# Patient Record
Sex: MALE | Race: BLACK OR AFRICAN AMERICAN | NOT HISPANIC OR LATINO | Employment: OTHER | URBAN - METROPOLITAN AREA
[De-identification: names, ages, dates, MRNs, and addresses within clinical notes are randomized per-mention and may not be internally consistent; named-entity substitution may affect disease eponyms.]

---

## 2019-04-22 ENCOUNTER — OFFICE VISIT (OUTPATIENT)
Dept: INTERNAL MEDICINE | Facility: CLINIC | Age: 44
End: 2019-04-22
Payer: OTHER GOVERNMENT

## 2019-04-22 VITALS
OXYGEN SATURATION: 98 % | BODY MASS INDEX: 34.64 KG/M2 | TEMPERATURE: 97 F | DIASTOLIC BLOOD PRESSURE: 94 MMHG | SYSTOLIC BLOOD PRESSURE: 138 MMHG | HEIGHT: 67 IN | WEIGHT: 220.69 LBS | HEART RATE: 73 BPM

## 2019-04-22 DIAGNOSIS — I10 ESSENTIAL HYPERTENSION: ICD-10-CM

## 2019-04-22 DIAGNOSIS — F43.10 PTSD (POST-TRAUMATIC STRESS DISORDER): ICD-10-CM

## 2019-04-22 DIAGNOSIS — N52.9 ERECTILE DYSFUNCTION, UNSPECIFIED ERECTILE DYSFUNCTION TYPE: ICD-10-CM

## 2019-04-22 DIAGNOSIS — B00.9 HSV-2 INFECTION: ICD-10-CM

## 2019-04-22 DIAGNOSIS — N40.0 BENIGN PROSTATIC HYPERPLASIA WITHOUT LOWER URINARY TRACT SYMPTOMS: ICD-10-CM

## 2019-04-22 DIAGNOSIS — G43.109 MIGRAINE WITH AURA AND WITHOUT STATUS MIGRAINOSUS, NOT INTRACTABLE: ICD-10-CM

## 2019-04-22 DIAGNOSIS — M10.9 GOUT, UNSPECIFIED CAUSE, UNSPECIFIED CHRONICITY, UNSPECIFIED SITE: ICD-10-CM

## 2019-04-22 DIAGNOSIS — Z00.00 ROUTINE GENERAL MEDICAL EXAMINATION AT A HEALTH CARE FACILITY: Primary | ICD-10-CM

## 2019-04-22 DIAGNOSIS — N18.9 CHRONIC KIDNEY DISEASE, UNSPECIFIED CKD STAGE: ICD-10-CM

## 2019-04-22 DIAGNOSIS — M17.0 ARTHRITIS OF BOTH KNEES: ICD-10-CM

## 2019-04-22 DIAGNOSIS — K58.0 IRRITABLE BOWEL SYNDROME WITH DIARRHEA: ICD-10-CM

## 2019-04-22 PROCEDURE — 99204 OFFICE O/P NEW MOD 45 MIN: CPT | Mod: PBBFAC,PN | Performed by: INTERNAL MEDICINE

## 2019-04-22 PROCEDURE — 99999 PR PBB SHADOW E&M-NEW PATIENT-LVL IV: ICD-10-PCS | Mod: PBBFAC,,, | Performed by: INTERNAL MEDICINE

## 2019-04-22 PROCEDURE — 99386 PR PREVENTIVE VISIT,NEW,40-64: ICD-10-PCS | Mod: S$PBB,,, | Performed by: INTERNAL MEDICINE

## 2019-04-22 PROCEDURE — 99999 PR PBB SHADOW E&M-NEW PATIENT-LVL IV: CPT | Mod: PBBFAC,,, | Performed by: INTERNAL MEDICINE

## 2019-04-22 PROCEDURE — 99386 PREV VISIT NEW AGE 40-64: CPT | Mod: S$PBB,,, | Performed by: INTERNAL MEDICINE

## 2019-04-22 RX ORDER — PRAZOSIN HYDROCHLORIDE 2 MG/1
1 CAPSULE ORAL NIGHTLY
COMMUNITY
End: 2019-04-22 | Stop reason: SDUPTHER

## 2019-04-22 RX ORDER — SILDENAFIL 100 MG/1
100 TABLET, FILM COATED ORAL DAILY PRN
COMMUNITY
End: 2019-04-22 | Stop reason: SDUPTHER

## 2019-04-22 RX ORDER — SILDENAFIL 100 MG/1
100 TABLET, FILM COATED ORAL DAILY PRN
Qty: 12 TABLET | Refills: 0 | Status: SHIPPED | OUTPATIENT
Start: 2019-04-22 | End: 2020-02-27 | Stop reason: SDUPTHER

## 2019-04-22 RX ORDER — PRAZOSIN HYDROCHLORIDE 2 MG/1
2 CAPSULE ORAL NIGHTLY
Qty: 90 CAPSULE | Refills: 0 | Status: SHIPPED | OUTPATIENT
Start: 2019-04-22 | End: 2019-05-28 | Stop reason: SDUPTHER

## 2019-04-22 RX ORDER — DICYCLOMINE HYDROCHLORIDE 10 MG/1
10 CAPSULE ORAL EVERY 6 HOURS PRN
Qty: 90 CAPSULE | Refills: 0 | Status: SHIPPED | OUTPATIENT
Start: 2019-04-22 | End: 2019-05-22

## 2019-04-22 RX ORDER — AMLODIPINE BESYLATE 5 MG/1
5 TABLET ORAL DAILY
COMMUNITY
End: 2019-04-22 | Stop reason: SDUPTHER

## 2019-04-22 RX ORDER — LOSARTAN POTASSIUM 25 MG/1
25 TABLET ORAL DAILY
Qty: 90 TABLET | Refills: 0 | Status: SHIPPED | OUTPATIENT
Start: 2019-04-22 | End: 2019-05-28 | Stop reason: SDUPTHER

## 2019-04-22 RX ORDER — VALACYCLOVIR HYDROCHLORIDE 500 MG/1
500 TABLET, FILM COATED ORAL 2 TIMES DAILY PRN
COMMUNITY
End: 2019-04-22 | Stop reason: SDUPTHER

## 2019-04-22 RX ORDER — VALACYCLOVIR HYDROCHLORIDE 500 MG/1
500 TABLET, FILM COATED ORAL 2 TIMES DAILY PRN
Qty: 30 TABLET | Refills: 0 | Status: SHIPPED | OUTPATIENT
Start: 2019-04-22 | End: 2019-05-28 | Stop reason: SDUPTHER

## 2019-04-22 RX ORDER — TRAZODONE HYDROCHLORIDE 50 MG/1
100 TABLET ORAL NIGHTLY PRN
COMMUNITY
End: 2019-04-22

## 2019-04-22 RX ORDER — LOSARTAN POTASSIUM 25 MG/1
25 TABLET ORAL DAILY
COMMUNITY
End: 2019-04-22 | Stop reason: SDUPTHER

## 2019-04-22 RX ORDER — RIZATRIPTAN BENZOATE 10 MG/1
10 TABLET ORAL
COMMUNITY
End: 2019-07-30

## 2019-04-22 RX ORDER — AMLODIPINE BESYLATE 5 MG/1
5 TABLET ORAL DAILY
Qty: 90 TABLET | Refills: 0 | Status: SHIPPED | OUTPATIENT
Start: 2019-04-22 | End: 2019-05-28 | Stop reason: SDUPTHER

## 2019-04-22 RX ORDER — PREDNISONE 10 MG/1
20 TABLET ORAL DAILY
COMMUNITY
End: 2019-11-18

## 2019-04-22 RX ORDER — SERTRALINE HYDROCHLORIDE 100 MG/1
100 TABLET, FILM COATED ORAL DAILY
COMMUNITY
End: 2019-05-28 | Stop reason: SDUPTHER

## 2019-04-22 RX ORDER — TRAZODONE HYDROCHLORIDE 150 MG/1
150 TABLET ORAL NIGHTLY
Qty: 90 TABLET | Refills: 0 | Status: SHIPPED | OUTPATIENT
Start: 2019-04-22 | End: 2019-05-28 | Stop reason: SDUPTHER

## 2019-04-22 RX ORDER — ALLOPURINOL 300 MG/1
300 TABLET ORAL DAILY
COMMUNITY
End: 2019-05-28 | Stop reason: SDUPTHER

## 2019-04-22 NOTE — PROGRESS NOTES
Subjective:      Patient ID: Elder Hernandez is a 44 y.o. male.    Chief Complaint: Establish Care    HPI   45 yo with   Patient Active Problem List   Diagnosis    PTSD (post-traumatic stress disorder)    Irritable bowel syndrome with diarrhea    Chronic kidney disease    Migraine with aura and without status migrainosus, not intractable    Erectile dysfunction    HSV-2 infection    HTN (hypertension)    Benign prostatic hyperplasia without lower urinary tract symptoms    Arthritis of both knees    Gout     Past Medical History:   Diagnosis Date    Acid reflux     Gout     Hypertension     Kidney problem     PTSD (post-traumatic stress disorder)     Sleep apnea      Here today for annual prev exam.  Compliant with meds without significant side effects. Energy and appetite are good.   Also here for management of mult med problems as outlined below. Dx with kidney disease in 2015.   No f/h of colon or prostate cancer.      Social History     Socioeconomic History    Marital status:      Spouse name: Not on file    Number of children: 4    Years of education: Not on file    Highest education level: Not on file   Occupational History    Occupation: [employer]   Social Needs    Financial resource strain: Not on file    Food insecurity:     Worry: Not on file     Inability: Not on file    Transportation needs:     Medical: Not on file     Non-medical: Not on file   Tobacco Use    Smoking status: Never Smoker   Substance and Sexual Activity    Alcohol use: Yes     Comment: socially    Drug use: Never    Sexual activity: Yes     Partners: Female     Birth control/protection: None   Lifestyle    Physical activity:     Days per week: Not on file     Minutes per session: Not on file    Stress: Rather much   Relationships    Social connections:     Talks on phone: Not on file     Gets together: Not on file     Attends Mu-ism service: Not on file     Active member of club or organization: Not  "on file     Attends meetings of clubs or organizations: Not on file     Relationship status: Not on file   Other Topics Concern    Not on file   Social History Narrative    Not on file          Review of Systems   Constitutional: Negative for chills and fever.   HENT: Negative for ear pain and sore throat.    Respiratory: Negative for cough, shortness of breath and wheezing.    Cardiovascular: Negative for chest pain, palpitations and leg swelling.   Gastrointestinal: Negative for abdominal pain and blood in stool.   Genitourinary: Negative for dysuria and hematuria.   Skin: Negative for rash and wound.   Neurological: Negative for seizures and syncope.   Psychiatric/Behavioral: Positive for sleep disturbance. Negative for dysphoric mood, hallucinations and suicidal ideas. The patient is not nervous/anxious.      Objective:   BP (!) 138/94   Pulse 73   Temp 96.8 °F (36 °C) (Tympanic)   Ht 5' 7" (1.702 m)   Wt 100.1 kg (220 lb 10.9 oz)   SpO2 98%   BMI 34.56 kg/m²     Physical Exam    Assessment:     1. Routine general medical examination at a health care facility    2. Chronic kidney disease, unspecified CKD stage    3. PTSD (post-traumatic stress disorder)    4. Irritable bowel syndrome with diarrhea    5. Migraine with aura and without status migrainosus, not intractable    6. Erectile dysfunction, unspecified erectile dysfunction type    7. HSV-2 infection    8. Essential hypertension    9. Gout, unspecified cause, unspecified chronicity, unspecified site    10. Benign prostatic hyperplasia without lower urinary tract symptoms    11. Arthritis of both knees      Plan:   Routine general medical examination at a health care facility  Heart healthy diet and reg exercise   reviewed  Reports up to date with vaccines via   -     Comprehensive metabolic panel; Future; Expected date: 04/22/2019  -     CBC auto differential; Future; Expected date: 04/22/2019  -     TSH; Future; Expected date: " 04/22/2019  -     Lipid panel; Future; Expected date: 04/22/2019  -     Hemoglobin A1c; Future; Expected date: 04/22/2019  -     PSA, Screening; Future; Expected date: 04/22/2019    Chronic kidney disease, unspecified CKD stage  -     Ambulatory Referral to Nephrology    PTSD (post-traumatic stress disorder)  -     traZODone (DESYREL) 150 MG tablet; Take 1 tablet (150 mg total) by mouth every evening.  Dispense: 90 tablet; Refill: 0    Irritable bowel syndrome with diarrhea  -     dicyclomine (BENTYL) 10 MG capsule; Take 1 capsule (10 mg total) by mouth every 6 (six) hours as needed.  Dispense: 90 capsule; Refill: 0    Migraine with aura and without status migrainosus, not intractable    Erectile dysfunction, unspecified erectile dysfunction type  -     sildenafil (VIAGRA) 100 MG tablet; Take 1 tablet (100 mg total) by mouth daily as needed for Erectile Dysfunction.  Dispense: 12 tablet; Refill: 0    HSV-2 infection  -     valACYclovir (VALTREX) 500 MG tablet; Take 1 tablet (500 mg total) by mouth 2 (two) times daily as needed.  Dispense: 30 tablet; Refill: 0    Essential hypertension  -     losartan (COZAAR) 25 MG tablet; Take 1 tablet (25 mg total) by mouth once daily.  Dispense: 90 tablet; Refill: 0  -     amLODIPine (NORVASC) 5 MG tablet; Take 1 tablet (5 mg total) by mouth once daily.  Dispense: 90 tablet; Refill: 0    Gout, unspecified cause, unspecified chronicity, unspecified site  -     Uric acid; Future; Expected date: 04/22/2019    Benign prostatic hyperplasia without lower urinary tract symptoms  -     prazosin (MINIPRESS) 2 MG Cap; Take 1 capsule (2 mg total) by mouth every evening.  Dispense: 90 capsule; Refill: 0    Arthritis of both knees  -     Ambulatory Referral to Orthopedics            Problem List Items Addressed This Visit        Neuro    Migraine with aura and without status migrainosus, not intractable    Current Assessment & Plan     Averages one ha per week              Psychiatric    PTSD  (post-traumatic stress disorder)    Overview     History of ambien 2 to 3 times per week, changed to trazodone 50 in march.          Current Assessment & Plan     zoloft controlling daytime symptoms/anxiety. Trazodone 100mg 3 to 4 nights. Felt fell asleep faster with ambien. 10 to15 min vs 30 to 60min with trazodone.          Relevant Medications    traZODone (DESYREL) 150 MG tablet       Cardiac/Vascular    HTN (hypertension)    Relevant Medications    losartan (COZAAR) 25 MG tablet    amLODIPine (NORVASC) 5 MG tablet       Renal/    Chronic kidney disease    Relevant Orders    Ambulatory Referral to Nephrology    Erectile dysfunction    Relevant Medications    sildenafil (VIAGRA) 100 MG tablet    Benign prostatic hyperplasia without lower urinary tract symptoms    Relevant Medications    prazosin (MINIPRESS) 2 MG Cap       ID    HSV-2 infection    Relevant Medications    valACYclovir (VALTREX) 500 MG tablet       GI    Irritable bowel syndrome with diarrhea    Relevant Medications    dicyclomine (BENTYL) 10 MG capsule       Orthopedic    Arthritis of both knees    Overview     Reports has failed mult treatments, received steroid injections q 6mo in [place].         Relevant Orders    Ambulatory Referral to Orthopedics    Gout    Relevant Orders    Uric acid      Other Visit Diagnoses     Routine general medical examination at a health care facility    -  Primary    Relevant Orders    Comprehensive metabolic panel    CBC auto differential    TSH    Lipid panel    Hemoglobin A1c    PSA, Screening          Follow up in about 1 month (around 5/20/2019), or if symptoms worsen or fail to improve.

## 2019-04-22 NOTE — ASSESSMENT & PLAN NOTE
zoloft controlling daytime symptoms/anxiety. Trazodone 100mg 3 to 4 nights. Felt fell asleep faster with ambien. 10 to15 min vs 30 to 60min with trazodone.

## 2019-04-24 ENCOUNTER — LAB VISIT (OUTPATIENT)
Dept: LAB | Facility: HOSPITAL | Age: 44
End: 2019-04-24
Attending: INTERNAL MEDICINE
Payer: OTHER GOVERNMENT

## 2019-04-24 DIAGNOSIS — M25.561 PAIN IN BOTH KNEES, UNSPECIFIED CHRONICITY: Primary | ICD-10-CM

## 2019-04-24 DIAGNOSIS — Z00.00 ROUTINE GENERAL MEDICAL EXAMINATION AT A HEALTH CARE FACILITY: ICD-10-CM

## 2019-04-24 DIAGNOSIS — M10.9 GOUT, UNSPECIFIED CAUSE, UNSPECIFIED CHRONICITY, UNSPECIFIED SITE: ICD-10-CM

## 2019-04-24 DIAGNOSIS — M25.562 PAIN IN BOTH KNEES, UNSPECIFIED CHRONICITY: Primary | ICD-10-CM

## 2019-04-24 LAB
ALBUMIN SERPL BCP-MCNC: 3.7 G/DL (ref 3.5–5.2)
ALP SERPL-CCNC: 80 U/L (ref 55–135)
ALT SERPL W/O P-5'-P-CCNC: 32 U/L (ref 10–44)
ANION GAP SERPL CALC-SCNC: 9 MMOL/L (ref 8–16)
AST SERPL-CCNC: 29 U/L (ref 10–40)
BASOPHILS # BLD AUTO: 0.04 K/UL (ref 0–0.2)
BASOPHILS NFR BLD: 0.8 % (ref 0–1.9)
BILIRUB SERPL-MCNC: 0.7 MG/DL (ref 0.1–1)
BUN SERPL-MCNC: 14 MG/DL (ref 6–20)
CALCIUM SERPL-MCNC: 9.6 MG/DL (ref 8.7–10.5)
CHLORIDE SERPL-SCNC: 105 MMOL/L (ref 95–110)
CHOLEST SERPL-MCNC: 181 MG/DL (ref 120–199)
CHOLEST/HDLC SERPL: 3 {RATIO} (ref 2–5)
CO2 SERPL-SCNC: 27 MMOL/L (ref 23–29)
COMPLEXED PSA SERPL-MCNC: 0.47 NG/ML (ref 0–4)
CREAT SERPL-MCNC: 1.6 MG/DL (ref 0.5–1.4)
DIFFERENTIAL METHOD: ABNORMAL
EOSINOPHIL # BLD AUTO: 0.1 K/UL (ref 0–0.5)
EOSINOPHIL NFR BLD: 1.4 % (ref 0–8)
ERYTHROCYTE [DISTWIDTH] IN BLOOD BY AUTOMATED COUNT: 13.2 % (ref 11.5–14.5)
EST. GFR  (AFRICAN AMERICAN): 58.9 ML/MIN/1.73 M^2
EST. GFR  (NON AFRICAN AMERICAN): 50.9 ML/MIN/1.73 M^2
ESTIMATED AVG GLUCOSE: 111 MG/DL (ref 68–131)
GLUCOSE SERPL-MCNC: 85 MG/DL (ref 70–110)
HBA1C MFR BLD HPLC: 5.5 % (ref 4–5.6)
HCT VFR BLD AUTO: 39.2 % (ref 40–54)
HDLC SERPL-MCNC: 60 MG/DL (ref 40–75)
HDLC SERPL: 33.1 % (ref 20–50)
HGB BLD-MCNC: 14.1 G/DL (ref 14–18)
IMM GRANULOCYTES # BLD AUTO: 0.01 K/UL (ref 0–0.04)
IMM GRANULOCYTES NFR BLD AUTO: 0.2 % (ref 0–0.5)
LDLC SERPL CALC-MCNC: 102.8 MG/DL (ref 63–159)
LYMPHOCYTES # BLD AUTO: 2.2 K/UL (ref 1–4.8)
LYMPHOCYTES NFR BLD: 43.7 % (ref 18–48)
MCH RBC QN AUTO: 31.3 PG (ref 27–31)
MCHC RBC AUTO-ENTMCNC: 36 G/DL (ref 32–36)
MCV RBC AUTO: 87 FL (ref 82–98)
MONOCYTES # BLD AUTO: 0.7 K/UL (ref 0.3–1)
MONOCYTES NFR BLD: 13.6 % (ref 4–15)
NEUTROPHILS # BLD AUTO: 2.1 K/UL (ref 1.8–7.7)
NEUTROPHILS NFR BLD: 40.3 % (ref 38–73)
NONHDLC SERPL-MCNC: 121 MG/DL
NRBC BLD-RTO: 0 /100 WBC
PLATELET # BLD AUTO: 254 K/UL (ref 150–350)
PMV BLD AUTO: 10.5 FL (ref 9.2–12.9)
POTASSIUM SERPL-SCNC: 4.7 MMOL/L (ref 3.5–5.1)
PROT SERPL-MCNC: 7.8 G/DL (ref 6–8.4)
RBC # BLD AUTO: 4.5 M/UL (ref 4.6–6.2)
SODIUM SERPL-SCNC: 141 MMOL/L (ref 136–145)
TRIGL SERPL-MCNC: 91 MG/DL (ref 30–150)
TSH SERPL DL<=0.005 MIU/L-ACNC: 2.24 UIU/ML (ref 0.4–4)
URATE SERPL-MCNC: 5.6 MG/DL (ref 3.4–7)
WBC # BLD AUTO: 5.13 K/UL (ref 3.9–12.7)

## 2019-04-24 PROCEDURE — 84550 ASSAY OF BLOOD/URIC ACID: CPT

## 2019-04-24 PROCEDURE — 83036 HEMOGLOBIN GLYCOSYLATED A1C: CPT

## 2019-04-24 PROCEDURE — 84153 ASSAY OF PSA TOTAL: CPT

## 2019-04-24 PROCEDURE — 80061 LIPID PANEL: CPT

## 2019-04-24 PROCEDURE — 36415 COLL VENOUS BLD VENIPUNCTURE: CPT

## 2019-04-24 PROCEDURE — 85025 COMPLETE CBC W/AUTO DIFF WBC: CPT

## 2019-04-24 PROCEDURE — 84443 ASSAY THYROID STIM HORMONE: CPT

## 2019-04-24 PROCEDURE — 80053 COMPREHEN METABOLIC PANEL: CPT

## 2019-05-03 ENCOUNTER — OFFICE VISIT (OUTPATIENT)
Dept: ORTHOPEDICS | Facility: CLINIC | Age: 44
End: 2019-05-03
Payer: OTHER GOVERNMENT

## 2019-05-03 ENCOUNTER — HOSPITAL ENCOUNTER (OUTPATIENT)
Dept: RADIOLOGY | Facility: HOSPITAL | Age: 44
Discharge: HOME | End: 2019-05-03
Attending: FAMILY MEDICINE
Payer: OTHER GOVERNMENT

## 2019-05-03 VITALS
HEIGHT: 67 IN | SYSTOLIC BLOOD PRESSURE: 126 MMHG | DIASTOLIC BLOOD PRESSURE: 85 MMHG | HEART RATE: 79 BPM | WEIGHT: 211 LBS | BODY MASS INDEX: 33.12 KG/M2

## 2019-05-03 DIAGNOSIS — M25.561 RIGHT KNEE PAIN, UNSPECIFIED CHRONICITY: Primary | ICD-10-CM

## 2019-05-03 DIAGNOSIS — M17.0 ARTHRITIS OF BOTH KNEES: Primary | ICD-10-CM

## 2019-05-03 DIAGNOSIS — M25.562 PAIN IN BOTH KNEES, UNSPECIFIED CHRONICITY: ICD-10-CM

## 2019-05-03 DIAGNOSIS — M79.671 BILATERAL FOOT PAIN: ICD-10-CM

## 2019-05-03 DIAGNOSIS — M79.672 BILATERAL FOOT PAIN: ICD-10-CM

## 2019-05-03 DIAGNOSIS — M25.561 PAIN IN BOTH KNEES, UNSPECIFIED CHRONICITY: ICD-10-CM

## 2019-05-03 PROCEDURE — 20610 LARGE JOINT ASPIRATION/INJECTION: R KNEE, L KNEE: ICD-10-PCS | Mod: 50,S$PBB,, | Performed by: FAMILY MEDICINE

## 2019-05-03 PROCEDURE — 99999 PR PBB SHADOW E&M-EST. PATIENT-LVL III: ICD-10-PCS | Mod: PBBFAC,,, | Performed by: FAMILY MEDICINE

## 2019-05-03 PROCEDURE — 20610 DRAIN/INJ JOINT/BURSA W/O US: CPT | Mod: 50,PBBFAC | Performed by: FAMILY MEDICINE

## 2019-05-03 PROCEDURE — 99213 PR OFFICE/OUTPT VISIT, EST, LEVL III, 20-29 MIN: ICD-10-PCS | Mod: S$PBB,25,, | Performed by: FAMILY MEDICINE

## 2019-05-03 PROCEDURE — 73564 XR KNEE ORTHO BILAT WITH FLEXION: ICD-10-PCS | Mod: 26,50,, | Performed by: RADIOLOGY

## 2019-05-03 PROCEDURE — 99213 OFFICE O/P EST LOW 20 MIN: CPT | Mod: PBBFAC,25 | Performed by: FAMILY MEDICINE

## 2019-05-03 PROCEDURE — 99213 OFFICE O/P EST LOW 20 MIN: CPT | Mod: S$PBB,25,, | Performed by: FAMILY MEDICINE

## 2019-05-03 PROCEDURE — 73564 X-RAY EXAM KNEE 4 OR MORE: CPT | Mod: 26,50,, | Performed by: RADIOLOGY

## 2019-05-03 PROCEDURE — 73564 X-RAY EXAM KNEE 4 OR MORE: CPT | Mod: TC,50

## 2019-05-03 PROCEDURE — 99999 PR PBB SHADOW E&M-EST. PATIENT-LVL III: CPT | Mod: PBBFAC,,, | Performed by: FAMILY MEDICINE

## 2019-05-03 RX ORDER — BETAMETHASONE SODIUM PHOSPHATE AND BETAMETHASONE ACETATE 3; 3 MG/ML; MG/ML
6 INJECTION, SUSPENSION INTRA-ARTICULAR; INTRALESIONAL; INTRAMUSCULAR; SOFT TISSUE
Status: DISCONTINUED | OUTPATIENT
Start: 2019-05-03 | End: 2019-05-03 | Stop reason: HOSPADM

## 2019-05-03 RX ADMIN — BETAMETHASONE ACETATE AND BETAMETHASONE SODIUM PHOSPHATE 6 MG: 3; 3 INJECTION, SUSPENSION INTRA-ARTICULAR; INTRALESIONAL; INTRAMUSCULAR; SOFT TISSUE at 05:05

## 2019-05-03 NOTE — PROGRESS NOTES
Subjective:     Patient ID: Elder Hernandez is a 44 y.o. male.    Chief Complaint: Pain and Swelling of the Left Knee and Pain and Swelling of the Right Knee      HPI:  The patient is in the  and states that he has been in the habit of getting a cortisone injection to each knee approximately every 6 months over the past few years because of chronic degenerative and arthritic changes to his knees.  He does not know of any recent acute change regarding his knees or more recent injury which has occurred since his last injection.  He will have his records sent to us so that we can compare his x-rays for continuity.  Patient states he was able to get through a physical training exercises morning requiring some running but that he had some knee pain doing so but no swelling that he can tell.    He states that he also sees someone to evaluate his foot problems once or twice a year and has to have custom orthotics done so we will make a referral to Podiatry for him.    Past Medical History:   Diagnosis Date    Acid reflux     Gout     Hypertension     Kidney problem     PTSD (post-traumatic stress disorder)     Sleep apnea      Past Surgical History:   Procedure Laterality Date    RENAL BIOPSY Right 2015    SHOULDER SURGERY Left 2015     No family history on file.  Social History     Socioeconomic History    Marital status:      Spouse name: Not on file    Number of children: 4    Years of education: Not on file    Highest education level: Not on file   Occupational History    Occupation: [employer]   Social Needs    Financial resource strain: Not on file    Food insecurity:     Worry: Not on file     Inability: Not on file    Transportation needs:     Medical: Not on file     Non-medical: Not on file   Tobacco Use    Smoking status: Never Smoker   Substance and Sexual Activity    Alcohol use: Yes     Comment: socially    Drug use: Never    Sexual activity: Yes     Partners: Female     Birth  control/protection: None   Lifestyle    Physical activity:     Days per week: Not on file     Minutes per session: Not on file    Stress: Rather much   Relationships    Social connections:     Talks on phone: Not on file     Gets together: Not on file     Attends Sikhism service: Not on file     Active member of club or organization: Not on file     Attends meetings of clubs or organizations: Not on file     Relationship status: Not on file   Other Topics Concern    Not on file   Social History Narrative    Not on file       Current Outpatient Medications:     allopurinol (ZYLOPRIM) 300 MG tablet, Take 300 mg by mouth once daily., Disp: , Rfl:     amLODIPine (NORVASC) 5 MG tablet, Take 1 tablet (5 mg total) by mouth once daily., Disp: 90 tablet, Rfl: 0    dicyclomine (BENTYL) 10 MG capsule, Take 1 capsule (10 mg total) by mouth every 6 (six) hours as needed., Disp: 90 capsule, Rfl: 0    losartan (COZAAR) 25 MG tablet, Take 1 tablet (25 mg total) by mouth once daily., Disp: 90 tablet, Rfl: 0    MULTIVITAMIN ORAL, Take 1 tablet by mouth once daily., Disp: , Rfl:     prazosin (MINIPRESS) 2 MG Cap, Take 1 capsule (2 mg total) by mouth every evening., Disp: 90 capsule, Rfl: 0    predniSONE (DELTASONE) 10 MG tablet, Take 20 mg by mouth once daily., Disp: , Rfl:     rizatriptan (MAXALT) 10 MG tablet, Take 10 mg by mouth as needed for Migraine., Disp: , Rfl:     sertraline (ZOLOFT) 100 MG tablet, Take 100 mg by mouth once daily., Disp: , Rfl:     sildenafil (VIAGRA) 100 MG tablet, Take 1 tablet (100 mg total) by mouth daily as needed for Erectile Dysfunction., Disp: 12 tablet, Rfl: 0    traZODone (DESYREL) 150 MG tablet, Take 1 tablet (150 mg total) by mouth every evening., Disp: 90 tablet, Rfl: 0    valACYclovir (VALTREX) 500 MG tablet, Take 1 tablet (500 mg total) by mouth 2 (two) times daily as needed., Disp: 30 tablet, Rfl: 0  Review of patient's allergies indicates:  No Known Allergies  Review of  Systems   Constitutional: Negative for chills, fever and weight loss.   Respiratory: Negative for shortness of breath.    Cardiovascular: Negative for chest pain and palpitations.       Objective:   Body mass index is 33.05 kg/m².  Vitals:    05/03/19 1318   BP: 126/85   Pulse: 79           Ortho/SPM Exam alert and oriented, well-nourished well-developed adult male in  uniform appears to be physically fit gentleman.  Ambulatory with normal gait.    Bilateral knee exam-knees are nontender to palpation and without effusion.  No deformity or discoloration  Neurovascular intact  Full range of motion 0-120 degrees bilaterally.  No atrophy and strength is good 5/5 bilaterally.  Negative Lachman's joints are stable no excessive lateral laxity.    IMAGING     Radiographs / Imaging : Relevant imaging results reviewed by me and interpreted by me, discussed with the patient and / or family       Assessment:     Encounter Diagnoses   Name Primary?    Arthritis of both knees     Bilateral foot pain Yes        Plan:   Bilateral foot pain  -     Ambulatory referral to Podiatry    Arthritis of both knees          Torito Damon M.D.  [facility]

## 2019-05-03 NOTE — PROCEDURES
Large Joint Aspiration/Injection: R knee, L knee  Date/Time: 5/3/2019 5:55 PM  Performed by: Torito Damon MD  Authorized by: Torito Damon MD     Consent Done?:  Yes (Verbal)  Indications:  Pain  Procedure site marked: Yes    Timeout: Prior to procedure the correct patient, procedure, and site was verified      Location:  Knee  Site:  R knee and L knee  Prep: Patient was prepped and draped in usual sterile fashion    Medications:  6 mg betamethasone acetate-betamethasone sodium phosphate 6 mg/mL  Patient tolerance:  Patient tolerated the procedure well with no immediate complications    Additional Comments: Both knees were injected from the superior lateral aspect without difficulty and with no complications.  The patient knows to apply ice periodically.  X-ray results were reviewed which revealed some bone spurring and degenerative changes and there was an abnormality noted the inferior aspect of the left patella but this did not correspond clinically to any point tenderness and patient denies any recent injury.

## 2019-05-03 NOTE — LETTER
May 3, 2019      Tommie Baltazar MD  [address] [place] [address]           [facility]  [address] [facility] [address]  Phone: [phone]  Fax: [phone]          Patient: Elder Hernandez   MR Number: [id number]   YOB: 1975   Date of Visit: 5/3/2019       Dear Dr. Tommie Baltazar:    Thank you for referring Elder Hernandez to me for evaluation. Attached you will find relevant portions of my assessment and plan of care.    If you have questions, please do not hesitate to call me. I look forward to following Elder Hernandez along with you.    Sincerely,    Torito Damon MD    Enclosure  CC:  No Recipients    If you would like to receive this communication electronically, please contact [email or web address] or [phone] to request more information on [employer] Link access.    For providers and/or their staff who would like to refer a patient to [facility], please contact us through our one-stop-shop provider referral line, [facility], at [phone].    If you feel you have received this communication in error or would no longer like to receive these types of communications, please e-mail [email or web address]

## 2019-05-06 ENCOUNTER — LAB VISIT (OUTPATIENT)
Dept: LAB | Facility: HOSPITAL | Age: 44
End: 2019-05-06
Attending: INTERNAL MEDICINE
Payer: OTHER GOVERNMENT

## 2019-05-06 ENCOUNTER — OFFICE VISIT (OUTPATIENT)
Dept: NEPHROLOGY | Facility: CLINIC | Age: 44
End: 2019-05-06
Payer: OTHER GOVERNMENT

## 2019-05-06 VITALS
HEIGHT: 67 IN | WEIGHT: 219.13 LBS | BODY MASS INDEX: 34.39 KG/M2 | HEART RATE: 66 BPM | SYSTOLIC BLOOD PRESSURE: 110 MMHG | DIASTOLIC BLOOD PRESSURE: 78 MMHG

## 2019-05-06 DIAGNOSIS — T79.6XXS TRAUMATIC RHABDOMYOLYSIS, SEQUELA: ICD-10-CM

## 2019-05-06 DIAGNOSIS — R80.9 PROTEINURIA, UNSPECIFIED TYPE: Primary | ICD-10-CM

## 2019-05-06 DIAGNOSIS — Z71.89 ENCOUNTER FOR MEDICATION REVIEW AND COUNSELING: ICD-10-CM

## 2019-05-06 DIAGNOSIS — R80.9 PROTEINURIA, UNSPECIFIED TYPE: ICD-10-CM

## 2019-05-06 DIAGNOSIS — N18.30 CHRONIC KIDNEY DISEASE, STAGE III (MODERATE): ICD-10-CM

## 2019-05-06 DIAGNOSIS — I10 ESSENTIAL HYPERTENSION: ICD-10-CM

## 2019-05-06 LAB
BACTERIA #/AREA URNS HPF: NORMAL /HPF
BILIRUB UR QL STRIP: NEGATIVE
CLARITY UR: CLEAR
COLOR UR: YELLOW
CREAT UR-MCNC: 176 MG/DL (ref 23–375)
GLUCOSE UR QL STRIP: NEGATIVE
HGB UR QL STRIP: ABNORMAL
HYALINE CASTS #/AREA URNS LPF: 0 /LPF
KETONES UR QL STRIP: NEGATIVE
LEUKOCYTE ESTERASE UR QL STRIP: NEGATIVE
MICROSCOPIC COMMENT: NORMAL
NITRITE UR QL STRIP: NEGATIVE
PH UR STRIP: 6 [PH] (ref 5–8)
PROT UR QL STRIP: ABNORMAL
PROT UR-MCNC: 160 MG/DL (ref 0–15)
PROT/CREAT UR: 0.91 MG/G{CREAT} (ref 0–0.2)
RBC #/AREA URNS HPF: 1 /HPF (ref 0–4)
SP GR UR STRIP: >=1.03 (ref 1–1.03)
URN SPEC COLLECT METH UR: ABNORMAL
WBC #/AREA URNS HPF: 1 /HPF (ref 0–5)

## 2019-05-06 PROCEDURE — 99205 PR OFFICE/OUTPT VISIT, NEW, LEVL V, 60-74 MIN: ICD-10-PCS | Mod: S$PBB,,, | Performed by: INTERNAL MEDICINE

## 2019-05-06 PROCEDURE — 99205 OFFICE O/P NEW HI 60 MIN: CPT | Mod: S$PBB,,, | Performed by: INTERNAL MEDICINE

## 2019-05-06 PROCEDURE — 99999 PR PBB SHADOW E&M-EST. PATIENT-LVL III: CPT | Mod: PBBFAC,,, | Performed by: INTERNAL MEDICINE

## 2019-05-06 PROCEDURE — 81000 URINALYSIS NONAUTO W/SCOPE: CPT

## 2019-05-06 PROCEDURE — 99999 PR PBB SHADOW E&M-EST. PATIENT-LVL III: ICD-10-PCS | Mod: PBBFAC,,, | Performed by: INTERNAL MEDICINE

## 2019-05-06 PROCEDURE — 99213 OFFICE O/P EST LOW 20 MIN: CPT | Mod: PBBFAC | Performed by: INTERNAL MEDICINE

## 2019-05-06 PROCEDURE — 84156 ASSAY OF PROTEIN URINE: CPT

## 2019-05-06 NOTE — PROGRESS NOTES
"Elder Hernandez is a 44 y.o. male for whom nephrology consult has been requested to evaluate and give opinion.   Referring physician: Dr. Baltazar  Reason for consult: CKD and proteinuria    HPI: pt was seen and examined. Chart and available records reviewed. Pt is a 43 y/o male who was referred to us for above reasons. Pt previously lived in [place] and saw a nephrology group there. He informed me that he was found to have proteinuria, can not quantify how much, and had a kidney biopsy done in the past (pt does not know the diagnosis). Pt unable to remember what his s Cr was in the past. He also informed me that he was found to have "rhabdomyolysis" in 2014 after heavy exercise in the [employer]. Currently pt feels comfortable, no acute or new issues, no CP, no SOB, no blood in urine, urine is not dark. Denies using NSAIds, reports that he has nearly daily diarrhea from irritable bowel syndrome, no recent infection, no ab, no sx's of dysuria.    PAST MEDICAL HISTORY:  CKD stage 3, rhabdomyolysis, Acid reflux, irritable bowel syndrome, Gout, Hypertension, PTSD (post-traumatic stress disorder), and Sleep apnea.    PAST SURGICAL HISTORY:  He  has a past surgical history that includes Shoulder surgery (Left, 2015) and Renal biopsy (Right, 2015).    SOCIAL HISTORY:  He  reports that he has never smoked. He does not have any smokeless tobacco history on file. He reports that he drinks alcohol. He reports that he does not use drugs.    FAMILY MEDICAL HISTORY:  His family history is not on file.    Review of patient's allergies indicates:  No Known Allergies        Prior to Admission medications    Medication Sig Start Date End Date Taking? Authorizing Provider   allopurinol (ZYLOPRIM) 300 MG tablet Take 300 mg by mouth once daily.   Yes Historical Provider, MD   amLODIPine (NORVASC) 5 MG tablet Take 1 tablet (5 mg total) by mouth once daily. 4/22/19  Yes Tommie Baltazar MD   dicyclomine (BENTYL) 10 MG capsule Take 1 " "capsule (10 mg total) by mouth every 6 (six) hours as needed. 4/22/19 5/22/19 Yes Tommie Baltazar MD   losartan (COZAAR) 25 MG tablet Take 1 tablet (25 mg total) by mouth once daily. 4/22/19  Yes Tommie Baltazar MD   MULTIVITAMIN ORAL Take 1 tablet by mouth once daily.   Yes Historical Provider, MD   prazosin (MINIPRESS) 2 MG Cap Take 1 capsule (2 mg total) by mouth every evening. 4/22/19  Yes Tmomie Baltazar MD   predniSONE (DELTASONE) 10 MG tablet Take 20 mg by mouth once daily.   Yes Historical Provider, MD   rizatriptan (MAXALT) 10 MG tablet Take 10 mg by mouth as needed for Migraine.   Yes Historical Provider, MD   sertraline (ZOLOFT) 100 MG tablet Take 100 mg by mouth once daily.   Yes Historical Provider, MD   sildenafil (VIAGRA) 100 MG tablet Take 1 tablet (100 mg total) by mouth daily as needed for Erectile Dysfunction. 4/22/19  Yes Tommie Baltazar MD   traZODone (DESYREL) 150 MG tablet Take 1 tablet (150 mg total) by mouth every evening. 4/22/19 4/21/20 Yes Tommie Baltazar MD   valACYclovir (VALTREX) 500 MG tablet Take 1 tablet (500 mg total) by mouth 2 (two) times daily as needed. 4/22/19  Yes Tommie Baltazar MD        REVIEW OF SYSTEMS:  Patient has no fever, fatigue, visual changes, chest pain, edema, cough, dyspnea, nausea, vomiting, constipation, diarrhea, arthralgias, pruritis, dizziness, weakness, depression, confusion.    [unfilled]    PHYSICAL EXAM:   height is 5' 7" (1.702 m) and weight is 99.4 kg (219 lb 2.2 oz). His blood pressure is 110/78 and his pulse is 66.   Gen: WDWN male in no apparent distress  Psych: Normal mood and affect  Skin: No rashes or ulcers  Eyes: Normal conjunctiva and lids, PERRLA  ENT: Normal hearing with no oropharyngeal lesions  Neck: No JVD  Chest: Clear with no rales, rhonchi, wheezing with normal effort  CV: Regular with no murmurs, gallops or rubs  Abd: Soft, nontender, no distension, positive bowel sounds  Ext: No cyanosis, clubbing or edema    Labs: " reviewed  BMP  Lab Results   Component Value Date     04/24/2019    K 4.7 04/24/2019     04/24/2019    CO2 27 04/24/2019    BUN 14 04/24/2019    CREATININE 1.6 (H) 04/24/2019    CALCIUM 9.6 04/24/2019    ANIONGAP 9 04/24/2019    ESTGFRAFRICA 58.9 (A) 04/24/2019    EGFRNONAA 50.9 (A) 04/24/2019     Lab Results   Component Value Date    WBC 5.13 04/24/2019    HGB 14.1 04/24/2019    HCT 39.2 (L) 04/24/2019    MCV 87 04/24/2019     04/24/2019     Uric acid 5.6  PSA 0.4  U/a: 2+ protein, trace blood, no RBC's, no casts      IMPRESSION AND RECOMMENDATIONS:  45 y/o male with abnormal s Cr and proteinuria, and past h/o of rhabdomyolysis  The impression is:    1. Renal: pt has s Cr of 1.6, c/w mild renal insufficiency.  No prior labs available to compare. Labs being repeated today  On the other hand, pt is quite muscular, therefore, s Cr of 1.6 is not very high for him    Proteinuria is concerning  Needs to be quantified  Noted h/o of Herpes  Denies social risk factors for hep C, hep B, HIV  Likely will benefit from a kidney biopsy, if the result of the kidney biopsy he did in [place] can not be found.  Will request the records    HTN: BP controlled  And u/a is o/w quite bland    2. Past h/o of rhabdomyolysis: likely unrelated  Due to strenuous physical activity    Plans and recommendations:  As discussed above  Opportunity for questions provided.  Total time spent 60 minutes including time needed to review the records, the   patient evaluation, documentation, face-to-face discussion with the patient,   more than 50% of the time was spent on coordination of care and counseling.    Level V visit.    Eliel Bello

## 2019-05-06 NOTE — LETTER
May 6, 2019      Tommie Baltazar MD  [address] [place] [address]           [facility]  [address] [facility] [address]  Phone: [phone]  Fax: [phone]          Patient: Elder Hernandez   MR Number: [id number]   YOB: 1975   Date of Visit: 5/6/2019       Dear Dr. Tommie Baltazar:    Thank you for referring Elder Hernandez to me for evaluation. Attached you will find relevant portions of my assessment and plan of care.    If you have questions, please do not hesitate to call me. I look forward to following Elder Hernandez along with you.    Sincerely,    Eliel Bello MD    Enclosure  CC:  No Recipients    If you would like to receive this communication electronically, please contact [email or web address] or [phone] to request more information on [employer] Link access.    For providers and/or their staff who would like to refer a patient to [facility], please contact us through our one-stop-shop provider referral line, [facility], at [phone].    If you feel you have received this communication in error or would no longer like to receive these types of communications, please e-mail [email or web address]

## 2019-05-27 ENCOUNTER — OFFICE VISIT (OUTPATIENT)
Dept: PODIATRY | Facility: CLINIC | Age: 44
End: 2019-05-27
Payer: OTHER GOVERNMENT

## 2019-05-27 VITALS
BODY MASS INDEX: 33.35 KG/M2 | HEIGHT: 67 IN | WEIGHT: 212.5 LBS | SYSTOLIC BLOOD PRESSURE: 124 MMHG | HEART RATE: 69 BPM | DIASTOLIC BLOOD PRESSURE: 80 MMHG

## 2019-05-27 DIAGNOSIS — M76.821 POSTERIOR TIBIAL TENDON DYSFUNCTION (PTTD) OF BOTH LOWER EXTREMITIES: Primary | ICD-10-CM

## 2019-05-27 DIAGNOSIS — M21.42 PES PLANUS OF BOTH FEET: ICD-10-CM

## 2019-05-27 DIAGNOSIS — M76.822 POSTERIOR TIBIAL TENDON DYSFUNCTION (PTTD) OF BOTH LOWER EXTREMITIES: Primary | ICD-10-CM

## 2019-05-27 DIAGNOSIS — M24.573 EQUINUS CONTRACTURE OF ANKLE: ICD-10-CM

## 2019-05-27 DIAGNOSIS — M21.41 PES PLANUS OF BOTH FEET: ICD-10-CM

## 2019-05-27 PROCEDURE — 99204 OFFICE O/P NEW MOD 45 MIN: CPT | Mod: S$PBB,,, | Performed by: PODIATRIST

## 2019-05-27 PROCEDURE — 99999 PR PBB SHADOW E&M-EST. PATIENT-LVL III: ICD-10-PCS | Mod: PBBFAC,,, | Performed by: PODIATRIST

## 2019-05-27 PROCEDURE — 99999 PR PBB SHADOW E&M-EST. PATIENT-LVL III: CPT | Mod: PBBFAC,,, | Performed by: PODIATRIST

## 2019-05-27 PROCEDURE — 99213 OFFICE O/P EST LOW 20 MIN: CPT | Mod: PBBFAC | Performed by: PODIATRIST

## 2019-05-27 PROCEDURE — 99204 PR OFFICE/OUTPT VISIT, NEW, LEVL IV, 45-59 MIN: ICD-10-PCS | Mod: S$PBB,,, | Performed by: PODIATRIST

## 2019-05-27 NOTE — LETTER
May 28, 2019      Torito Damon MD  [address]  [place] [address]           [facility]  [address] [place] [address]  Phone: [phone]  Fax: [phone]          Patient: Elder Hernandez   MR Number: [id number]   YOB: 1975   Date of Visit: 5/27/2019       Dear Dr. Torito Damon:    Thank you for referring Elder Hernandez to me for evaluation. Attached you will find relevant portions of my assessment and plan of care.    If you have questions, please do not hesitate to call me. I look forward to following Elder Hernandez along with you.    Sincerely,    Pradeep Da Silva DPM    Enclosure  CC:  No Recipients    If you would like to receive this communication electronically, please contact [email or web address] or [phone] to request more information on [employer] Link access.    For providers and/or their staff who would like to refer a patient to [facility], please contact us through our one-stop-shop provider referral line, [facility], at [phone].    If you feel you have received this communication in error or would no longer like to receive these types of communications, please e-mail [email or web address]

## 2019-05-27 NOTE — PATIENT INSTRUCTIONS
[facility]  [address]  [place] [address]   Phone: [phone]  Fax: [phone] [address] [place] [address] [phone]     [facility]  [address]  [place] [address]  Phone: [phone]

## 2019-05-28 ENCOUNTER — CLINICAL SUPPORT (OUTPATIENT)
Dept: CARDIOLOGY | Facility: CLINIC | Age: 44
End: 2019-05-28
Payer: OTHER GOVERNMENT

## 2019-05-28 ENCOUNTER — LAB VISIT (OUTPATIENT)
Dept: LAB | Facility: HOSPITAL | Age: 44
End: 2019-05-28
Attending: INTERNAL MEDICINE
Payer: OTHER GOVERNMENT

## 2019-05-28 ENCOUNTER — OFFICE VISIT (OUTPATIENT)
Dept: INTERNAL MEDICINE | Facility: CLINIC | Age: 44
End: 2019-05-28
Payer: OTHER GOVERNMENT

## 2019-05-28 VITALS
BODY MASS INDEX: 34.22 KG/M2 | DIASTOLIC BLOOD PRESSURE: 86 MMHG | WEIGHT: 218.5 LBS | SYSTOLIC BLOOD PRESSURE: 110 MMHG | TEMPERATURE: 96 F

## 2019-05-28 DIAGNOSIS — Z00.00 PREVENTATIVE HEALTH CARE: ICD-10-CM

## 2019-05-28 DIAGNOSIS — B00.9 HSV-2 INFECTION: ICD-10-CM

## 2019-05-28 DIAGNOSIS — N40.0 BENIGN PROSTATIC HYPERPLASIA WITHOUT LOWER URINARY TRACT SYMPTOMS: ICD-10-CM

## 2019-05-28 DIAGNOSIS — I10 ESSENTIAL HYPERTENSION: Primary | ICD-10-CM

## 2019-05-28 DIAGNOSIS — F43.10 PTSD (POST-TRAUMATIC STRESS DISORDER): ICD-10-CM

## 2019-05-28 DIAGNOSIS — M10.9 GOUT, UNSPECIFIED CAUSE, UNSPECIFIED CHRONICITY, UNSPECIFIED SITE: ICD-10-CM

## 2019-05-28 DIAGNOSIS — R74.8 ELEVATED CK: ICD-10-CM

## 2019-05-28 DIAGNOSIS — Z91.89 AT RISK FOR CORONARY ARTERY DISEASE: ICD-10-CM

## 2019-05-28 LAB
CRP SERPL-MCNC: 3 MG/L (ref 0–8.2)
ERYTHROCYTE [SEDIMENTATION RATE] IN BLOOD BY WESTERGREN METHOD: <2 MM/HR (ref 0–23)

## 2019-05-28 PROCEDURE — 93010 ELECTROCARDIOGRAM REPORT: CPT | Mod: S$PBB,,, | Performed by: INTERNAL MEDICINE

## 2019-05-28 PROCEDURE — 99213 OFFICE O/P EST LOW 20 MIN: CPT | Mod: PBBFAC,25,PN | Performed by: INTERNAL MEDICINE

## 2019-05-28 PROCEDURE — 82085 ASSAY OF ALDOLASE: CPT

## 2019-05-28 PROCEDURE — 86140 C-REACTIVE PROTEIN: CPT

## 2019-05-28 PROCEDURE — 99999 PR PBB SHADOW E&M-EST. PATIENT-LVL III: ICD-10-PCS | Mod: PBBFAC,,, | Performed by: INTERNAL MEDICINE

## 2019-05-28 PROCEDURE — 99999 PR PBB SHADOW E&M-EST. PATIENT-LVL III: CPT | Mod: PBBFAC,,, | Performed by: INTERNAL MEDICINE

## 2019-05-28 PROCEDURE — 99214 PR OFFICE/OUTPT VISIT, EST, LEVL IV, 30-39 MIN: ICD-10-PCS | Mod: S$PBB,,, | Performed by: INTERNAL MEDICINE

## 2019-05-28 PROCEDURE — 85652 RBC SED RATE AUTOMATED: CPT

## 2019-05-28 PROCEDURE — 93005 ELECTROCARDIOGRAM TRACING: CPT | Mod: PBBFAC | Performed by: INTERNAL MEDICINE

## 2019-05-28 PROCEDURE — 36415 COLL VENOUS BLD VENIPUNCTURE: CPT

## 2019-05-28 PROCEDURE — 93010 EKG 12-LEAD: ICD-10-PCS | Mod: S$PBB,,, | Performed by: INTERNAL MEDICINE

## 2019-05-28 PROCEDURE — 86703 HIV-1/HIV-2 1 RESULT ANTBDY: CPT

## 2019-05-28 PROCEDURE — 99214 OFFICE O/P EST MOD 30 MIN: CPT | Mod: S$PBB,,, | Performed by: INTERNAL MEDICINE

## 2019-05-28 RX ORDER — AMLODIPINE BESYLATE 5 MG/1
5 TABLET ORAL DAILY
Qty: 90 TABLET | Refills: 3 | Status: SHIPPED | OUTPATIENT
Start: 2019-05-28 | End: 2019-05-29

## 2019-05-28 RX ORDER — SERTRALINE HYDROCHLORIDE 100 MG/1
100 TABLET, FILM COATED ORAL DAILY
Qty: 90 TABLET | Refills: 3 | Status: SHIPPED | OUTPATIENT
Start: 2019-05-28 | End: 2020-03-06 | Stop reason: SDUPTHER

## 2019-05-28 RX ORDER — VALACYCLOVIR HYDROCHLORIDE 500 MG/1
500 TABLET, FILM COATED ORAL 2 TIMES DAILY PRN
Qty: 30 TABLET | Refills: 0 | Status: SHIPPED | OUTPATIENT
Start: 2019-05-28 | End: 2019-10-18 | Stop reason: SDUPTHER

## 2019-05-28 RX ORDER — PRAZOSIN HYDROCHLORIDE 2 MG/1
2 CAPSULE ORAL NIGHTLY
Qty: 90 CAPSULE | Refills: 3 | Status: SHIPPED | OUTPATIENT
Start: 2019-05-28 | End: 2020-03-06 | Stop reason: SDUPTHER

## 2019-05-28 RX ORDER — ALLOPURINOL 300 MG/1
TABLET ORAL
Qty: 45 TABLET | Refills: 1 | Status: SHIPPED | OUTPATIENT
Start: 2019-05-28 | End: 2020-02-05

## 2019-05-28 RX ORDER — LOSARTAN POTASSIUM 25 MG/1
25 TABLET ORAL DAILY
Qty: 90 TABLET | Refills: 3 | Status: SHIPPED | OUTPATIENT
Start: 2019-05-28 | End: 2020-03-06

## 2019-05-28 RX ORDER — TRAZODONE HYDROCHLORIDE 150 MG/1
150 TABLET ORAL NIGHTLY
Qty: 90 TABLET | Refills: 2 | Status: SHIPPED | OUTPATIENT
Start: 2019-05-28 | End: 2020-03-06 | Stop reason: SDUPTHER

## 2019-05-28 NOTE — PROGRESS NOTES
Subjective:     Patient ID: Elder Hernandez is a 44 y.o. male.    Chief Complaint: Arthritis (Pt states that he would like to see about custom orthotics for arch support and arthritis. Pt states no current pain )    HPI: This 44 year old male presents today complaining of painful bilateral foot. The patient states he has had pain in bilateral foot for several years. Patient does not relate a history of trauma. When asked to indicate where the pain is located, patient points to medial foot. Patient states his custom inserts have helped, and he is in need of a new prescription for the inserts. Patient denies other complaints at this time.       Patient Active Problem List   Diagnosis    PTSD (post-traumatic stress disorder)    Irritable bowel syndrome with diarrhea    Chronic kidney disease    Migraine with aura and without status migrainosus, not intractable    Erectile dysfunction    HSV-2 infection    HTN (hypertension)    Benign prostatic hyperplasia without lower urinary tract symptoms    Arthritis of both knees    Gout    Bilateral foot pain       Medication List with Changes/Refills   Current Medications    LINACLOTIDE (LINZESS ORAL)    Take 1 tablet by mouth once daily.    MULTIVITAMIN ORAL    Take 1 tablet by mouth once daily.    PREDNISONE (DELTASONE) 10 MG TABLET    Take 20 mg by mouth once daily.    RIZATRIPTAN (MAXALT) 10 MG TABLET    Take 10 mg by mouth as needed for Migraine.    SILDENAFIL (VIAGRA) 100 MG TABLET    Take 1 tablet (100 mg total) by mouth daily as needed for Erectile Dysfunction.   Changed and/or Refilled Medications    Modified Medication Previous Medication    ALLOPURINOL (ZYLOPRIM) 300 MG TABLET allopurinol (ZYLOPRIM) 300 MG tablet       take 1/2 tablet by mouth daily.    Take 300 mg by mouth once daily.    AMLODIPINE (NORVASC) 5 MG TABLET amLODIPine (NORVASC) 5 MG tablet       Take 1 tablet (5 mg total) by mouth once daily.    Take 1 tablet (5 mg total) by mouth once daily.     LOSARTAN (COZAAR) 25 MG TABLET losartan (COZAAR) 25 MG tablet       Take 1 tablet (25 mg total) by mouth once daily.    Take 1 tablet (25 mg total) by mouth once daily.    PRAZOSIN (MINIPRESS) 2 MG CAP prazosin (MINIPRESS) 2 MG Cap       Take 1 capsule (2 mg total) by mouth every evening.    Take 1 capsule (2 mg total) by mouth every evening.    SERTRALINE (ZOLOFT) 100 MG TABLET sertraline (ZOLOFT) 100 MG tablet       Take 1 tablet (100 mg total) by mouth once daily.    Take 100 mg by mouth once daily.    TRAZODONE (DESYREL) 150 MG TABLET traZODone (DESYREL) 150 MG tablet       Take 1 tablet (150 mg total) by mouth every evening.    Take 1 tablet (150 mg total) by mouth every evening.    VALACYCLOVIR (VALTREX) 500 MG TABLET valACYclovir (VALTREX) 500 MG tablet       Take 1 tablet (500 mg total) by mouth 2 (two) times daily as needed.    Take 1 tablet (500 mg total) by mouth 2 (two) times daily as needed.       Review of patient's allergies indicates:  No Known Allergies    Past Surgical History:   Procedure Laterality Date    RENAL BIOPSY Right 2015    SHOULDER SURGERY Left 2015       History reviewed. No pertinent family history.    Social History     Socioeconomic History    Marital status:      Spouse name: Not on file    Number of children: 4    Years of education: Not on file    Highest education level: Not on file   Occupational History    Occupation: [employer]   Social Needs    Financial resource strain: Not on file    Food insecurity:     Worry: Not on file     Inability: Not on file    Transportation needs:     Medical: Not on file     Non-medical: Not on file   Tobacco Use    Smoking status: Never Smoker   Substance and Sexual Activity    Alcohol use: Yes     Comment: socially    Drug use: Never    Sexual activity: Yes     Partners: Female     Birth control/protection: None   Lifestyle    Physical activity:     Days per week: Not on file     Minutes per session: Not on file    Stress:  "Rather much   Relationships    Social connections:     Talks on phone: Not on file     Gets together: Not on file     Attends Jewish service: Not on file     Active member of club or organization: Not on file     Attends meetings of clubs or organizations: Not on file     Relationship status: Not on file   Other Topics Concern    Not on file   Social History Narrative    Not on file       Vitals:    05/27/19 1544   BP: 124/80   Pulse: 69   Weight: 96.4 kg (212 lb 8.4 oz)   Height: 5' 7" (1.702 m)       Hemoglobin A1C   Date Value Ref Range Status   04/24/2019 5.5 4.0 - 5.6 % Final     Comment:     ADA Screening Guidelines:  5.7-6.4%  Consistent with prediabetes  >or=6.5%  Consistent with diabetes  High levels of fetal hemoglobin interfere with the HbA1C  assay. Heterozygous hemoglobin variants (HbS, HgC, etc)do  not significantly interfere with this assay.   However, presence of multiple variants may affect accuracy.         Review of Systems   Constitutional: Negative for chills and fever.   Respiratory: Negative for shortness of breath.    Cardiovascular: Negative for chest pain, palpitations, orthopnea, claudication and leg swelling.   Gastrointestinal: Negative for diarrhea, nausea and vomiting.   Musculoskeletal: Negative for joint pain.   Skin: Negative for rash.   Neurological: Negative for dizziness, tingling, sensory change, focal weakness and weakness.   Psychiatric/Behavioral: Negative.              Objective:      PHYSICAL EXAM: Apperance: Alert and orient in no distress,well developed, and with good attention to grooming and body habits  Patient presents ambulating in flip flops  Lower Extremity Physical Exam:  VASCULAR: Dorsalis pedis pulses 2/4 bilateral and Posterior Tibial pulses 2/4 bilateral. Capillary fill time <4 seconds bilateral. No edema observed bilateral . Varicosities absent bilateral. Skin temperature of the lower extremities is warm to warm, proximal to distal. Hair growth " WNLbilateral.  DERMATOLOGICAL: No skin rashes, subcutaneous nodules, lesions, or ulcers observed bilateral.   NEUROLOGICAL: Light touch, sharp-dull, proprioception all present and equal bilaterally.   MUSCULOSKELETAL:Muscle strength is 5/5 for foot inverters, everters, plantarflexors, and dorsiflexors. Muscle tone is normal. Ankle joint ROM diminished  with no pain or crepitus bilateral ankle joints. Ankle joints bilateral demonstrate no evidence of subluxation, dislocation or laxity.  No pain to palpation bilateral medial foot at arch. Medial aspect of bilateral foot shows tenderness to palpation. No pain on along posterior tibial tendon. The general morphology of the foot is decreased arch height off weight bearing bilateral. + Hubscher maneuver bilateral.            Assessment:       Encounter Diagnoses   Name Primary?    Posterior tibial tendon dysfunction (PTTD) of both lower extremities Yes    Pes planus of both feet     Equinus contracture of ankle          Plan:   Posterior tibial tendon dysfunction (PTTD) of both lower extremities  -     ORTHOTIC DEVICE (DME)    Pes planus of both feet  -     ORTHOTIC DEVICE (DME)    Equinus contracture of ankle  -     ORTHOTIC DEVICE (DME)      I counseled the patient on his conditions, regarding findings of my examination, my impressions, and usual treatment plan.   I explained to the patient that etiology and treatment options for arch pain including ice message, new shoegear, orthotic inserts, NSAIDs, rest, strappings and tapings, stretching/strengthening including number and amounts of time each application.    Patient shown proper execution of stretching /strengthening exercise and instructed on correct number and amounts of time each stretch.    The patient and I reviewed the types of shoes he should be wearing, my recommendation includes generally the best time of the day for a shoe fitting is the afternoon, shoes with a wide toe box, very good cushion, and  tennis shoes with removable inner soles.  Prescription written for custom inserts.   Patient to return as needed.           Pradeep Da Silva DPM  [facility]

## 2019-05-28 NOTE — PROGRESS NOTES
Subjective:      Patient ID: Elder Hernandez is a 44 y.o. male.    Chief Complaint: Follow-up (1 month)    HPI     45 yo with   Patient Active Problem List   Diagnosis    PTSD (post-traumatic stress disorder)    Irritable bowel syndrome with diarrhea    Chronic kidney disease    Migraine with aura and without status migrainosus, not intractable    Erectile dysfunction    HSV-2 infection    HTN (hypertension)    Benign prostatic hyperplasia without lower urinary tract symptoms    Arthritis of both knees    Gout    Bilateral foot pain     Past Medical History:   Diagnosis Date    Acid reflux     Gout     Hypertension     Kidney problem     PTSD (post-traumatic stress disorder)     Sleep apnea      Here today for management of mult med problems as outlined below.  Compliant with meds without significant side effects. pt advised by renal to decrease allopurinol to 150mg.  No exercise since January.     Review of Systems   Constitutional: Negative for chills and fever.   HENT: Negative for ear pain and sore throat.    Respiratory: Negative for cough.    Cardiovascular: Negative for chest pain.   Gastrointestinal: Negative for abdominal pain and blood in stool.   Genitourinary: Negative for dysuria and hematuria.   Neurological: Negative for seizures and syncope.     Objective:   /86 (BP Location: Left arm, Patient Position: Sitting, BP Method: Large (Manual))   Temp 96.1 °F (35.6 °C) (Tympanic)   Wt 99.1 kg (218 lb 7.6 oz)   BMI 34.22 kg/m²     Physical Exam   Constitutional: He is oriented to person, place, and time. He appears well-developed and well-nourished. No distress.   HENT:   Head: Normocephalic and atraumatic.   Mouth/Throat: Oropharynx is clear and moist.   Eyes: Pupils are equal, round, and reactive to light. EOM are normal.   Neck: Neck supple. No thyromegaly present.   Cardiovascular: Normal rate and regular rhythm.   Pulmonary/Chest: Breath sounds normal. He has no wheezes. He  has no rales.   Abdominal: Soft. Bowel sounds are normal. There is no tenderness.   Lymphadenopathy:     He has no cervical adenopathy.   Neurological: He is alert and oriented to person, place, and time.   Skin: Skin is warm and dry.   Psychiatric: He has a normal mood and affect. His behavior is normal.       family history is not on file.    Assessment:     1. Essential hypertension    2. Elevated CK    3. At risk for coronary artery disease    4. Benign prostatic hyperplasia without lower urinary tract symptoms    5. PTSD (post-traumatic stress disorder)    6. HSV-2 infection    7. Preventative health care    8. Gout, unspecified cause, unspecified chronicity, unspecified site      Plan:   Essential hypertension  controlled  -     Discontinue: amLODIPine (NORVASC) 5 MG tablet; Take 1 tablet (5 mg total) by mouth once daily.  Dispense: 90 tablet; Refill: 3  -     losartan (COZAAR) 25 MG tablet; Take 1 tablet (25 mg total) by mouth once daily.  Dispense: 90 tablet; Refill: 3    Elevated CK  -     Aldolase; Future; Expected date: 05/28/2019  -     C-reactive protein; Future; Expected date: 05/28/2019  -     Sedimentation rate; Future; Expected date: 05/28/2019    Benign prostatic hyperplasia without lower urinary tract symptoms  controlled  -     prazosin (MINIPRESS) 2 MG Cap; Take 1 capsule (2 mg total) by mouth every evening.  Dispense: 90 capsule; Refill: 3    PTSD (post-traumatic stress disorder)  No current symptoms per pt.  Trazodone helps with sleep.    -     traZODone (DESYREL) 150 MG tablet; Take 1 tablet (150 mg total) by mouth every evening.  Dispense: 90 tablet; Refill: 2    HSV-2 infection  S/p recent flare  -     valACYclovir (VALTREX) 500 MG tablet; Take 1 tablet (500 mg total) by mouth 2 (two) times daily as needed.  Dispense: 30 tablet; Refill: 0    Preventative health care  -     HIV 1/2 Ag/Ab (4th Gen); Future; Expected date: 05/28/2019  -     EKG 12-lead; Future    Gout, unspecified cause,  unspecified chronicity, unspecified site  No recent flares  Allopurinol decreased by renal.   -     allopurinol (ZYLOPRIM) 300 MG tablet; Take 1/2 tablet by mouth once daily.  Dispense: 45 tablet; Refill: 1    Other orders  -     sertraline (ZOLOFT) 100 MG tablet; Take 1 tablet (100 mg total) by mouth once daily.  Dispense: 90 tablet; Refill: 3            Problem List Items Addressed This Visit        Psychiatric    PTSD (post-traumatic stress disorder)    Overview     History of ambien 2 to 3 times per week, changed to trazodone 50 in march.          Relevant Medications    traZODone (DESYREL) 150 MG tablet       Cardiac/Vascular    HTN (hypertension) - Primary    Relevant Medications    losartan (COZAAR) 25 MG tablet       Renal/    Benign prostatic hyperplasia without lower urinary tract symptoms    Relevant Medications    prazosin (MINIPRESS) 2 MG Cap       ID    HSV-2 infection    Relevant Medications    valACYclovir (VALTREX) 500 MG tablet       Orthopedic    Gout    Relevant Medications    allopurinol (ZYLOPRIM) 300 MG tablet      Other Visit Diagnoses     Elevated CK        Relevant Orders    Aldolase (Completed)    C-reactive protein (Completed)    Sedimentation rate (Completed)    At risk for coronary artery disease        Preventative health care        Relevant Orders    HIV 1/2 Ag/Ab (4th Gen) (Completed)    EKG 12-lead (Completed)          Follow up in about 6 months (around 11/28/2019), or if symptoms worsen or fail to improve.

## 2019-05-29 ENCOUNTER — LAB VISIT (OUTPATIENT)
Dept: LAB | Facility: HOSPITAL | Age: 44
End: 2019-05-29
Attending: INTERNAL MEDICINE
Payer: OTHER GOVERNMENT

## 2019-05-29 ENCOUNTER — OFFICE VISIT (OUTPATIENT)
Dept: NEPHROLOGY | Facility: CLINIC | Age: 44
End: 2019-05-29
Payer: OTHER GOVERNMENT

## 2019-05-29 VITALS
SYSTOLIC BLOOD PRESSURE: 102 MMHG | HEART RATE: 102 BPM | BODY MASS INDEX: 34.22 KG/M2 | DIASTOLIC BLOOD PRESSURE: 60 MMHG | WEIGHT: 218.06 LBS | HEIGHT: 67 IN

## 2019-05-29 DIAGNOSIS — N18.30 CHRONIC KIDNEY DISEASE, STAGE III (MODERATE): ICD-10-CM

## 2019-05-29 DIAGNOSIS — R80.9 PROTEINURIA, UNSPECIFIED TYPE: Primary | ICD-10-CM

## 2019-05-29 DIAGNOSIS — Z71.89 ENCOUNTER FOR MEDICATION REVIEW AND COUNSELING: ICD-10-CM

## 2019-05-29 DIAGNOSIS — I10 ESSENTIAL HYPERTENSION: ICD-10-CM

## 2019-05-29 LAB
ALDOLASE SERPL-CCNC: 3.6 U/L (ref 1.2–7.6)
HIV 1+2 AB+HIV1 P24 AG SERPL QL IA: NEGATIVE

## 2019-05-29 PROCEDURE — 99215 PR OFFICE/OUTPT VISIT, EST, LEVL V, 40-54 MIN: ICD-10-PCS | Mod: S$PBB,,, | Performed by: INTERNAL MEDICINE

## 2019-05-29 PROCEDURE — 99999 PR PBB SHADOW E&M-EST. PATIENT-LVL III: CPT | Mod: PBBFAC,,, | Performed by: INTERNAL MEDICINE

## 2019-05-29 PROCEDURE — 99215 OFFICE O/P EST HI 40 MIN: CPT | Mod: S$PBB,,, | Performed by: INTERNAL MEDICINE

## 2019-05-29 PROCEDURE — 80069 RENAL FUNCTION PANEL: CPT

## 2019-05-29 PROCEDURE — 99999 PR PBB SHADOW E&M-EST. PATIENT-LVL III: ICD-10-PCS | Mod: PBBFAC,,, | Performed by: INTERNAL MEDICINE

## 2019-05-29 PROCEDURE — 99213 OFFICE O/P EST LOW 20 MIN: CPT | Mod: PBBFAC,PN | Performed by: INTERNAL MEDICINE

## 2019-05-29 PROCEDURE — 36415 COLL VENOUS BLD VENIPUNCTURE: CPT

## 2019-05-29 RX ORDER — AMLODIPINE BESYLATE 2.5 MG/1
2.5 TABLET ORAL DAILY
Qty: 30 TABLET | Refills: 11 | Status: SHIPPED | OUTPATIENT
Start: 2019-05-29 | End: 2019-07-30 | Stop reason: SDUPTHER

## 2019-05-29 NOTE — PROGRESS NOTES
Nephrology clinic f/u note:  Date of clinic visit: 5/29/19    Referring physician: Dr. Baltazar  Reason for consult: CKD and proteinuria     HPI: Pt is a 43 y/o AA male with CKD stage 3 and proteinuria, who presents for f/u. Pt was initially seen by us about 3 weeks ago. Pt had previously seen a nephrology group in [place] and had a kidney biopsy done in 2014-15 for proteinuria. Attempt was made by us to obtain the result of the kidney biopsy, but we have not received a response yet. Proteinuria was quantified. Pt returns for f/u. He has no acute or new issues, no CP, no SOB, no blood in urine, urine is not dark. Denies using NSAIds. He has also reported nearly daily diarrhea from irritable bowel syndrome, no recent infection, no ab, no sx's of dysuria.     PAST MEDICAL HISTORY:  CKD stage 3, rhabdomyolysis, Acid reflux, irritable bowel syndrome, Gout, Hypertension, PTSD (post-traumatic stress disorder), and Sleep apnea.     PAST SURGICAL HISTORY:  He  has a past surgical history that includes Shoulder surgery (Left, 2015) and Renal biopsy (Right, 2015).     SOCIAL HISTORY:  He  reports that he has never smoked. He does not have any smokeless tobacco history on file. He reports that he drinks alcohol. He reports that he does not use drugs.     FAMILY MEDICAL HISTORY:  His family history is not on file.     Review of patient's allergies indicates:  No Known Allergies     Meds reveiwed  BMP  Lab Results   Component Value Date     05/06/2019    K 4.0 05/06/2019     05/06/2019    CO2 30 (H) 05/06/2019    BUN 15 05/06/2019    CREATININE 1.5 (H) 05/06/2019    CALCIUM 9.8 05/06/2019    ANIONGAP 8 05/06/2019    ESTGFRAFRICA >60.0 05/06/2019    EGFRNONAA 55.0 (A) 05/06/2019     Lab Results   Component Value Date    WBC 10.23 05/06/2019    HGB 14.2 05/06/2019    HCT 39.6 (L) 05/06/2019    MCV 88 05/06/2019     05/06/2019     Urine protein/Cr 910 mg  U/a: 2+ proteinuria, trace hematuria  ESR normal  CRP  "normal    PSA normal    Lab Results   Component Value Date    URICACID 5.6 04/24/2019           REVIEW OF SYSTEMS:  Patient has no fever, fatigue, visual changes, chest pain, edema, cough, dyspnea, nausea, vomiting, constipation, diarrhea, arthralgias, pruritis, dizziness, weakness, depression, confusion.      PHYSICAL EXAM:  Blood pressure 102/60, pulse 102, height 5' 7" (1.702 m), weight 98.9 kg (218 lb 0.6 oz).  Gen:    WDWN male in no apparent distress  Psych: Normal mood and affect  Skin:    No rashes or ulcers  Eyes:   Normal conjunctiva and lids, PERRLA  ENT:    Normal hearing with no oropharyngeal lesions  Neck:   No JVD  Chest:  Clear with no rales, rhonchi, wheezing with normal effort  CV:      Regular with no murmurs, gallops or rubs  Abd:     Soft, nontender, no distension, positive bowel sounds  Ext:      No cyanosis, clubbing or edema     Labs: reviewed  BMP        Lab Results   Component Value Date      04/24/2019     K 4.7 04/24/2019      04/24/2019     CO2 27 04/24/2019     BUN 14 04/24/2019     CREATININE 1.6 (H) 04/24/2019     CALCIUM 9.6 04/24/2019     ANIONGAP 9 04/24/2019     ESTGFRAFRICA 58.9 (A) 04/24/2019     EGFRNONAA 50.9 (A) 04/24/2019            Lab Results   Component Value Date     WBC 5.13 04/24/2019     HGB 14.1 04/24/2019     HCT 39.2 (L) 04/24/2019     MCV 87 04/24/2019      04/24/2019      Lab Results   Component Value Date    URICACID 5.6 04/24/2019       PSA 0.4  U/a: 2+ protein, trace blood, no RBC's, no casts  Urine protein/Cr 910 mg  ESR normal  CRP normal  HIV neg        IMPRESSION AND RECOMMENDATIONS:  45 y/o male with abnormal s Cr and proteinuria, and past h/o of rhabdomyolysis  The impression is:     1. Renal: s C r not worse, stable renal function.  CKD stage 3  DDX for rise in s Cr: has either an underlying intrinsic renal damage (also has proteinuria) or prerenal azotemia due to low BP/hypotension and nearly daily diarrhea from IBS.  Also, pt is " quite muscular, therefore, s Cr of 1.5 is not very high for him     Proteinuria is concerning, quantification shows sub-nephrotic range  HIV noted neg  Denies social risk factors for hep C, hep B  Likely will benefit from a kidney biopsy, pending the result of the kidney biopsy he did in [place] can not be found.  Will attempt to find the biopsy report from [place] before doing a biopsy here  No urgent need for repeat biopsy (stable renal function, not progressive, mild proteinuria)     HTN: BP controlled to low  Meds reviewed  Will adjust meds     2. Past h/o of rhabdomyolysis: likely unrelated  Due to strenuous physical activity     Plans and recommendations:  As discussed above  Opportunity for questions provided.  Will decrease amlodipine from 5 to 2.5 mg po qd  BMP today  RTC 3 months or sooner pending BMP results today or results of biopsy from [place]  Total time spent 40 minutes including time needed to review the records, the   patient evaluation, documentation, face-to-face discussion with the patient,   more than 50% of the time was spent on coordination of care and counseling.    Level V visit.     Eliel Bello

## 2019-05-30 LAB
ALBUMIN SERPL BCP-MCNC: 3.9 G/DL (ref 3.5–5.2)
ANION GAP SERPL CALC-SCNC: 9 MMOL/L (ref 8–16)
BUN SERPL-MCNC: 13 MG/DL (ref 6–20)
CALCIUM SERPL-MCNC: 9.9 MG/DL (ref 8.7–10.5)
CHLORIDE SERPL-SCNC: 104 MMOL/L (ref 95–110)
CO2 SERPL-SCNC: 25 MMOL/L (ref 23–29)
CREAT SERPL-MCNC: 1.6 MG/DL (ref 0.5–1.4)
EST. GFR  (AFRICAN AMERICAN): 58.9 ML/MIN/1.73 M^2
EST. GFR  (NON AFRICAN AMERICAN): 50.9 ML/MIN/1.73 M^2
GLUCOSE SERPL-MCNC: 72 MG/DL (ref 70–110)
PHOSPHATE SERPL-MCNC: 3.2 MG/DL (ref 2.7–4.5)
POTASSIUM SERPL-SCNC: 4 MMOL/L (ref 3.5–5.1)
SODIUM SERPL-SCNC: 138 MMOL/L (ref 136–145)

## 2019-05-31 ENCOUNTER — TELEPHONE (OUTPATIENT)
Dept: NEPHROLOGY | Facility: CLINIC | Age: 44
End: 2019-05-31

## 2019-05-31 NOTE — TELEPHONE ENCOUNTER
----- Message from Eileen Platt sent at 5/31/2019  9:45 AM CDT -----  Contact: pt  Pt request a call back regarding to verify if biopsy report received from [facility] from [place]  ...[phone] (home)

## 2019-05-31 NOTE — PROGRESS NOTES
Renal addendum note:  Received results of the kidney biopsy pt had done in [place] on 5/3/2016.  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  Pt had glomerulomegaly, more c/w with a secondary cause for FSGS    The nephrologist's note from [facility] indicated that pt had had multiple episodes of rhabdomyolysis, not just one as pt had told us.  Presumably pt may have had episodes of ANGELA as a results of multiple times of having rhabdomyolysis, which is the more likely cause of the current CKD, rather than secondary FSGS being the cause of his renal insufficiency.  Secondary FSGS usually does not cause renal failure. It does cause proteinuria, even nephrotic syndrome.    Eliel Bello MD

## 2019-07-30 ENCOUNTER — OFFICE VISIT (OUTPATIENT)
Dept: INTERNAL MEDICINE | Facility: CLINIC | Age: 44
End: 2019-07-30
Payer: OTHER GOVERNMENT

## 2019-07-30 ENCOUNTER — PATIENT MESSAGE (OUTPATIENT)
Dept: ADMINISTRATIVE | Facility: OTHER | Age: 44
End: 2019-07-30

## 2019-07-30 VITALS
OXYGEN SATURATION: 98 % | DIASTOLIC BLOOD PRESSURE: 87 MMHG | HEART RATE: 62 BPM | TEMPERATURE: 96 F | BODY MASS INDEX: 35.05 KG/M2 | WEIGHT: 223.75 LBS | SYSTOLIC BLOOD PRESSURE: 122 MMHG

## 2019-07-30 DIAGNOSIS — I10 ESSENTIAL HYPERTENSION: ICD-10-CM

## 2019-07-30 DIAGNOSIS — G43.109 MIGRAINE WITH AURA AND WITHOUT STATUS MIGRAINOSUS, NOT INTRACTABLE: Primary | ICD-10-CM

## 2019-07-30 PROCEDURE — 99214 OFFICE O/P EST MOD 30 MIN: CPT | Mod: S$PBB,,, | Performed by: INTERNAL MEDICINE

## 2019-07-30 PROCEDURE — 99213 OFFICE O/P EST LOW 20 MIN: CPT | Mod: PBBFAC | Performed by: INTERNAL MEDICINE

## 2019-07-30 PROCEDURE — 99999 PR PBB SHADOW E&M-EST. PATIENT-LVL III: CPT | Mod: PBBFAC,,, | Performed by: INTERNAL MEDICINE

## 2019-07-30 PROCEDURE — 99214 PR OFFICE/OUTPT VISIT, EST, LEVL IV, 30-39 MIN: ICD-10-PCS | Mod: S$PBB,,, | Performed by: INTERNAL MEDICINE

## 2019-07-30 PROCEDURE — 99999 PR PBB SHADOW E&M-EST. PATIENT-LVL III: ICD-10-PCS | Mod: PBBFAC,,, | Performed by: INTERNAL MEDICINE

## 2019-07-30 RX ORDER — AMLODIPINE BESYLATE 2.5 MG/1
2.5 TABLET ORAL DAILY
Qty: 90 TABLET | Refills: 1 | Status: SHIPPED | OUTPATIENT
Start: 2019-07-30 | End: 2020-01-15 | Stop reason: SDUPTHER

## 2019-07-30 RX ORDER — RIZATRIPTAN BENZOATE 10 MG/1
10 TABLET, ORALLY DISINTEGRATING ORAL DAILY PRN
Qty: 30 TABLET | Refills: 0 | Status: SHIPPED | OUTPATIENT
Start: 2019-07-30 | End: 2021-03-15 | Stop reason: SDUPTHER

## 2019-07-30 RX ORDER — TOPIRAMATE 25 MG/1
25 TABLET ORAL 2 TIMES DAILY
Qty: 60 TABLET | Refills: 1 | Status: SHIPPED | OUTPATIENT
Start: 2019-07-30 | End: 2019-08-30 | Stop reason: SDUPTHER

## 2019-07-30 NOTE — PROGRESS NOTES
Subjective:      Patient ID: Elder Hernandez is a 44 y.o. male.    Chief Complaint: Migraine    HPI   45 yo with   Patient Active Problem List   Diagnosis    PTSD (post-traumatic stress disorder)    Irritable bowel syndrome with diarrhea    Chronic kidney disease    Migraine with aura and without status migrainosus, not intractable    Erectile dysfunction    HSV-2 infection    HTN (hypertension)    Benign prostatic hyperplasia without lower urinary tract symptoms    Arthritis of both knees    Gout    Bilateral foot pain     Past Medical History:   Diagnosis Date    Acid reflux     Gout     Hypertension     Kidney problem     PTSD (post-traumatic stress disorder)     Sleep apnea      Here today for management of migraine HAs and bp.   Feels migraine nearly daily x 2 weeks. maxalt not as helpful. Last headache 4 to 5 days.   Not monitoring home bp. Similar to previous migraine HA. Thinks stress may be triggering.   rep  Review of Systems   Constitutional: Negative for chills and fever.   HENT: Negative for ear pain and sore throat.    Respiratory: Negative for cough.    Cardiovascular: Negative for chest pain.   Gastrointestinal: Negative for abdominal pain and blood in stool.   Genitourinary: Negative for dysuria and hematuria.   Neurological: Positive for headaches. Negative for dizziness, tremors, seizures, syncope, facial asymmetry, speech difficulty, weakness, light-headedness and numbness.     Objective:   /87   Pulse 62   Temp (!) 95.6 °F (35.3 °C) (Tympanic)   Wt 101.5 kg (223 lb 12.3 oz)   SpO2 98%   BMI 35.05 kg/m²     Physical Exam   Constitutional: He is oriented to person, place, and time. He appears well-developed and well-nourished. No distress.   HENT:   Head: Normocephalic and atraumatic.   Eyes: Conjunctivae and EOM are normal.   Neck: Normal range of motion.   Cardiovascular: Normal rate and regular rhythm.   Pulmonary/Chest: Effort normal and breath sounds normal.    Musculoskeletal: He exhibits no edema.   Neurological: He is alert and oriented to person, place, and time.   Skin: Skin is warm and dry.   Psychiatric: He has a normal mood and affect. His behavior is normal.       Assessment:     1. Migraine with aura and without status migrainosus, not intractable    2. Essential hypertension      Plan:   Migraine with aura and without status migrainosus, not intractable  Worsening in frequency.  Try Topamax prophylactically.  -     topiramate (TOPAMAX) 25 MG tablet; Take 1 tablet (25 mg total) by mouth 2 (two) times daily. One at night for first week.  Dispense: 60 tablet; Refill: 1  -     rizatriptan (MAXALT-MLT) 10 MG disintegrating tablet; Take 1 tablet (10 mg total) by mouth daily as needed for Migraine. May repeat in 2 hours if needed  Dispense: 30 tablet; Refill: 0    Essential hypertension  Previously better controlled.  Continue current medications try digital Hypertension program  -     NURSING COMMUNICATION: Create [facility]  -     Hypertension Digital Medicine (HDMP) Enrollment Order  -     [facility] Assign Onboarding Questionnaires  -     amLODIPine (NORVASC) 2.5 MG tablet; Take 1 tablet (2.5 mg total) by mouth once daily.  Dispense: 90 tablet; Refill: 1        Lab Frequency Next Occurrence   Renal function panel Once 05/29/2019       Problem List Items Addressed This Visit        Neuro    Migraine with aura and without status migrainosus, not intractable - Primary    Relevant Medications    topiramate (TOPAMAX) 25 MG tablet    rizatriptan (MAXALT-MLT) 10 MG disintegrating tablet       Cardiac/Vascular    HTN (hypertension)    Relevant Medications    amLODIPine (NORVASC) 2.5 MG tablet    Other Relevant Orders    NURSING COMMUNICATION: Create [facility] Account    Hypertension Digital Medicine (HDMP) Enrollment Order (Completed)    Hypertension Digital Medicine (HDMP): Assign Onboarding Questionnaires (Completed)          Follow up  in about 4 weeks (around 8/27/2019), or if symptoms worsen or fail to improve.

## 2019-08-19 ENCOUNTER — LAB VISIT (OUTPATIENT)
Dept: LAB | Facility: HOSPITAL | Age: 44
End: 2019-08-19
Attending: INTERNAL MEDICINE
Payer: OTHER GOVERNMENT

## 2019-08-19 DIAGNOSIS — I10 ESSENTIAL HYPERTENSION: ICD-10-CM

## 2019-08-19 LAB
ALBUMIN SERPL BCP-MCNC: 3.7 G/DL (ref 3.5–5.2)
ANION GAP SERPL CALC-SCNC: 9 MMOL/L (ref 8–16)
BUN SERPL-MCNC: 18 MG/DL (ref 6–20)
CALCIUM SERPL-MCNC: 9.1 MG/DL (ref 8.7–10.5)
CHLORIDE SERPL-SCNC: 110 MMOL/L (ref 95–110)
CO2 SERPL-SCNC: 22 MMOL/L (ref 23–29)
CREAT SERPL-MCNC: 1.7 MG/DL (ref 0.5–1.4)
EST. GFR  (AFRICAN AMERICAN): 54.7 ML/MIN/1.73 M^2
EST. GFR  (NON AFRICAN AMERICAN): 47.3 ML/MIN/1.73 M^2
GLUCOSE SERPL-MCNC: 80 MG/DL (ref 70–110)
PHOSPHATE SERPL-MCNC: 2.7 MG/DL (ref 2.7–4.5)
POTASSIUM SERPL-SCNC: 4.1 MMOL/L (ref 3.5–5.1)
SODIUM SERPL-SCNC: 141 MMOL/L (ref 136–145)

## 2019-08-19 PROCEDURE — 80069 RENAL FUNCTION PANEL: CPT

## 2019-08-19 PROCEDURE — 36415 COLL VENOUS BLD VENIPUNCTURE: CPT

## 2019-08-21 ENCOUNTER — PATIENT OUTREACH (OUTPATIENT)
Dept: OTHER | Facility: OTHER | Age: 44
End: 2019-08-21

## 2019-08-21 NOTE — PROGRESS NOTES
1st attempt to complete enrollment call. No answer, mailbox full.       Last 5 Patient Entered Readings                                      Current 30 Day Average: 139/95     Recent Readings 8/21/2019 8/21/2019 8/20/2019 8/15/2019 8/11/2019    SBP (mmHg) 130 149 126 132 142    DBP (mmHg) 91 105 92 97 93    Pulse 72 63 68 71 65

## 2019-08-21 NOTE — LETTER
November 22, 2019     Elder Hernandez  [address] [id number]       Dear Elder,    Welcome to [email or web address]! Our goal is to make care effective, proactive and convenient by using data you send us from home to better treat your chronic conditions.              My name is Gloria Jacques, and I am your dedicated Digital Medicine clinician. As an expert in medication management, I will help ensure that the medications you are taking continue to provide the intended benefits and help you reach your goals. You can reach me directly at [phone] or by sending me a message directly through your [facility] account.      I am Saumya Garcia and I will be your health . My job is to help you identify lifestyle changes to improve your disease control. We will talk about nutrition, exercise, and other ways you may be able to adjust your current habits to better your health. Additionally, we will help ensure you are completing the tests and screenings that are necessary to help manage your conditions. You can reach me directly at [phone] or by sending me a message directly through your [facility] account.    Most importantly, YOU are at the center of this team. Together, we will work to improve your overall health and encourage you to meet your goals for a healthier lifestyle.     What we expect from YOU:  · Please take frequent home blood pressure measurements. We ask that you take at least 1 blood pressure reading per week, but more information will better help us get you know you. Be sure you rest for a few minutes before taking the reading in a quiet, comfortable place.     Be available to receive phone calls or [facility] messages, when appropriate, from your care team. Please let us know if there are any specific days or times that work best for us to reach you via phone.     Complete routine tests and screenings. Dont worry, we will help keep you on track!            What you should expect from your Digital Medicine Care Team:   We will work with you to create a personalized plan of care and provide you with encouragement and education, including regarding lifestyle changes, that could help you manage your disease states.     We will adjust your current medications, if needed, and continue to monitor your long-term progress.     We will provide you and your physician with monthly progress reports after you have been in the program for more than 30 days.     We will send you reminders through [facility] and text messages to help ensure you do not miss any testing deadlines to help manage your disease states.    You will be able to reach us by phone or through your [facility] account by clicking our names under Care Team on the right side of the home screen.    I look forward to working with you to achieve your blood pressure goals!    We look forward to working with you to help manage your health,    Sincerely,    Your Digital Medicine Team    Please visit our websites to learn more:   · Hypertension: [email or web address]      Remember, we are not available for emergencies. If you have an emergency, please contact your doctors office directly or call [facility] on-call ([phone] or [phone]) or [phone].

## 2019-08-21 NOTE — LETTER
November 22, 2019     Elder Hernandez  [address] [address]       Dear Elder,    Welcome to [facility] Our goal is to make care effective, proactive and convenient by using data you send us from home to better treat your chronic conditions.              My name is Gloria Jacques, and I am your dedicated Digital Medicine clinician. As an expert in medication management, I will help ensure that the medications you are taking continue to provide the intended benefits and help you reach your goals. You can reach me directly at [phone] or by sending me a message directly through your [facility] account.      I am Saumya Garcia and I will be your health . My job is to help you identify lifestyle changes to improve your disease control. We will talk about nutrition, exercise, and other ways you may be able to adjust your current habits to better your health. Additionally, we will help ensure you are completing the tests and screenings that are necessary to help manage your conditions. You can reach me directly at [phone] or by sending me a message directly through your [facility] account.    Most importantly, YOU are at the center of this team. Together, we will work to improve your overall health and encourage you to meet your goals for a healthier lifestyle.     What we expect from YOU:  · Please take frequent home blood pressure measurements. We ask that you take at least 1 blood pressure reading per week, but more information will better help us get you know you. Be sure you rest for a few minutes before taking the reading in a quiet, comfortable place.     Be available to receive phone calls or [facility] messages, when appropriate, from your care team. Please let us know if there are any specific days or times that work best for us to reach you via phone.     Complete routine tests and screenings. Dont worry, we will help keep you on track!            What you should expect from your Digital Medicine Care Team:   We will work with you to create a personalized plan of care and provide you with encouragement and education, including regarding lifestyle changes, that could help you manage your disease states.     We will adjust your current medications, if needed, and continue to monitor your long-term progress.     We will provide you and your physician with monthly progress reports after you have been in the program for more than 30 days.     We will send you reminders through [facility] and text messages to help ensure you do not miss any testing deadlines to help manage your disease states.    You will be able to reach us by phone or through your [facility] account by clicking our names under Care Team on the right side of the home screen.    I look forward to working with you to achieve your blood pressure goals!    We look forward to working with you to help manage your health,    Sincerely,    Your Digital Medicine Team    Please visit our websites to learn more:   · Hypertension: [email or web address]      Remember, we are not available for emergencies. If you have an emergency, please contact your doctors office directly or call [facility] on-call ([phone] or [phone]) or [phone].

## 2019-08-26 ENCOUNTER — OFFICE VISIT (OUTPATIENT)
Dept: NEPHROLOGY | Facility: CLINIC | Age: 44
End: 2019-08-26
Payer: OTHER GOVERNMENT

## 2019-08-26 ENCOUNTER — LAB VISIT (OUTPATIENT)
Dept: LAB | Facility: HOSPITAL | Age: 44
End: 2019-08-26
Attending: INTERNAL MEDICINE
Payer: OTHER GOVERNMENT

## 2019-08-26 VITALS
HEART RATE: 80 BPM | BODY MASS INDEX: 34.67 KG/M2 | DIASTOLIC BLOOD PRESSURE: 90 MMHG | SYSTOLIC BLOOD PRESSURE: 138 MMHG | WEIGHT: 220.88 LBS | RESPIRATION RATE: 20 BRPM | HEIGHT: 67 IN

## 2019-08-26 DIAGNOSIS — T79.6XXD TRAUMATIC RHABDOMYOLYSIS, SUBSEQUENT ENCOUNTER: ICD-10-CM

## 2019-08-26 DIAGNOSIS — N18.30 STAGE 3 CHRONIC KIDNEY DISEASE: ICD-10-CM

## 2019-08-26 DIAGNOSIS — N18.30 STAGE 3 CHRONIC KIDNEY DISEASE: Primary | ICD-10-CM

## 2019-08-26 DIAGNOSIS — R19.7 DIARRHEA, UNSPECIFIED TYPE: ICD-10-CM

## 2019-08-26 DIAGNOSIS — Z71.89 ENCOUNTER FOR MEDICATION REVIEW AND COUNSELING: ICD-10-CM

## 2019-08-26 DIAGNOSIS — N05.1 FSGS (FOCAL SEGMENTAL GLOMERULOSCLEROSIS): ICD-10-CM

## 2019-08-26 PROBLEM — M62.82 RHABDOMYOLYSIS: Status: ACTIVE | Noted: 2019-08-26

## 2019-08-26 LAB — CK SERPL-CCNC: 543 U/L (ref 20–200)

## 2019-08-26 PROCEDURE — 82550 ASSAY OF CK (CPK): CPT

## 2019-08-26 PROCEDURE — 36415 COLL VENOUS BLD VENIPUNCTURE: CPT

## 2019-08-26 PROCEDURE — 99215 PR OFFICE/OUTPT VISIT, EST, LEVL V, 40-54 MIN: ICD-10-PCS | Mod: S$PBB,,, | Performed by: INTERNAL MEDICINE

## 2019-08-26 PROCEDURE — 99213 OFFICE O/P EST LOW 20 MIN: CPT | Mod: PBBFAC | Performed by: INTERNAL MEDICINE

## 2019-08-26 PROCEDURE — 99215 OFFICE O/P EST HI 40 MIN: CPT | Mod: S$PBB,,, | Performed by: INTERNAL MEDICINE

## 2019-08-26 PROCEDURE — 99999 PR PBB SHADOW E&M-EST. PATIENT-LVL III: CPT | Mod: PBBFAC,,, | Performed by: INTERNAL MEDICINE

## 2019-08-26 PROCEDURE — 99999 PR PBB SHADOW E&M-EST. PATIENT-LVL III: ICD-10-PCS | Mod: PBBFAC,,, | Performed by: INTERNAL MEDICINE

## 2019-08-26 NOTE — PROGRESS NOTES
"Nephrology clinic f/u note:  Date of clinic visit: 8/26/19     Referring physician: Dr. Baltazar  Reason for f/u and chief c/o: CKD and proteinuria     HPI: Pt is a 43 y/o AA male with CKD stage 3 and proteinuria, who presents for f/u. Pt was initially seen by us about 3 months ago. Pt previously had seen a nephrology group in [place] and had a kidney biopsy done in 2014-15 for proteinuria. This renal report was received by us and reviewed. 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 He also has multiple episodes of rhabdomyolysis, treated by the [place] nephrologist. 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 Pt reported previously that he had seen "dark colored urine" after exercising.    Pt returns for f/u. He has no acute or new issues, no CP, no SOB, no blood in urine, urine is not dark. Denies using NSAIds. He has also reported nearly daily diarrhea from irritable bowel syndrome, no recent infection, no ab, no sx's of dysuria. He does report that sometimes he has diarrhea. Labs and meds reviewed with him. He is on 2 linzess fro IBS with diarrhea as side effect, he was advised. He also drinks a lot of high sugar drinks. He was advised that such drinks can cause osmotic diarrhea.     PAST MEDICAL HISTORY:  CKD stage 3, rhabdomyolysis, Acid reflux, irritable bowel syndrome, Gout, Hypertension, PTSD (post-traumatic stress disorder), and Sleep apnea.     PAST SURGICAL HISTORY:  He  has a past surgical history that includes Shoulder surgery (Left, 2015) and Renal biopsy (Right, 2015).     SOCIAL HISTORY:  He  reports that he has never smoked. He does not have any smokeless tobacco history on file. He reports that he drinks alcohol. He reports that he does not use drugs.     FAMILY MEDICAL HISTORY:  His family history is not on file.     Review of patient's allergies " "indicates:  No Known Allergies     Meds reviewed    Current Outpatient Medications:     allopurinol (ZYLOPRIM) 300 MG tablet, Take 1/2 tablet by mouth once daily., Disp: 45 tablet, Rfl: 1    amLODIPine (NORVASC) 2.5 MG tablet, Take 1 tablet (2.5 mg total) by mouth once daily., Disp: 90 tablet, Rfl: 1    linaclotide (LINZESS ORAL), Take 1 tablet by mouth once daily., Disp: , Rfl:     losartan (COZAAR) 25 MG tablet, Take 1 tablet (25 mg total) by mouth once daily., Disp: 90 tablet, Rfl: 3    MULTIVITAMIN ORAL, Take 1 tablet by mouth once daily., Disp: , Rfl:     prazosin (MINIPRESS) 2 MG Cap, Take 1 capsule (2 mg total) by mouth every evening., Disp: 90 capsule, Rfl: 3    predniSONE (DELTASONE) 10 MG tablet, Take 20 mg by mouth once daily., Disp: , Rfl:     rizatriptan (MAXALT-MLT) 10 MG disintegrating tablet, Take 1 tablet (10 mg total) by mouth daily as needed for Migraine. May repeat in 2 hours if needed, Disp: 30 tablet, Rfl: 0    sertraline (ZOLOFT) 100 MG tablet, Take 1 tablet (100 mg total) by mouth once daily., Disp: 90 tablet, Rfl: 3    sildenafil (VIAGRA) 100 MG tablet, Take 1 tablet (100 mg total) by mouth daily as needed for Erectile Dysfunction., Disp: 12 tablet, Rfl: 0    topiramate (TOPAMAX) 25 MG tablet, Take 1 tablet (25 mg total) by mouth 2 (two) times daily. One at night for first week., Disp: 60 tablet, Rfl: 1    traZODone (DESYREL) 150 MG tablet, Take 1 tablet (150 mg total) by mouth every evening., Disp: 90 tablet, Rfl: 2    valACYclovir (VALTREX) 500 MG tablet, Take 1 tablet (500 mg total) by mouth 2 (two) times daily as needed., Disp: 30 tablet, Rfl: 0        REVIEW OF SYSTEMS:  Patient has no fever, fatigue, visual changes, chest pain, edema, cough, dyspnea, nausea, vomiting, constipation, diarrhea, arthralgias, pruritis, dizziness, weakness, depression, confusion.      PHYSICAL EXAM:  Blood pressure 102/60, pulse 102, height 5' 7" (1.702 m), weight 98.9 kg (218 lb 0.6 " oz).  Gen:    WDWN male in no apparent distress  Psych: Normal mood and affect  Skin:    No rashes or ulcers  Eyes:   Normal conjunctiva and lids, PERRLA  ENT:    Normal hearing with no oropharyngeal lesions  Neck:   No JVD  Chest:  Clear with no rales, rhonchi, wheezing with normal effort  CV:      Regular with no murmurs, gallops or rubs  Abd:     Soft, nontender, no distension, positive bowel sounds  Ext:      No cyanosis, clubbing or edema     Labs: reviewed  BMP  Lab Results   Component Value Date     08/19/2019    K 4.1 08/19/2019     08/19/2019    CO2 22 (L) 08/19/2019    BUN 18 08/19/2019    CREATININE 1.7 (H) 08/19/2019    CALCIUM 9.1 08/19/2019    ANIONGAP 9 08/19/2019    ESTGFRAFRICA 54.7 (A) 08/19/2019    EGFRNONAA 47.3 (A) 08/19/2019     Lab Results   Component Value Date    WBC 10.23 05/06/2019    HGB 14.2 05/06/2019    HCT 39.6 (L) 05/06/2019    MCV 88 05/06/2019     05/06/2019     's     PSA 0.4  U/a: 2+ protein, trace blood, no RBC's, no casts  Urine protein/Cr 910 mg  ESR normal  CRP normal  HIV neg        IMPRESSION AND RECOMMENDATIONS:  45 y/o male with abnormal s Cr and proteinuria, and past h/o of rhabdomyolysis  The impression is:     1. Renal: s Cr slightly worse, may be due to diarrhea pt is reporting  Overall, stable renal function.  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 It does cause proteinuria, even nephrotic syndrome.  Secondary FSGS is usually caused by renal hyperfilteration     2. Past h/o of rhabdomyolysis: likely  unrelated  Due to strenuous physical activity    3. Rise in CK: discussed with pt  Suspect due to strenuous physical   DDX: due to thyroid disease (TSH was normal) vs genetic/mitochondrial disease (very rate, very unlikely)       Plans and recommendations:  As discussed above  Opportunity for questions provided.  Repeat CK without pt having exercised  Repeat u/a  RTC 2 months or sooner pending BMP results today or results of biopsy from [place]  Total time spent 40 minutes including time needed to review the records, the   patient evaluation, documentation, face-to-face discussion with the patient,   more than 50% of the time was spent on coordination of care and counseling.    Level V visit.     Eliel Bello

## 2019-08-27 ENCOUNTER — TELEPHONE (OUTPATIENT)
Dept: NEPHROLOGY | Facility: CLINIC | Age: 44
End: 2019-08-27

## 2019-08-27 ENCOUNTER — TELEPHONE (OUTPATIENT)
Dept: RHEUMATOLOGY | Facility: CLINIC | Age: 44
End: 2019-08-27

## 2019-08-27 ENCOUNTER — TELEPHONE (OUTPATIENT)
Dept: INTERNAL MEDICINE | Facility: CLINIC | Age: 44
End: 2019-08-27

## 2019-08-27 DIAGNOSIS — N18.30 STAGE 3 CHRONIC KIDNEY DISEASE: Primary | ICD-10-CM

## 2019-08-27 DIAGNOSIS — R74.8 ELEVATED CK: Primary | ICD-10-CM

## 2019-08-27 NOTE — PROGRESS NOTES
Renal clinic chart check note:  Noted CK level is very slightly elevated without pt having participated in any sort of exercise at all, between 543 to 830.  Pt appears to have a tendency to develop rhabdomyolysis easily.  Could pt have an endocrine or genetic/myopathy/mitochondrial or rheumatological cause of elevated CK?  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   Discussed with Dr. Baltazar, PCP.  Pt will be sent to Rheumatology for their opinion.  I believe Genetic Depts [facility] at [facility] or [facility] are also available fori consultation.  Will f/u.    Eliel Bello MD

## 2019-08-27 NOTE — TELEPHONE ENCOUNTER
----- Message from Sharona Shepherd MA sent at 8/27/2019  3:27 PM CDT -----  Contact: Pt  This looks like a new pat for you guys according to the chart   ----- Message -----  From: Aicha Cooper LPN  Sent: 8/27/2019   2:48 PM  To: [facility] Clinical Support    Please return his call.  ----- Message -----  From: Susi Kong  Sent: 8/27/2019   2:22 PM  To: [facility] Staff    .Type:  Patient Returning Call    Who Called: Pt   Who Left Message for Patient: Nurse   Does the patient know what this is regarding?: return call   Would the patient rather a call back or a response via [facility]? Call back   Best Call Back Number: .[phone] (home)   Additional Information:

## 2019-08-27 NOTE — TELEPHONE ENCOUNTER
----- Message from Susi Kong sent at 8/27/2019  2:22 PM CDT -----  Contact: Pt  .Type:  Patient Returning Call    Who Called: Pt   Who Left Message for Patient: Nurse   Does the patient know what this is regarding?: return call   Would the patient rather a call back or a response via [facility]? Call back   Best Call Back Number: .[phone] (home)   Additional Information:

## 2019-08-27 NOTE — TELEPHONE ENCOUNTER
"----- Message from Eliel Bello MD sent at 8/27/2019  9:11 AM CDT -----  Gamaliel Reilly,  We have this shared pt. He has some issues related to kidney biopsy proven FSGS in [place] and rhabdomyolysis. The former is less of an issue. The latter is my concern, and that's why I am sending this note to you. 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. 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 He says he gets rhabdomyolysis easily, actually documented in the renal note from [place]. I am now finding he has very mild elevated CK at rest (no prior exercise). I am wondering if he might have an endocrine (TSH was normal), or a rare genetic/michochondrial or myopathy cause for this. Could I ask you what you think or who he should be referred to?     "

## 2019-08-27 NOTE — TELEPHONE ENCOUNTER
My understanding is that rheumatologist have some of the best knowledge about elevated CK levels. I have placed a referral and my staff will set up the appt with rheumatology for him. Thanks.

## 2019-08-29 NOTE — PROGRESS NOTES
In attempt to complete enrollment call pt request to return the call later today.      Last 5 Patient Entered Readings                                      Current 30 Day Average: 137/94     Recent Readings 8/29/2019 8/26/2019 8/24/2019 8/23/2019 8/22/2019    SBP (mmHg) 128 143 132 126 137    DBP (mmHg) 91 100 87 81 92    Pulse 88 72 57 83 59

## 2019-08-30 ENCOUNTER — OFFICE VISIT (OUTPATIENT)
Dept: INTERNAL MEDICINE | Facility: CLINIC | Age: 44
End: 2019-08-30
Payer: OTHER GOVERNMENT

## 2019-08-30 VITALS
OXYGEN SATURATION: 96 % | DIASTOLIC BLOOD PRESSURE: 86 MMHG | SYSTOLIC BLOOD PRESSURE: 108 MMHG | BODY MASS INDEX: 33.7 KG/M2 | HEART RATE: 65 BPM | WEIGHT: 215.19 LBS | TEMPERATURE: 96 F

## 2019-08-30 DIAGNOSIS — N18.9 CHRONIC KIDNEY DISEASE, UNSPECIFIED CKD STAGE: ICD-10-CM

## 2019-08-30 DIAGNOSIS — I10 ESSENTIAL HYPERTENSION: ICD-10-CM

## 2019-08-30 DIAGNOSIS — R74.8 ELEVATED CK: ICD-10-CM

## 2019-08-30 DIAGNOSIS — G43.109 MIGRAINE WITH AURA AND WITHOUT STATUS MIGRAINOSUS, NOT INTRACTABLE: Primary | ICD-10-CM

## 2019-08-30 DIAGNOSIS — K58.0 IRRITABLE BOWEL SYNDROME WITH DIARRHEA: ICD-10-CM

## 2019-08-30 PROCEDURE — 99214 OFFICE O/P EST MOD 30 MIN: CPT | Mod: PBBFAC | Performed by: INTERNAL MEDICINE

## 2019-08-30 PROCEDURE — 99999 PR PBB SHADOW E&M-EST. PATIENT-LVL IV: ICD-10-PCS | Mod: PBBFAC,,, | Performed by: INTERNAL MEDICINE

## 2019-08-30 PROCEDURE — 99214 PR OFFICE/OUTPT VISIT, EST, LEVL IV, 30-39 MIN: ICD-10-PCS | Mod: S$PBB,,, | Performed by: INTERNAL MEDICINE

## 2019-08-30 PROCEDURE — 99999 PR PBB SHADOW E&M-EST. PATIENT-LVL IV: CPT | Mod: PBBFAC,,, | Performed by: INTERNAL MEDICINE

## 2019-08-30 PROCEDURE — 99214 OFFICE O/P EST MOD 30 MIN: CPT | Mod: S$PBB,,, | Performed by: INTERNAL MEDICINE

## 2019-08-30 RX ORDER — TOPIRAMATE 25 MG/1
25 TABLET ORAL 2 TIMES DAILY
Qty: 180 TABLET | Refills: 0 | Status: SHIPPED | OUTPATIENT
Start: 2019-08-30 | End: 2020-03-06 | Stop reason: SDUPTHER

## 2019-08-30 NOTE — PROGRESS NOTES
Subjective:      Patient ID: Elder Hernandez is a 44 y.o. male.    Chief Complaint: Follow-up    HPI     43 yo with   Patient Active Problem List   Diagnosis    PTSD (post-traumatic stress disorder)    Irritable bowel syndrome with diarrhea    Chronic kidney disease    Migraine with aura and without status migrainosus, not intractable    Erectile dysfunction    HSV-2 infection    HTN (hypertension)    Benign prostatic hyperplasia without lower urinary tract symptoms    Arthritis of both knees    Gout    Bilateral foot pain    FSGS (focal segmental glomerulosclerosis)    Rhabdomyolysis     Past Medical History:   Diagnosis Date    Acid reflux     Gout     Hypertension     Kidney problem     PTSD (post-traumatic stress disorder)     Sleep apnea      Here today for management multiple medical problems as outlined below.  He feels that his headaches have improved on the Topamax.  He thinks he is had approximately 1 headache over the last 2 weeks.  This was satisfactorily resolved with Maxalt.  His blood pressure has been doing well.  He has been in the hypertensive digital program with good readings as below.  Recent Readings 8/30/2019 8/29/2019 8/26/2019 8/24/2019 8/23/2019   SBP (mmHg) 122 128 143 132 126   DBP (mmHg) 87 91 100 87 81   Pulse 66 88 72 57 83   He reports that he has had a real bowel syndrome for years.  He has been seen by Gastroenterology in the past.  He had been prescribed Linzess.  He feels that he has had intermittent abdominal cramping that resolved after bowel movements for years.  He feels symptoms may have worsened over the past months and he therefor dc'd his linzess.  This has not produced a significant improvement in symptoms.  There is no blood in his stool.  There is no dysphagia or odynophagia    Review of Systems   Constitutional: Negative for chills and fever.   HENT: Negative for ear pain and sore throat.    Respiratory: Negative for cough.    Cardiovascular:  Negative for chest pain.   Gastrointestinal: Negative for abdominal pain and blood in stool.   Genitourinary: Negative for dysuria and hematuria.   Neurological: Negative for seizures and syncope.     Objective:   /86 (BP Location: Left arm, Patient Position: Sitting, BP Method: Large (Manual))   Pulse 65   Temp 96.4 °F (35.8 °C) (Tympanic)   Wt 97.6 kg (215 lb 2.7 oz)   SpO2 96%   BMI 33.70 kg/m²     Physical Exam   Constitutional: He is oriented to person, place, and time. He appears well-developed and well-nourished. No distress.   HENT:   Head: Normocephalic and atraumatic.   Mouth/Throat: Oropharynx is clear and moist.   Eyes: Pupils are equal, round, and reactive to light. EOM are normal.   Neck: Neck supple. No thyromegaly present.   Cardiovascular: Normal rate and regular rhythm.   Pulmonary/Chest: Breath sounds normal. He has no wheezes. He has no rales.   Abdominal: Soft. Bowel sounds are normal. There is no tenderness.   Musculoskeletal: He exhibits no edema.   Lymphadenopathy:     He has no cervical adenopathy.   Neurological: He is alert and oriented to person, place, and time.   Skin: Skin is warm and dry.   Psychiatric: He has a normal mood and affect. His behavior is normal.       Assessment:     1. Migraine with aura and without status migrainosus, not intractable    2. Irritable bowel syndrome with diarrhea    3. Chronic kidney disease, unspecified CKD stage    4. Essential hypertension    5. Elevated CK      Plan:   Migraine with aura and without status migrainosus, not intractable  Improved.  Continue Topamax prophylactically and Maxalt as needed  -     topiramate (TOPAMAX) 25 MG tablet; Take 1 tablet (25 mg total) by mouth 2 (two) times daily. One at night for first week.  Dispense: 180 tablet; Refill: 0    Irritable bowel syndrome with diarrhea  Mild worsening.  Advised try FODMAP diet and discuss further with GI  -     Ambulatory Referral to Gastroenterology    Chronic kidney  disease, unspecified CKD stage  Stable.  Has had appointments with Renal.  Renal has some concern about his elevated CK.  He is a muscular gentleman that exercises.  Rheumatology referral has been placed  Essential hypertension  Controlled.  Continue current medications and monitoring  Elevated CK  Mild.  Awaiting rheumatology appointment      Lab Frequency Next Occurrence   Renal function panel Once 08/26/2019   Protein / creatinine ratio, urine Once 08/26/2019   Urinalysis Once 08/26/2019   Urinalysis Once 08/26/2019   Basic metabolic panel Once 08/27/2019       Problem List Items Addressed This Visit        Neuro    Migraine with aura and without status migrainosus, not intractable - Primary    Relevant Medications    topiramate (TOPAMAX) 25 MG tablet       Cardiac/Vascular    HTN (hypertension)       Renal/    Chronic kidney disease       GI    Irritable bowel syndrome with diarrhea    Relevant Orders    Ambulatory Referral to Gastroenterology      Other Visit Diagnoses     Elevated CK              Follow up in about 6 months (around 2/29/2020), or if symptoms worsen or fail to improve.

## 2019-09-05 ENCOUNTER — OFFICE VISIT (OUTPATIENT)
Dept: GASTROENTEROLOGY | Facility: CLINIC | Age: 44
End: 2019-09-05
Payer: OTHER GOVERNMENT

## 2019-09-05 VITALS
BODY MASS INDEX: 34.84 KG/M2 | WEIGHT: 222 LBS | DIASTOLIC BLOOD PRESSURE: 78 MMHG | HEART RATE: 57 BPM | SYSTOLIC BLOOD PRESSURE: 100 MMHG | HEIGHT: 67 IN

## 2019-09-05 DIAGNOSIS — R19.7 DIARRHEA, UNSPECIFIED TYPE: ICD-10-CM

## 2019-09-05 DIAGNOSIS — R10.9 ABDOMINAL PAIN, UNSPECIFIED ABDOMINAL LOCATION: Primary | ICD-10-CM

## 2019-09-05 PROCEDURE — 99204 PR OFFICE/OUTPT VISIT, NEW, LEVL IV, 45-59 MIN: ICD-10-PCS | Mod: S$PBB,,, | Performed by: NURSE PRACTITIONER

## 2019-09-05 PROCEDURE — 99213 OFFICE O/P EST LOW 20 MIN: CPT | Mod: PBBFAC | Performed by: NURSE PRACTITIONER

## 2019-09-05 PROCEDURE — 99999 PR PBB SHADOW E&M-EST. PATIENT-LVL III: CPT | Mod: PBBFAC,,, | Performed by: NURSE PRACTITIONER

## 2019-09-05 PROCEDURE — 99204 OFFICE O/P NEW MOD 45 MIN: CPT | Mod: S$PBB,,, | Performed by: NURSE PRACTITIONER

## 2019-09-05 PROCEDURE — 99999 PR PBB SHADOW E&M-EST. PATIENT-LVL III: ICD-10-PCS | Mod: PBBFAC,,, | Performed by: NURSE PRACTITIONER

## 2019-09-05 RX ORDER — DICYCLOMINE HYDROCHLORIDE 20 MG/1
20 TABLET ORAL 3 TIMES DAILY PRN
Qty: 90 TABLET | Refills: 0 | Status: SHIPPED | OUTPATIENT
Start: 2019-09-05 | End: 2021-06-14 | Stop reason: SDUPTHER

## 2019-09-05 NOTE — PROGRESS NOTES
Clinic Consult:  [facility] Consultation Note    Reason for Consult:  The primary encounter diagnosis was Abdominal pain, unspecified abdominal location. A diagnosis of Diarrhea, unspecified type was also pertinent to this visit.    PCP: Tommie Baltazar   [address] [facility] / [place] [address]    HPI:  This is a 44 y.o. male here for evaluation of the above. He presents to clinic with chronic GI complaints of abdominal cramping and diarrhea. Onset of symptoms started years ago. Symptoms occur shortly after eating. He reports having abdominal cramping followed by urgent bowel movement that is loose to watery in consistency. He has about 2 BM per day. No hematochezia or melena. No family history of colon cancer or IBD. He has never been on any treatment in the past.     Review of Systems   Constitutional: Negative for fever, malaise/fatigue and weight loss.   HENT: Negative for sore throat.    Respiratory: Negative for cough and wheezing.    Cardiovascular: Negative for chest pain and palpitations.   Gastrointestinal: Positive for abdominal pain and diarrhea. Negative for blood in stool, constipation, heartburn, melena, nausea and vomiting.   Genitourinary: Negative for dysuria and frequency.   Musculoskeletal: Negative for back pain, joint pain, myalgias and neck pain.   Skin: Negative for itching and rash.   Neurological: Negative for dizziness, speech change, seizures, loss of consciousness and headaches.   Psychiatric/Behavioral: Negative for depression and substance abuse. The patient is not nervous/anxious.        Medical History:  has a past medical history of Acid reflux, Gout, Hypertension, Kidney problem, PTSD (post-traumatic stress disorder), and Sleep apnea.    Surgical History:  has a past surgical history that includes Shoulder surgery (Left, 2015) and Renal biopsy (Right, 2015).    Family History: family history is not on file..     Social History:  reports that he has never smoked. He  "has never used smokeless tobacco. He reports that he drinks alcohol. He reports that he does not use drugs.    Allergies: Reviewed    Home Medications:   Current Outpatient Medications on File Prior to Visit   Medication Sig Dispense Refill    allopurinol (ZYLOPRIM) 300 MG tablet Take 1/2 tablet by mouth once daily. 45 tablet 1    amLODIPine (NORVASC) 2.5 MG tablet Take 1 tablet (2.5 mg total) by mouth once daily. 90 tablet 1    losartan (COZAAR) 25 MG tablet Take 1 tablet (25 mg total) by mouth once daily. 90 tablet 3    MULTIVITAMIN ORAL Take 1 tablet by mouth once daily.      prazosin (MINIPRESS) 2 MG Cap Take 1 capsule (2 mg total) by mouth every evening. 90 capsule 3    predniSONE (DELTASONE) 10 MG tablet Take 20 mg by mouth once daily.      sertraline (ZOLOFT) 100 MG tablet Take 1 tablet (100 mg total) by mouth once daily. 90 tablet 3    sildenafil (VIAGRA) 100 MG tablet Take 1 tablet (100 mg total) by mouth daily as needed for Erectile Dysfunction. 12 tablet 0    topiramate (TOPAMAX) 25 MG tablet Take 1 tablet (25 mg total) by mouth 2 (two) times daily. take one tablet at night for first week. 180 tablet 0    traZODone (DESYREL) 150 MG tablet Take 1 tablet (150 mg total) by mouth every evening. 90 tablet 2    valACYclovir (VALTREX) 500 MG tablet Take 1 tablet (500 mg total) by mouth 2 (two) times daily as needed. 30 tablet 0    rizatriptan (MAXALT-MLT) 10 MG disintegrating tablet Take 1 tablet (10 mg total) by mouth daily as needed for Migraine. May repeat in 2 hours if needed 30 tablet 0     No current facility-administered medications on file prior to visit.        Physical Exam:  /78   Pulse (!) 57   Ht 5' 7" (1.702 m)   Wt 100.7 kg (222 lb 0.1 oz)   BMI 34.77 kg/m²   Body mass index is 34.77 kg/m².  Physical Exam   Constitutional: He is oriented to person, place, and time and well-developed, well-nourished, and in no distress. No distress.   HENT:   Head: Normocephalic.   Eyes: " Pupils are equal, round, and reactive to light. Conjunctivae are normal.   Cardiovascular: Normal rate, regular rhythm and normal heart sounds.   Pulmonary/Chest: Effort normal and breath sounds normal. No respiratory distress.   Abdominal: Soft. Bowel sounds are normal. He exhibits no distension. There is no tenderness.   Neurological: He is alert and oriented to person, place, and time. No cranial nerve deficit.   Skin: Skin is warm and dry. No rash noted.   Psychiatric: Mood and affect normal.       Labs: Pertinent labs reviewed.    Assessment:  1. Abdominal pain, unspecified abdominal location    2. Diarrhea, unspecified type         Recommendations:  - symptoms are likely secondary to IBS but will check colonoscopy for further evaluation as he is also due for age related colon cancer screening as well.  No prior colonoscopy.   - start Bentyl as needed, probiotics, and metamucil.   -     Case request GI: COLONOSCOPY  -     dicyclomine (BENTYL) 20 mg tablet; Take 1 tablet (20 mg total) by mouth 3 (three) times daily as needed (abdominal pain).  Dispense: 90 tablet; Refill: 0    Follow up to be determined after procedure.    Thank you so much for allowing me to participate in the care of JAVIER Bravo

## 2019-09-05 NOTE — LETTER
September 5, 2019      Tommie Baltazar MD  [address] [place] [address]           [facility]  [address] [facility] [address]  Phone: [phone]  Fax: [phone]          Patient: Elder Hernandez   MR Number: [id number]   YOB: 1975   Date of Visit: 9/5/2019       Dear Dr. Tommie Baltazar:    Thank you for referring Elder Hernandez to me for evaluation. Attached you will find relevant portions of my assessment and plan of care.    If you have questions, please do not hesitate to call me. I look forward to following Elder Hernandez along with you.    Sincerely,    Amy Avitia, DREA    Enclosure  CC:  No Recipients    If you would like to receive this communication electronically, please contact [email or web address] or [phone] to request more information on [employer] Link access.    For providers and/or their staff who would like to refer a patient to [facility], please contact us through our one-stop-shop provider referral line, [facility] , at [phone].    If you feel you have received this communication in error or would no longer like to receive these types of communications, please e-mail [email or web address]

## 2019-09-11 NOTE — PROGRESS NOTES
Digital Medicine Program Enrollment  HPI     In an attempt to complete enrollment pt requested a callback in an hour

## 2019-09-11 NOTE — PROGRESS NOTES
Digital Medicine Program Enrollment  HPI     Called pt back within the hour as requested and pt was still unavailable. Pt will callback at his convenience

## 2019-09-23 ENCOUNTER — TELEPHONE (OUTPATIENT)
Dept: ENDOSCOPY | Facility: HOSPITAL | Age: 44
End: 2019-09-23

## 2019-09-23 NOTE — TELEPHONE ENCOUNTER
Spoke with patient.  Informed of Miril[employer] Updated prep solution.  New instructions mailed out to patient.  He verbalized understanding and verified address.

## 2019-10-07 PROBLEM — R10.30 LOWER ABDOMINAL PAIN: Status: ACTIVE | Noted: 2019-10-07

## 2019-10-07 PROBLEM — R19.7 DIARRHEA: Status: ACTIVE | Noted: 2019-10-07

## 2019-10-08 ENCOUNTER — HOSPITAL ENCOUNTER (OUTPATIENT)
Facility: HOSPITAL | Age: 44
Discharge: HOME | End: 2019-10-08
Attending: FAMILY MEDICINE | Admitting: FAMILY MEDICINE
Payer: OTHER GOVERNMENT

## 2019-10-08 ENCOUNTER — ANESTHESIA (OUTPATIENT)
Dept: ENDOSCOPY | Facility: HOSPITAL | Age: 44
End: 2019-10-08
Payer: OTHER GOVERNMENT

## 2019-10-08 ENCOUNTER — ANESTHESIA EVENT (OUTPATIENT)
Dept: ENDOSCOPY | Facility: HOSPITAL | Age: 44
End: 2019-10-08
Payer: OTHER GOVERNMENT

## 2019-10-08 VITALS
TEMPERATURE: 98 F | HEIGHT: 67 IN | OXYGEN SATURATION: 99 % | HEART RATE: 56 BPM | RESPIRATION RATE: 20 BRPM | WEIGHT: 213.38 LBS | BODY MASS INDEX: 33.49 KG/M2 | DIASTOLIC BLOOD PRESSURE: 76 MMHG | SYSTOLIC BLOOD PRESSURE: 127 MMHG

## 2019-10-08 DIAGNOSIS — K63.5 POLYP OF COLON, UNSPECIFIED PART OF COLON, UNSPECIFIED TYPE: ICD-10-CM

## 2019-10-08 DIAGNOSIS — R19.7 DIARRHEA, UNSPECIFIED TYPE: ICD-10-CM

## 2019-10-08 DIAGNOSIS — R10.9 ABDOMINAL PAIN: ICD-10-CM

## 2019-10-08 DIAGNOSIS — R10.30 LOWER ABDOMINAL PAIN: Primary | ICD-10-CM

## 2019-10-08 PROCEDURE — 00811 ANES LWR INTST NDSC NOS: CPT | Performed by: FAMILY MEDICINE

## 2019-10-08 PROCEDURE — 63600175 PHARM REV CODE 636 W HCPCS: Performed by: FAMILY MEDICINE

## 2019-10-08 PROCEDURE — 88305 TISSUE SPECIMEN TO PATHOLOGY - SURGERY: ICD-10-PCS | Mod: 26,,, | Performed by: PATHOLOGY

## 2019-10-08 PROCEDURE — 63600175 PHARM REV CODE 636 W HCPCS: Performed by: NURSE ANESTHETIST, CERTIFIED REGISTERED

## 2019-10-08 PROCEDURE — 45380 COLONOSCOPY AND BIOPSY: CPT | Mod: ,,, | Performed by: FAMILY MEDICINE

## 2019-10-08 PROCEDURE — 27201012 HC FORCEPS, HOT/COLD, DISP: Performed by: FAMILY MEDICINE

## 2019-10-08 PROCEDURE — 37000008 HC ANESTHESIA 1ST 15 MINUTES: Performed by: FAMILY MEDICINE

## 2019-10-08 PROCEDURE — 45380 PR COLONOSCOPY,BIOPSY: ICD-10-PCS | Mod: ,,, | Performed by: FAMILY MEDICINE

## 2019-10-08 PROCEDURE — 45380 COLONOSCOPY AND BIOPSY: CPT | Performed by: FAMILY MEDICINE

## 2019-10-08 PROCEDURE — 88305 TISSUE EXAM BY PATHOLOGIST: CPT | Mod: 26,,, | Performed by: PATHOLOGY

## 2019-10-08 PROCEDURE — 88305 TISSUE EXAM BY PATHOLOGIST: CPT | Performed by: PATHOLOGY

## 2019-10-08 PROCEDURE — 37000009 HC ANESTHESIA EA ADD 15 MINS: Performed by: FAMILY MEDICINE

## 2019-10-08 RX ORDER — SODIUM CHLORIDE 0.9 % (FLUSH) 0.9 %
10 SYRINGE (ML) INJECTION
Status: DISCONTINUED | OUTPATIENT
Start: 2019-10-08 | End: 2019-10-08 | Stop reason: HOSPADM

## 2019-10-08 RX ORDER — PROPOFOL 10 MG/ML
INJECTION, EMULSION INTRAVENOUS
Status: DISCONTINUED | OUTPATIENT
Start: 2019-10-08 | End: 2019-10-08

## 2019-10-08 RX ORDER — SODIUM CHLORIDE, SODIUM LACTATE, POTASSIUM CHLORIDE, CALCIUM CHLORIDE 600; 310; 30; 20 MG/100ML; MG/100ML; MG/100ML; MG/100ML
INJECTION, SOLUTION INTRAVENOUS CONTINUOUS
Status: DISCONTINUED | OUTPATIENT
Start: 2019-10-09 | End: 2019-10-08 | Stop reason: HOSPADM

## 2019-10-08 RX ADMIN — PROPOFOL 30 MG: 10 INJECTION, EMULSION INTRAVENOUS at 01:10

## 2019-10-08 RX ADMIN — PROPOFOL 50 MG: 10 INJECTION, EMULSION INTRAVENOUS at 01:10

## 2019-10-08 RX ADMIN — PROPOFOL 40 MG: 10 INJECTION, EMULSION INTRAVENOUS at 01:10

## 2019-10-08 RX ADMIN — SODIUM CHLORIDE, SODIUM LACTATE, POTASSIUM CHLORIDE, AND CALCIUM CHLORIDE: 600; 310; 30; 20 INJECTION, SOLUTION INTRAVENOUS at 01:10

## 2019-10-08 NOTE — TRANSFER OF CARE
"Anesthesia Transfer of Care Note    Patient: Elder Hernandez    Procedure(s) Performed: Procedure(s) (LRB):  COLONOSCOPY (N/A)    Patient location: GI    Anesthesia Type: MAC    Transport from OR: Transported from OR on room air with adequate spontaneous ventilation    Post pain: adequate analgesia    Post assessment: no apparent anesthetic complications    Post vital signs: stable    Level of consciousness: responds to stimulation    Nausea/Vomiting: no nausea/vomiting    Complications: none    Transfer of care protocol was followed      Last vitals:   Visit Vitals  BP (!) 141/95 (BP Location: Left arm, Patient Position: Lying)   Pulse (!) 55   Temp 36.6 °C (97.9 °F) (Skin)   Resp 18   Ht 5' 7" (1.702 m)   Wt 96.8 kg (213 lb 6.5 oz)   SpO2 96%   BMI 33.42 kg/m²     "

## 2019-10-08 NOTE — DISCHARGE SUMMARY
Endoscopy Discharge Summary      Admit Date: 10/8/2019    Discharge Date and Time:  10/8/2019 1:34 PM    Attending Physician: Niraj Grey MD     Discharge Physician: Niraj Grey MD     Principal Admitting Diagnoses: Lower abdominal pain         Discharge Diagnosis: The primary encounter diagnosis was Lower abdominal pain. Diagnoses of Diarrhea, unspecified type, Abdominal pain, and Polyp of colon, unspecified part of colon, unspecified type were also pertinent to this visit.     Discharged Condition: Good    Indication for Admission: Lower abdominal pain     Hospital Course: Patient was admitted for an inpatient procedure and tolerated the procedure well with no complications.    Significant Diagnostic Studies: Colonoscopy with cold biopsy and Colonoscopy with cold biopsy polypectomy    Pathology (if any):  Specimen (12h ago, onward)     Start     Ordered    10/08/19 1323  Specimen to Pathology - Surgery  Once     Comments:  1. Cecal polyp2. Random colon biopsies,  r/o microscopic colitis      10/08/19 1329                Estimated Blood Loss: 1 ml.    Discussed with: patient and family.    Disposition: Home.    Follow Up/Patient Instructions:   Current Discharge Medication List      CONTINUE these medications which have NOT CHANGED    Details   allopurinol (ZYLOPRIM) 300 MG tablet Take 1/2 tablet by mouth once daily.  Qty: 45 tablet, Refills: 1    Associated Diagnoses: Gout, unspecified cause, unspecified chronicity, unspecified site      amLODIPine (NORVASC) 2.5 MG tablet Take 1 tablet (2.5 mg total) by mouth once daily.  Qty: 90 tablet, Refills: 1    Associated Diagnoses: Essential hypertension      dicyclomine (BENTYL) 20 mg tablet Take 1 tablet (20 mg total) by mouth 3 (three) times daily as needed (abdominal pain).  Qty: 90 tablet, Refills: 0      losartan (COZAAR) 25 MG tablet Take 1 tablet (25 mg total) by mouth once daily.  Qty: 90 tablet, Refills: 3    Associated Diagnoses: Essential  hypertension      MULTIVITAMIN ORAL Take 1 tablet by mouth once daily.      prazosin (MINIPRESS) 2 MG Cap Take 1 capsule (2 mg total) by mouth every evening.  Qty: 90 capsule, Refills: 3    Associated Diagnoses: Benign prostatic hyperplasia without lower urinary tract symptoms      rizatriptan (MAXALT-MLT) 10 MG disintegrating tablet Take 1 tablet (10 mg total) by mouth daily as needed for Migraine. May repeat in 2 hours if needed  Qty: 30 tablet, Refills: 0    Associated Diagnoses: Migraine with aura and without status migrainosus, not intractable      sertraline (ZOLOFT) 100 MG tablet Take 1 tablet (100 mg total) by mouth once daily.  Qty: 90 tablet, Refills: 3      sildenafil (VIAGRA) 100 MG tablet Take 1 tablet (100 mg total) by mouth daily as needed for Erectile Dysfunction.  Qty: 12 tablet, Refills: 0    Associated Diagnoses: Erectile dysfunction, unspecified erectile dysfunction type      topiramate (TOPAMAX) 25 MG tablet Take 1 tablet (25 mg total) by mouth 2 (two) times daily. take one tablet at night for first week.  Qty: 180 tablet, Refills: 0    Associated Diagnoses: Migraine with aura and without status migrainosus, not intractable      traZODone (DESYREL) 150 MG tablet Take 1 tablet (150 mg total) by mouth every evening.  Qty: 90 tablet, Refills: 2    Associated Diagnoses: PTSD (post-traumatic stress disorder)      predniSONE (DELTASONE) 10 MG tablet Take 20 mg by mouth once daily.      valACYclovir (VALTREX) 500 MG tablet Take 1 tablet (500 mg total) by mouth 2 (two) times daily as needed.  Qty: 30 tablet, Refills: 0    Associated Diagnoses: HSV-2 infection             Discharge Procedure Orders   Diet general     Call MD for:  temperature >100.4     Call MD for:  persistent nausea and vomiting     Call MD for:  severe uncontrolled pain     Call MD for:  difficulty breathing, headache or visual disturbances     Call MD for:  redness, tenderness, or signs of infection (pain, swelling, redness, odor or  green/yellow discharge around incision site)     Call MD for:  hives     Call MD for:  persistent dizziness or light-headedness     No dressing needed       Follow-up Information     Niraj Grey MD. Call in 2 weeks.    Specialty:  Family Medicine  Why:  To receive pathology results.  Contact information:  [address] [facility] [place] [address] [phone]

## 2019-10-08 NOTE — [employer] PATIENT INSTRUCTIONS
Discharge Summary/Instructions after an Endoscopic Procedure  Patient Name: Elder Hernandez  Patient MRN: [id number]  Patient YOB: 1975  Tuesday, October 08, 2019 Niraj Grey MD  RESTRICTIONS:  During your procedure today, you received medications for sedation.  These   medications may affect your judgment, balance and coordination.  Therefore,   for 24 hours, you have the following restrictions:   - DO NOT drive a car, operate machinery, make legal/financial decisions,   sign important papers or drink alcohol.    ACTIVITY:  Today: no heavy lifting, straining or running due to procedural   sedation/anesthesia.  The following day: return to full activity including work.  DIET:  Eat and drink normally unless instructed otherwise.     TREATMENT FOR COMMON SIDE EFFECTS:  - Mild abdominal pain, nausea, belching, bloating or excessive gas:  rest,   eat lightly and use a heating pad.  - Sore Throat: treat with throat lozenges and/or gargle with warm salt   water.  - Because air was used during the procedure, expelling large amounts of air   from your rectum or belching is normal.  - If a bowel prep was taken, you may not have a bowel movement for 1-3 days.    This is normal.  SYMPTOMS TO WATCH FOR AND REPORT TO YOUR PHYSICIAN:  1. Abdominal pain or bloating, other than gas cramps.  2. Chest pain.  3. Back pain.  4. Signs of infection such as: chills or fever occurring within 24 hours   after the procedure.  5. Rectal bleeding, which would show as bright red, maroon, or black stools.   (A tablespoon of blood from the rectum is not serious, especially if   hemorrhoids are present.)  6. Vomiting.  7. Weakness or dizziness.  GO DIRECTLY TO THE NEAREST EMERGENCY ROOM IF YOU HAVE ANY OF THE FOLLOWING:      Difficulty breathing              Chills and/or fever over 101 F   Persistent vomiting and/or vomiting blood   Severe abdominal pain   Severe chest pain   Black, tarry stools   Bleeding- more than one  tablespoon   Any other symptom or condition that you feel may need urgent attention  Your doctor recommends these additional instructions:  If any biopsies were taken, your doctors clinic will contact you in 1 to 2   weeks with any results.  - Patient has a contact number available for emergencies.  The signs and   symptoms of potential delayed complications were discussed with the   patient.  Return to normal activities tomorrow.  Written discharge   instructions were provided to the patient.   - High fiber diet.   - Continue present medications.   - Await pathology results.   - Repeat colonoscopy in 5 years for surveillance.   - Telephone my office for pathology results in 2 weeks.   - Discharge patient to home (via wheelchair).  For questions, problems or results please call your physician Niraj Grey MD at Work:  [phone]  If you have any questions about the above instructions, call the GI   department at [phone] or call the endoscopy unit at [phone]   from 7am until 3 pm.  [facility], EMERGENCY ROOM PHONE NUMBER:   [phone]  IF A COMPLICATION OR EMERGENCY SITUATION ARISES AND YOU ARE UNABLE TO REACH   YOUR PHYSICIAN - GO DIRECTLY TO THE EMERGENCY ROOM.  I have read or have had read to me these discharge instructions for my   procedure and have received a written copy.  I understand these   instructions and will follow-up with my physician if I have any questions.     __________________________________       _____________________________________  Nurse Signature                                          Patient/Designated   Responsible Party Signature  Niraj Grey MD  10/8/2019 1:32:38 PM  This report has been verified and signed electronically.  [employer]

## 2019-10-08 NOTE — ANESTHESIA RELEASE NOTE
"Anesthesia Release from PACU Note    Patient: Elder Hernandez    Procedure(s) Performed: Procedure(s) (LRB):  COLONOSCOPY (N/A)    Anesthesia type: MAC    Post pain: Adequate analgesia    Post assessment: no apparent anesthetic complications    Last Vitals:   Visit Vitals  BP (!) 141/95 (BP Location: Left arm, Patient Position: Lying)   Pulse (!) 55   Temp 36.6 °C (97.9 °F) (Skin)   Resp 18   Ht 5' 7" (1.702 m)   Wt 96.8 kg (213 lb 6.5 oz)   SpO2 96%   BMI 33.42 kg/m²       Post vital signs: stable    Level of consciousness: awake    Nausea/Vomiting: no nausea/no vomiting    Complications: none    Airway Patency: patent    Respiratory: unassisted    Cardiovascular: stable and blood pressure at baseline    Hydration: euvolemic  "

## 2019-10-08 NOTE — H&P
Short Stay Endoscopy History and Physical    PCP - Tommie Baltazar MD    Procedure - Colonoscopy  ASA - 2  Mallampati - per anesthesia  History of Anesthesia problems - no  Family history Anesthesia problems -  no     HPI:  This is a 44 y.o. male here for evaluation of :   Active Hospital Problems    Diagnosis  POA    *Lower abdominal pain [R10.30]  Yes    Abdominal pain [R10.9]  Yes    Diarrhea [R19.7]  Yes      Resolved Hospital Problems   No resolved problems to display.         Health Maintenance       Date Due Completion Date    TETANUS VACCINE 03/29/1991 ---    Pneumococcal Vaccine (Highest Risk) (1 of 3 - PCV13) 03/29/1992 ---    Influenza Vaccine (1) 09/01/2019 4/22/2019    Lipid Panel 04/24/2024 4/24/2019          Screening - no  History of polyps - no  Diarrhea - no  Anemia - no  Blood in stools - no  Abdominal pain - no  Other - no    ROS:  CONSTITUTIONAL: Denies weight change,  fatigue, fevers, chills, night sweats.  CARDIOVASCULAR: Denies chest pain, shortness of breath, orthopnea and edema.  RESPIRATORY: Denies cough, hemoptysis, dyspnea, and wheezing.  GI: See HPI.    Medical History:   Past Medical History:   Diagnosis Date    Acid reflux     Gout     Hypertension     Kidney problem     PTSD (post-traumatic stress disorder)     Sleep apnea        Surgical History:   Past Surgical History:   Procedure Laterality Date    RENAL BIOPSY Right 2015    SHOULDER SURGERY Left 2015       Family History:   History reviewed. No pertinent family history.    Social History:   Social History     Tobacco Use    Smoking status: Never Smoker    Smokeless tobacco: Never Used   Substance Use Topics    Alcohol use: Yes     Comment: socially    Drug use: Never       Allergies:   Review of patient's allergies indicates:  No Known Allergies    Medications:   No current facility-administered medications on file prior to encounter.      Current Outpatient Medications on File Prior to Encounter   Medication Sig  Dispense Refill    allopurinol (ZYLOPRIM) 300 MG tablet Take 1/2 tablet by mouth once daily. 45 tablet 1    amLODIPine (NORVASC) 2.5 MG tablet Take 1 tablet (2.5 mg total) by mouth once daily. 90 tablet 1    dicyclomine (BENTYL) 20 mg tablet Take 1 tablet (20 mg total) by mouth 3 (three) times daily as needed (abdominal pain). 90 tablet 0    losartan (COZAAR) 25 MG tablet Take 1 tablet (25 mg total) by mouth once daily. 90 tablet 3    MULTIVITAMIN ORAL Take 1 tablet by mouth once daily.      prazosin (MINIPRESS) 2 MG Cap Take 1 capsule (2 mg total) by mouth every evening. 90 capsule 3    rizatriptan (MAXALT-MLT) 10 MG disintegrating tablet Take 1 tablet (10 mg total) by mouth daily as needed for Migraine. May repeat in 2 hours if needed 30 tablet 0    sertraline (ZOLOFT) 100 MG tablet Take 1 tablet (100 mg total) by mouth once daily. 90 tablet 3    sildenafil (VIAGRA) 100 MG tablet Take 1 tablet (100 mg total) by mouth daily as needed for Erectile Dysfunction. 12 tablet 0    topiramate (TOPAMAX) 25 MG tablet Take 1 tablet (25 mg total) by mouth 2 (two) times daily. take one tablet at night for first week. 180 tablet 0    traZODone (DESYREL) 150 MG tablet Take 1 tablet (150 mg total) by mouth every evening. 90 tablet 2    predniSONE (DELTASONE) 10 MG tablet Take 20 mg by mouth once daily.      valACYclovir (VALTREX) 500 MG tablet Take 1 tablet (500 mg total) by mouth 2 (two) times daily as needed. 30 tablet 0       Physical Exam:  Vital Signs:   Vitals:    10/08/19 1224   BP: (!) 141/95   Pulse: (!) 55   Resp: 18   Temp: 97.9 °F (36.6 °C)     General Appearance: Well appearing in no acute distress  ENT: OP clear  Chest: CTA B  CV: RRR, no m/r/g  Abd: s/nt/nd/nabs  Ext: no edema    Labs:Reviewed    IMP:  Active Hospital Problems    Diagnosis  POA    *Lower abdominal pain [R10.30]  Yes    Abdominal pain [R10.9]  Yes    Diarrhea [R19.7]  Yes      Resolved Hospital Problems   No resolved problems to  display.         Plan:   I have explained the risks and benefits of colonoscopy to the patient including but not limited to bleeding, perforation, infection, and death. The patient wishes to proceed.

## 2019-10-08 NOTE — DISCHARGE INSTRUCTIONS
Diverticulosis    Diverticulosis means that small pouches have formed in the wall of your large intestine (colon). Most often, this problem causes no symptoms and is common as people age. But the pouches in the colon are at risk of becoming infected. When this happens, the condition is called diverticulitis. Although most people with diverticulosis never develop diverticulitis, it is still not uncommon. Rectal bleeding can also occur and in less common situations, a type of colon inflammation called colitis.  While most people do not have symptoms, some people with diverticulosis may have:  · Abdominal cramps and pain  · Bloating  · Constipation  · Change in bowel habits  Causes  The exact cause of diverticulosis (and diverticulitis) has not been proved, but a few things are associated with the condition:  · Low-fiber diet  · Constipation  · Lack of exercise  Your healthcare provider will talk with you about how to manage your condition. Diet changes may be all that are needed to help control diverticulosis and prevent progression to diverticulitis. If you develop diverticulitis, you will likely need other treatments.  Home care  You may be told to take fiber supplements daily. Fiber adds bulk to the stool so that it passes through the colon more easily. Stool softeners may be recommended. You may also be given medications for pain relief. Be sure to take all medications as directed.  In the past, people were told to avoid corn, nuts, and seeds. This is no longer necessary.  Follow these guidelines when caring for yourself at home:  · Eat unprocessed foods that are high in fiber. Whole grains, fruits, and vegetables are good choices.  · Drink 6 to 8 glasses of water every day unless your healthcare provider has you limit how much fluid you should have.  · Watch for changes in your bowel movements. Tell your provider if you notice any changes.  · Begin an exercise program. Ask your provider how to get started.  Generally, walking is the best.  · Get plenty of rest and sleep.  Follow-up care  Follow up with your healthcare provider, or as advised. Regular visits may be needed to check on your health. Sometimes special procedures such as colonoscopy, are needed after an episode of diverticulitis or blooding. Be sure to keep all your appointments.  If a stool sample was taken, or cultures were done, you should be told if they are positive, or if your treatment needs to be changed. You can call as directed for the results.  If X-rays were done, a radiologist will look at them. You will be told if there is a change in your treatment.  If antibiotics were prescribed, be sure to finish them all.  When to seek medical advice  Call your healthcare provider right away if any of these occur:  · Fever of 100.4°F (38°C) or higher, or as directed by your healthcare provider  · Severe cramps in the lower left side of the abdomen or pain that is getting worse  · Tenderness in the lower left side of the abdomen or worsening pain throughout the abdomen  · Diarrhea or constipation that doesn't get better within 24 hours  · Nausea and vomiting  · Bleeding from the rectum  Call 911  Call emergency services if any of the following occur:  · Trouble breathing  · Confusion  · Very drowsy or trouble awakening  · Fainting or loss of consciousness  · Rapid heart rate  · Chest pain  Date Last Reviewed: 12/30/2015 © 2000-2017 [employer]. [address] [place] [address]. All rights reserved. This information is not intended as a substitute for professional medical care. Always follow your healthcare professional's instructions.        Understanding Colon and Rectal Polyps    The colon (also called the large intestine) is a muscular tube that forms the last part of the digestive tract. It absorbs water and stores food waste. The colon is about 4 to 6 feet long. The rectum is the last 6 inches of the colon. The colon and rectum  have a smooth lining composed of millions of cells. Changes in these cells can lead to growths in the colon that can become cancerous and should be removed. Multiple tests are available to screen for colon cancer, but the colonoscopy is the most recommended test. During colonoscopy, these polyps can be removed. How often you need this test depends on many things including your condition, your family history, symptoms, and what the findings were at the previous colonoscopy.   When the colon lining changes  Changes that happen in the cells that line the colon or rectum can lead to growths called polyps. Over a period of years, polyps can turn cancerous. Removing polyps early may prevent cancer from ever forming.  Polyps  Polyps are fleshy clumps of tissue that form on the lining of the colon or rectum. Small polyps are usually benign (not cancerous). However, over time, cells in a polyp can change and become cancerous. Certain types of polyps known as adenomatous polyps are premalignant. The risk for invasive cancer increases with the size of the polyp and certain cell and gene features. This means that they can become cancerous if they're not removed. Hyperplastic polyps are benign. They can grow quite large and not turn cancerous.   Cancer  Almost all colorectal cancers start when polyp cells begin growing abnormally. As a cancerous tumor grows, it may involve more and more of the colon or rectum. In time, cancer can also grow beyond the colon or rectum and spread to nearby organs or to glands called lymph nodes. The cells can also travel to other parts of the body. This is known as metastasis. The earlier a cancerous tumor is removed, the better the chance of preventing its spread.    Date Last Reviewed: 8/1/2016  © 4152-1331 [employer]. [address] [place] [address], [place] [address]. All rights reserved. This information is not intended as a substitute for professional medical care. Always follow your  healthcare professional's instructions.        Colonoscopy     A camera attached to a flexible tube with a viewing lens is used to take video pictures.     Colonoscopy is a test to view the inside of your lower digestive tract (colon and rectum). Sometimes it can show the last part of the small intestine (ileum). During the test, small pieces of tissue may be removed for testing. This is called a biopsy. Small growths, such as polyps, may also be removed.   Why is colonoscopy done?  The test is done to help look for colon cancer. And it can help find the source of abdominal pain, bleeding, and changes in bowel habits. It may be needed once a year, depending on factors such as your:  · Age  · Health history  · Family health history  · Symptoms  · Results from any prior colonoscopy  Risks and possible complications  These include:  · Bleeding               · A puncture or tear in the colon   · Risks of anesthesia  · A cancer lesion not being seen  Getting ready   To prepare for the test:  · Talk with your healthcare provider about the risks of the test (see below). Also ask your healthcare provider about alternatives to the test.  · Tell your healthcare provider about any medicines you take. Also tell him or her about any health conditions you may have.  · Make sure your rectum and colon are empty for the test. Follow the diet and bowel prep instructions exactly. If you dont, the test may need to be rescheduled.  · Plan for a friend or family member to drive you home after the test.     Colonoscopy provides an inside view of the entire colon.     You may discuss the results with your doctor right away or at a future visit.  During the test   The test is usually done in the hospital on an outpatient basis. This means you go home the same day. The procedure takes about 30 minutes. During that time:  · You are given relaxing (sedating) medicine through an IV line. You may be drowsy, or fully asleep.  · The healthcare  provider will first give you a physical exam to check for anal and rectal problems.  · Then the anus is lubricated and the scope inserted.  · If you are awake, you may have a feeling similar to needing to have a bowel movement. You may also feel pressure as air is pumped into the colon. Its OK to pass gas during the procedure.  · Biopsy, polyp removal, or other treatments may be done during the test.  After the test   You may have gas right after the test. It can help to try to pass it to help prevent later bloating. Your healthcare provider may discuss the results with you right away. Or you may need to schedule a follow-up visit to talk about the results. After the test, you can go back to your normal eating and other activities. You may be tired from the sedation and need to rest for a few hours.  Date Last Reviewed: 11/1/2016  © 3147-2706 [employer]. [address] [place] [address], [place] [address]. All rights reserved. This information is not intended as a substitute for professional medical care. Always follow your healthcare professional's instructions.

## 2019-10-08 NOTE — ANESTHESIA PREPROCEDURE EVALUATION
10/08/2019  Elder Hernandez is a 44 y.o., male.    Anesthesia Evaluation    I have reviewed the Patient Summary Reports.    I have reviewed the Nursing Notes.   I have reviewed the Medications.     Review of Systems  Anesthesia Hx:  No problems with previous Anesthesia  Denies Family Hx of Anesthesia complications.   Denies Personal Hx of Anesthesia complications.   Social:  Social Alcohol Use, Non-Smoker    Hematology/Oncology:  Hematology Normal   Oncology Normal     Cardiovascular:   Hypertension Denies MI.   Denies CABG/stent.         Pulmonary:   Denies COPD.  Denies Asthma. Sleep Apnea    Renal/:   Chronic Renal Disease, CRI BPH    Hepatic/GI:   GERD Denies Liver Disease. Denies Hepatitis. ibs   Musculoskeletal:   Arthritis  gout   Neurological:   Denies CVA. Headaches Denies Seizures.    Endocrine:  Endocrine Normal    Dermatological:   hsv2   Psych:   ptsd         Physical Exam  General:  Obesity    Airway/Jaw/Neck:  Airway Findings: Mouth Opening: Normal Tongue: Normal  General Airway Assessment: Adult  Mallampati: II      Dental:  Dental Findings: In tact   Chest/Lungs:  Chest/Lungs Findings: Clear to auscultation, Normal Respiratory Rate     Heart/Vascular:  Heart Findings: Rate: Normal  Rhythm: Regular Rhythm             Anesthesia Plan  Type of Anesthesia, risks & benefits discussed:  Anesthesia Type:  MAC  Patient's Preference:   Intra-op Monitoring Plan: standard ASA monitors  Intra-op Monitoring Plan Comments:   Post Op Pain Control Plan:   Post Op Pain Control Plan Comments:   Induction:   IV  Beta Blocker:  Patient is not currently on a Beta-Blocker (No further documentation required).       Informed Consent: Patient understands risks and agrees with Anesthesia plan.  Questions answered. Anesthesia consent signed with patient.  ASA Score: 2     Day of Surgery Review of History &  Physical: I have interviewed and examined the patient. I have reviewed the patient's H&P dated:  There are no significant changes.  H&P update referred to the surgeon.         Ready For Surgery From Anesthesia Perspective.

## 2019-10-08 NOTE — ANESTHESIA POSTPROCEDURE EVALUATION
Anesthesia Post Evaluation    Patient: Elder Hernandez    Procedure(s) Performed: Procedure(s) (LRB):  COLONOSCOPY (N/A)    Final Anesthesia Type: MAC  Patient location during evaluation: GI PACU  Patient participation: No - Unable to Participate, Sedation  Level of consciousness: responds to stimulation and awake and alert  Post-procedure vital signs: reviewed and stable  Pain management: adequate  Airway patency: patent  PONV status at discharge: No PONV  Anesthetic complications: no      Cardiovascular status: blood pressure returned to baseline  Respiratory status: unassisted and room air  Hydration status: euvolemic  Follow-up not needed.          Vitals Value Taken Time   /95 10/8/2019 12:24 PM   Temp 36.6 °C (97.9 °F) 10/8/2019 12:24 PM   Pulse 55 10/8/2019 12:24 PM   Resp 18 10/8/2019 12:24 PM   SpO2 96 % 10/8/2019 12:24 PM         No case tracking events are documented in the log.      Pain/Deny Score: No data recorded

## 2019-10-18 ENCOUNTER — LAB VISIT (OUTPATIENT)
Dept: LAB | Facility: HOSPITAL | Age: 44
End: 2019-10-18
Attending: INTERNAL MEDICINE
Payer: OTHER GOVERNMENT

## 2019-10-18 ENCOUNTER — OFFICE VISIT (OUTPATIENT)
Dept: RHEUMATOLOGY | Facility: CLINIC | Age: 44
End: 2019-10-18
Payer: OTHER GOVERNMENT

## 2019-10-18 VITALS
HEIGHT: 67 IN | DIASTOLIC BLOOD PRESSURE: 94 MMHG | SYSTOLIC BLOOD PRESSURE: 143 MMHG | BODY MASS INDEX: 34.84 KG/M2 | HEART RATE: 77 BPM | WEIGHT: 222 LBS

## 2019-10-18 DIAGNOSIS — B00.9 HSV-2 INFECTION: ICD-10-CM

## 2019-10-18 DIAGNOSIS — Z71.89 COUNSELING ON HEALTH PROMOTION AND DISEASE PREVENTION: ICD-10-CM

## 2019-10-18 DIAGNOSIS — R74.8 HYPERCKEMIA: Primary | ICD-10-CM

## 2019-10-18 DIAGNOSIS — R74.8 HYPERCKEMIA: ICD-10-CM

## 2019-10-18 LAB
ALBUMIN SERPL BCP-MCNC: 3.9 G/DL (ref 3.5–5.2)
ALP SERPL-CCNC: 85 U/L (ref 55–135)
ALT SERPL W/O P-5'-P-CCNC: 47 U/L (ref 10–44)
ANION GAP SERPL CALC-SCNC: 11 MMOL/L (ref 8–16)
AST SERPL-CCNC: 43 U/L (ref 10–40)
BILIRUB SERPL-MCNC: 0.5 MG/DL (ref 0.1–1)
BUN SERPL-MCNC: 13 MG/DL (ref 6–20)
CALCIUM SERPL-MCNC: 9.4 MG/DL (ref 8.7–10.5)
CHLORIDE SERPL-SCNC: 103 MMOL/L (ref 95–110)
CK SERPL-CCNC: 707 U/L (ref 20–200)
CO2 SERPL-SCNC: 27 MMOL/L (ref 23–29)
CREAT SERPL-MCNC: 1.6 MG/DL (ref 0.5–1.4)
CRP SERPL-MCNC: 3.4 MG/L (ref 0–8.2)
ERYTHROCYTE [SEDIMENTATION RATE] IN BLOOD BY WESTERGREN METHOD: <2 MM/HR (ref 0–23)
EST. GFR  (AFRICAN AMERICAN): 59 ML/MIN/1.73 M^2
EST. GFR  (NON AFRICAN AMERICAN): 51 ML/MIN/1.73 M^2
GLUCOSE SERPL-MCNC: 88 MG/DL (ref 70–110)
LDH SERPL L TO P-CCNC: 189 U/L (ref 110–260)
POTASSIUM SERPL-SCNC: 4.3 MMOL/L (ref 3.5–5.1)
PROT SERPL-MCNC: 8.2 G/DL (ref 6–8.4)
SODIUM SERPL-SCNC: 141 MMOL/L (ref 136–145)

## 2019-10-18 PROCEDURE — 99213 OFFICE O/P EST LOW 20 MIN: CPT | Mod: PBBFAC | Performed by: INTERNAL MEDICINE

## 2019-10-18 PROCEDURE — 86235 NUCLEAR ANTIGEN ANTIBODY: CPT

## 2019-10-18 PROCEDURE — 86140 C-REACTIVE PROTEIN: CPT

## 2019-10-18 PROCEDURE — 99999 PR PBB SHADOW E&M-EST. PATIENT-LVL III: CPT | Mod: PBBFAC,,, | Performed by: INTERNAL MEDICINE

## 2019-10-18 PROCEDURE — 83615 LACTATE (LD) (LDH) ENZYME: CPT

## 2019-10-18 PROCEDURE — 83516 IMMUNOASSAY NONANTIBODY: CPT | Mod: 59

## 2019-10-18 PROCEDURE — 80053 COMPREHEN METABOLIC PANEL: CPT

## 2019-10-18 PROCEDURE — 99244 OFF/OP CNSLTJ NEW/EST MOD 40: CPT | Mod: S$PBB,,, | Performed by: INTERNAL MEDICINE

## 2019-10-18 PROCEDURE — 99999 PR PBB SHADOW E&M-EST. PATIENT-LVL III: ICD-10-PCS | Mod: PBBFAC,,, | Performed by: INTERNAL MEDICINE

## 2019-10-18 PROCEDURE — 82164 ANGIOTENSIN I ENZYME TEST: CPT

## 2019-10-18 PROCEDURE — 85652 RBC SED RATE AUTOMATED: CPT

## 2019-10-18 PROCEDURE — 99244 PR OFFICE CONSULTATION,LEVEL IV: ICD-10-PCS | Mod: S$PBB,,, | Performed by: INTERNAL MEDICINE

## 2019-10-18 PROCEDURE — 82085 ASSAY OF ALDOLASE: CPT

## 2019-10-18 PROCEDURE — 86235 NUCLEAR ANTIGEN ANTIBODY: CPT | Mod: 59

## 2019-10-18 PROCEDURE — 36415 COLL VENOUS BLD VENIPUNCTURE: CPT

## 2019-10-18 PROCEDURE — 86038 ANTINUCLEAR ANTIBODIES: CPT

## 2019-10-18 PROCEDURE — 82550 ASSAY OF CK (CPK): CPT

## 2019-10-18 RX ORDER — VALACYCLOVIR HYDROCHLORIDE 500 MG/1
500 TABLET, FILM COATED ORAL 2 TIMES DAILY PRN
Qty: 30 TABLET | Refills: 0 | Status: SHIPPED | OUTPATIENT
Start: 2019-10-18 | End: 2021-04-28 | Stop reason: SDUPTHER

## 2019-10-18 NOTE — LETTER
October 18, 2019      Tommie Baltazar MD  [address]  [place] [address]           [facility]  [address]  [place] [address]  Phone: [phone]  Fax: [phone]          Patient: Elder Hernandez   MR Number: [id number]   YOB: 1975   Date of Visit: 10/18/2019       Dear Dr. Tommie Baltazar:    Thank you for referring Elder Hernandez to me for evaluation. Attached you will find relevant portions of my assessment and plan of care.    If you have questions, please do not hesitate to call me. I look forward to following Elder Hernandez along with you.    Sincerely,    Mannie Holland MD    Enclosure  CC:  No Recipients    If you would like to receive this communication electronically, please contact [email or web address] or [phone] to request more information on [employer] Link access.    For providers and/or their staff who would like to refer a patient to [facility], please contact us through our one-stop-shop provider referral line, [facility], at [phone].    If you feel you have received this communication in error or would no longer like to receive these types of communications, please e-mail [email or web address]

## 2019-10-18 NOTE — PROGRESS NOTES
RHEUMATOLOGY OUTPATIENT CLINIC NOTE    10/18/2019    Attending Rheumatologist: Mannie Holland  Primary Care Provider: Tommie Baltazar MD   Physician Requesting Consultation: Tommie Baltazar MD  [address] [facility] [address]  [place] [address]  Chief Complaint/Reason For Consultation:  elevated esr    Subjective:       HPI  Elder Hernandez is a 44 y.o. Black or  male with abnormal lab results referred for rheumatic evaluation. No acute complaints today.  Patient was not sure why was seeing a Rheumatologist.  Documented for generalized weakness and fatigue that was interfering with his physical performance.   member with regular physical activity as passes regular routine.  Has had prior episodes of rhabdomyolysis.  Also with past medical history FSGS biopsy-proven, and gout for which was started on allopurinol last December.  Noted to have elevated CK recommended for rheumatic evaluation.    Today  No acute complaints.  When questioned refers generalized weakness.  Unsure of onset.  Has remained stable without worsening.  Denies any recent episodes of red urine.  No particular relation to activity level.  Able to perform activities of daily living otherwise without particular difficulty.  Denies focal weakness.  Does not have any rash, discoloration of fingertips upon cold exposure, ulcers, dysphagia, MURRAY or persistent cough.    Review of Systems   Constitutional: Positive for malaise/fatigue. Negative for chills and fever.   Eyes: Negative for pain and redness.   Respiratory: Negative for cough, hemoptysis and shortness of breath.    Cardiovascular: Negative for chest pain and leg swelling.   Gastrointestinal: Negative for abdominal pain, blood in stool and melena.   Genitourinary: Negative for dysuria and hematuria.   Musculoskeletal: Negative for falls and joint pain.   Skin: Negative for rash.   Neurological: Positive for weakness. Negative for tingling and focal weakness.    Psychiatric/Behavioral: Negative for memory loss. The patient does not have insomnia.      Chronic comorbid conditions affecting medical decision making today:  Past Medical History:   Diagnosis Date    Acid reflux     Gout     Hypertension     Kidney problem     PTSD (post-traumatic stress disorder)     Sleep apnea    · FSGS  · History of rhabdomyolysis    Past Surgical History:   Procedure Laterality Date    COLONOSCOPY N/A 10/8/2019    Procedure: COLONOSCOPY;  Surgeon: Niraj Grey MD;  Location: [facility];  Service: Endoscopy;  Laterality: N/A;    RENAL BIOPSY Right 2015    SHOULDER SURGERY Left 2015     History reviewed. No pertinent family history.  Social History     Substance and Sexual Activity   Alcohol Use Yes    Comment: socially     Social History     Tobacco Use   Smoking Status Never Smoker   Smokeless Tobacco Never Used     Social History     Substance and Sexual Activity   Drug Use Never       Current Outpatient Medications:     allopurinol (ZYLOPRIM) 300 MG tablet, Take 1/2 tablet by mouth once daily., Disp: 45 tablet, Rfl: 1    amLODIPine (NORVASC) 2.5 MG tablet, Take 1 tablet (2.5 mg total) by mouth once daily., Disp: 90 tablet, Rfl: 1    dicyclomine (BENTYL) 20 mg tablet, Take 1 tablet (20 mg total) by mouth 3 (three) times daily as needed (abdominal pain)., Disp: 90 tablet, Rfl: 0    losartan (COZAAR) 25 MG tablet, Take 1 tablet (25 mg total) by mouth once daily., Disp: 90 tablet, Rfl: 3    MULTIVITAMIN ORAL, Take 1 tablet by mouth once daily., Disp: , Rfl:     prazosin (MINIPRESS) 2 MG Cap, Take 1 capsule (2 mg total) by mouth every evening., Disp: 90 capsule, Rfl: 3    predniSONE (DELTASONE) 10 MG tablet, Take 20 mg by mouth once daily., Disp: , Rfl:     sertraline (ZOLOFT) 100 MG tablet, Take 1 tablet (100 mg total) by mouth once daily., Disp: 90 tablet, Rfl: 3    sildenafil (VIAGRA) 100 MG tablet, Take 1 tablet (100 mg total) by mouth daily as needed for Erectile  "Dysfunction., Disp: 12 tablet, Rfl: 0    topiramate (TOPAMAX) 25 MG tablet, Take 1 tablet (25 mg total) by mouth 2 (two) times daily. take one tablet at night for first week., Disp: 180 tablet, Rfl: 0    traZODone (DESYREL) 150 MG tablet, Take 1 tablet (150 mg total) by mouth every evening., Disp: 90 tablet, Rfl: 2    valACYclovir (VALTREX) 500 MG tablet, Take 1 tablet (500 mg total) by mouth 2 (two) times daily as needed., Disp: 30 tablet, Rfl: 0    rizatriptan (MAXALT-MLT) 10 MG disintegrating tablet, Take 1 tablet (10 mg total) by mouth daily as needed for Migraine. May repeat in 2 hours if needed, Disp: 30 tablet, Rfl: 0     Objective:         Vitals:    10/18/19 1436   BP: (!) 143/94   Pulse: 77     Physical Exam   Constitutional: No distress.   Estimated body mass index is 34.77 kg/m² as calculated from the following:    Height as of this encounter: 5' 7" (1.702 m).    Weight as of this encounter: 100.7 kg (222 lb 0.1 oz).    Wt Readings from Last 1 Encounters:  10/18/19 1436 : 100.7 kg (222 lb 0.1 oz)     HENT:   Head: Normocephalic and atraumatic.   Eyes: Conjunctivae are normal. Pupils are equal, round, and reactive to light.   Neck: Normal range of motion.   Cardiovascular: Normal rate and intact distal pulses.    Pulmonary/Chest: Effort normal. No respiratory distress.   Abdominal: Soft. He exhibits no distension.   Neurological: He is alert. Gait normal.   Muscle strength 5/5 proximally and distally throughout.  Able to stand from sitting position without any particular difficulty.    Able to hold squatting position, discomfort referred due to pain.   Skin: No rash noted. No erythema.     Musculoskeletal: Normal range of motion. He exhibits no tenderness.   : strong  No synovitis or significant stress tenderness    AROM: intact  PROM: intact    Devices used by patient: none       Reviewed old and all outside pertinent medical records available.    All lab results personally reviewed and " interpreted by me.  Lab Results   Component Value Date    WBC 10.23 05/06/2019    HGB 14.2 05/06/2019    HCT 39.6 (L) 05/06/2019    MCV 88 05/06/2019    MCH 31.7 (H) 05/06/2019    MCHC 35.9 05/06/2019    RDW 13.8 05/06/2019     05/06/2019    MPV 11.0 05/06/2019       Lab Results   Component Value Date     08/19/2019    K 4.1 08/19/2019     08/19/2019    CO2 22 (L) 08/19/2019    GLU 80 08/19/2019    BUN 18 08/19/2019    CALCIUM 9.1 08/19/2019    PROT 8.1 05/06/2019    ALBUMIN 3.7 08/19/2019    BILITOT 0.7 05/06/2019    AST 37 05/06/2019    ALKPHOS 77 05/06/2019    ALT 33 05/06/2019       Lab Results   Component Value Date    COLORU Yellow 05/06/2019    APPEARANCEUA Clear 05/06/2019    SPECGRAV >=1.030 (A) 05/06/2019    PHUR 6.0 05/06/2019    PROTEINUA 2+ (A) 05/06/2019    KETONESU Negative 05/06/2019    LEUKOCYTESUR Negative 05/06/2019    NITRITE Negative 05/06/2019       Lab Results   Component Value Date    CRP 3.0 05/28/2019       Lab Results   Component Value Date    SEDRATE <2 05/28/2019       Lab Results   Component Value Date    SEDRATE <2 05/28/2019       No components found for: 25OHVITDTOT, 74ALIISE9, 32FLOLPQ9, METHODNOTE    Lab Results   Component Value Date    URICACID 5.6 04/24/2019     No components found for: TSPOTTB    · CK: 830 (5/2019) -> 543 (8/2019)  · Aldolase within reference value    Rheum Labs:   n/a     Infectious Labs:   HIV NR     Imaging:  All imaging reviewed and independently  interpreted by me.    X-ray knee April 2019  Asymmetry along the inferior margin of the left patella raises possibility of minimally displaced fracture fragment from the inferior pole.     ASSESSMENT / PLAN:     Elder Hernandez is a 44 y.o. Black or  male with:    1. HyperCKemia  - no significant or particular reproducible weakness on exam.  - no current features of active rheumatic disorder.  - recommend for blood work as below  - Consider EMG, MRI depending on  results  - Aldolase; Future  - CK; Standing  - Lactate dehydrogenase; Future  - MyoMarker Panel 3; Future  - Anti Sm/RNP Antibody; Future  - MARINA Screen w/Reflex; Future  - Angiotensin converting enzyme; Standing  - C-reactive protein; Standing  - Sedimentation rate; Standing  - Comprehensive metabolic panel; Standing    2. Other specified counseling  - over 10 minutes spent regarding below topics:  - Weight loss counseling done.  - Nutrition and exercise counseling.  - Limitation of alcohol consumption.  - exacerbation of rhabdomyolysis factor  - Medication counseling provided.    Follow up in about 1 month (around 11/18/2019).    Method of contact with patient concerns: [facility]    Disclaimer:  This note is prepared using voice recognition software and as such is likely to have errors and has not been proof read. Please contact me for questions.     Time spent: 60 minutes in face to face discussion concerning diagnosis, prognosis, review of lab and test results, benefits of treatment as well as management of disease, counseling of patient and coordination of care between various health care providers.  Greater than half the time spent was used for coordination of care and counseling of patient.    Mannie Holland M.D.  Rheumatology Department   [facility] [place]

## 2019-10-21 ENCOUNTER — LAB VISIT (OUTPATIENT)
Dept: LAB | Facility: HOSPITAL | Age: 44
End: 2019-10-21
Attending: INTERNAL MEDICINE
Payer: OTHER GOVERNMENT

## 2019-10-21 DIAGNOSIS — N18.30 STAGE 3 CHRONIC KIDNEY DISEASE: ICD-10-CM

## 2019-10-21 LAB
ACE SERPL-CCNC: 40 U/L (ref 16–85)
ALDOLASE SERPL-CCNC: 6.3 U/L (ref 1.2–7.6)
ANA SER QL IF: NORMAL
ANTI SM/RNP ANTIBODY: 2.03 EU (ref 0–19.99)
ANTI-SM/RNP INTERPRETATION: NEGATIVE

## 2019-10-21 PROCEDURE — 36415 COLL VENOUS BLD VENIPUNCTURE: CPT

## 2019-10-21 PROCEDURE — 80069 RENAL FUNCTION PANEL: CPT

## 2019-10-22 LAB
ALBUMIN SERPL BCP-MCNC: 3.7 G/DL (ref 3.5–5.2)
ANION GAP SERPL CALC-SCNC: 8 MMOL/L (ref 8–16)
ANION GAP SERPL CALC-SCNC: 8 MMOL/L (ref 8–16)
BUN SERPL-MCNC: 19 MG/DL (ref 6–20)
BUN SERPL-MCNC: 19 MG/DL (ref 6–20)
CALCIUM SERPL-MCNC: 9.4 MG/DL (ref 8.7–10.5)
CALCIUM SERPL-MCNC: 9.4 MG/DL (ref 8.7–10.5)
CHLORIDE SERPL-SCNC: 105 MMOL/L (ref 95–110)
CHLORIDE SERPL-SCNC: 105 MMOL/L (ref 95–110)
CO2 SERPL-SCNC: 28 MMOL/L (ref 23–29)
CO2 SERPL-SCNC: 28 MMOL/L (ref 23–29)
CREAT SERPL-MCNC: 1.7 MG/DL (ref 0.5–1.4)
CREAT SERPL-MCNC: 1.7 MG/DL (ref 0.5–1.4)
EST. GFR  (AFRICAN AMERICAN): 54.7 ML/MIN/1.73 M^2
EST. GFR  (AFRICAN AMERICAN): 54.7 ML/MIN/1.73 M^2
EST. GFR  (NON AFRICAN AMERICAN): 47.3 ML/MIN/1.73 M^2
EST. GFR  (NON AFRICAN AMERICAN): 47.3 ML/MIN/1.73 M^2
GLUCOSE SERPL-MCNC: 79 MG/DL (ref 70–110)
GLUCOSE SERPL-MCNC: 79 MG/DL (ref 70–110)
PHOSPHATE SERPL-MCNC: 2.8 MG/DL (ref 2.7–4.5)
POTASSIUM SERPL-SCNC: 4.2 MMOL/L (ref 3.5–5.1)
POTASSIUM SERPL-SCNC: 4.2 MMOL/L (ref 3.5–5.1)
SODIUM SERPL-SCNC: 141 MMOL/L (ref 136–145)
SODIUM SERPL-SCNC: 141 MMOL/L (ref 136–145)

## 2019-10-25 ENCOUNTER — OFFICE VISIT (OUTPATIENT)
Dept: NEPHROLOGY | Facility: CLINIC | Age: 44
End: 2019-10-25
Payer: OTHER GOVERNMENT

## 2019-10-25 VITALS
HEIGHT: 67 IN | BODY MASS INDEX: 35.02 KG/M2 | SYSTOLIC BLOOD PRESSURE: 126 MMHG | HEART RATE: 60 BPM | DIASTOLIC BLOOD PRESSURE: 86 MMHG | RESPIRATION RATE: 20 BRPM | WEIGHT: 223.13 LBS

## 2019-10-25 DIAGNOSIS — I10 ESSENTIAL HYPERTENSION: ICD-10-CM

## 2019-10-25 DIAGNOSIS — T79.6XXS TRAUMATIC RHABDOMYOLYSIS, SEQUELA: ICD-10-CM

## 2019-10-25 DIAGNOSIS — N05.1 FSGS (FOCAL SEGMENTAL GLOMERULOSCLEROSIS): ICD-10-CM

## 2019-10-25 DIAGNOSIS — R74.8: Primary | ICD-10-CM

## 2019-10-25 DIAGNOSIS — N18.30 STAGE 3 CHRONIC KIDNEY DISEASE: ICD-10-CM

## 2019-10-25 PROCEDURE — 99999 PR PBB SHADOW E&M-EST. PATIENT-LVL V: ICD-10-PCS | Mod: PBBFAC,,, | Performed by: INTERNAL MEDICINE

## 2019-10-25 PROCEDURE — 99215 PR OFFICE/OUTPT VISIT, EST, LEVL V, 40-54 MIN: ICD-10-PCS | Mod: S$PBB,,, | Performed by: INTERNAL MEDICINE

## 2019-10-25 PROCEDURE — 99999 PR PBB SHADOW E&M-EST. PATIENT-LVL V: CPT | Mod: PBBFAC,,, | Performed by: INTERNAL MEDICINE

## 2019-10-25 PROCEDURE — 99215 OFFICE O/P EST HI 40 MIN: CPT | Mod: PBBFAC | Performed by: INTERNAL MEDICINE

## 2019-10-25 PROCEDURE — 99215 OFFICE O/P EST HI 40 MIN: CPT | Mod: S$PBB,,, | Performed by: INTERNAL MEDICINE

## 2019-10-25 NOTE — PROGRESS NOTES
"Nephrology clinic f/u note:  Date of clinic visit: 10/25/19     Referring physician: Dr. Baltazar  Reason for f/u and chief c/o: CKD and proteinuria, and asymptomatic increase in CKD     HPI: Pt is a 43 y/o AA male with CKD stage 3 and proteinuria, who presents for f/u. 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 Kidney biopsy was done in [place] in 2015. Pt also has a h/o of frequently occurring rhabdomyolysis, and recently has been noted to have asymptomatic increase (after adequate rest was achieved) in creatine kinase (CK). TSH was normal in April 2019. 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 as he is required to take part in strenuous physical activity from time to time to keep fit. Pt reported previously that he had seen "dark colored urine" after each exercising.     Pt returns for f/u today. Chart was reviewed. He has no acute or new issues, no CP, no SOB, no blood in urine, urine is not dark. Denies using NSAIds. Labs reviewed with pt.       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 Kidney biopsy was done in [place] in 2015, CKD stage 3, rhabdomyolysis, Acid reflux, irritable bowel syndrome, Gout, Hypertension, PTSD (post-traumatic stress disorder), and Sleep apnea.     PAST SURGICAL HISTORY:  He  has a past surgical history that includes Shoulder surgery (Left, 2015) and Renal biopsy (Right, 2015).     SOCIAL HISTORY:  He  reports that he has never smoked. He does not have any smokeless tobacco history on file. He reports that he drinks alcohol. He reports that he does not use drugs.     FAMILY MEDICAL HISTORY:  His family history is not on file.     Review of patient's allergies indicates:  No Known Allergies     Meds reviewed     Current Outpatient Medications:     " "allopurinol (ZYLOPRIM) 300 MG tablet, Take 1/2 tablet by mouth once daily., Disp: 45 tablet, Rfl: 1    amLODIPine (NORVASC) 2.5 MG tablet, Take 1 tablet (2.5 mg total) by mouth once daily., Disp: 90 tablet, Rfl: 1    dicyclomine (BENTYL) 20 mg tablet, Take 1 tablet (20 mg total) by mouth 3 (three) times daily as needed (abdominal pain)., Disp: 90 tablet, Rfl: 0    losartan (COZAAR) 25 MG tablet, Take 1 tablet (25 mg total) by mouth once daily., Disp: 90 tablet, Rfl: 3    MULTIVITAMIN ORAL, Take 1 tablet by mouth once daily., Disp: , Rfl:     prazosin (MINIPRESS) 2 MG Cap, Take 1 capsule (2 mg total) by mouth every evening., Disp: 90 capsule, Rfl: 3    predniSONE (DELTASONE) 10 MG tablet, Take 20 mg by mouth once daily., Disp: , Rfl:     rizatriptan (MAXALT-MLT) 10 MG disintegrating tablet, Take 1 tablet (10 mg total) by mouth daily as needed for Migraine. May repeat in 2 hours if needed, Disp: 30 tablet, Rfl: 0    sertraline (ZOLOFT) 100 MG tablet, Take 1 tablet (100 mg total) by mouth once daily., Disp: 90 tablet, Rfl: 3    sildenafil (VIAGRA) 100 MG tablet, Take 1 tablet (100 mg total) by mouth daily as needed for Erectile Dysfunction., Disp: 12 tablet, Rfl: 0    topiramate (TOPAMAX) 25 MG tablet, Take 1 tablet (25 mg total) by mouth 2 (two) times daily. take one tablet at night for first week., Disp: 180 tablet, Rfl: 0    traZODone (DESYREL) 150 MG tablet, Take 1 tablet (150 mg total) by mouth every evening., Disp: 90 tablet, Rfl: 2    valACYclovir (VALTREX) 500 MG tablet, Take 1 tablet (500 mg total) by mouth 2 (two) times daily as needed., Disp: 30 tablet, Rfl: 0        REVIEW OF SYSTEMS:  Patient has no fever, fatigue, visual changes, chest pain, edema, cough, dyspnea, nausea, vomiting, constipation, diarrhea, arthralgias, pruritis, dizziness, weakness, depression, confusion.      PHYSICAL EXAM:  Blood pressure 126/86, pulse 60, height 5' 7" (1.702 m), weight 101.2 Kg, last visit 98.9 kg "   Gen:    WDWN male in no apparent distress  Psych: Normal mood and affect  Skin:    No rashes or ulcers  Eyes:   Normal conjunctiva and lids, PERRLA  ENT:    Normal hearing with no oropharyngeal lesions  Neck:   No JVD  Chest:  Clear with no rales, rhonchi, wheezing with normal effort  CV:      Regular with no murmurs, gallops or rubs  Abd:     Soft, nontender, no distension, positive bowel sounds  Ext:      No cyanosis, clubbing or edema     Labs: reviewed  BMP  Lab Results   Component Value Date     10/21/2019     10/21/2019    K 4.2 10/21/2019    K 4.2 10/21/2019     10/21/2019     10/21/2019    CO2 28 10/21/2019    CO2 28 10/21/2019    BUN 19 10/21/2019    BUN 19 10/21/2019    CREATININE 1.7 (H) 10/21/2019    CREATININE 1.7 (H) 10/21/2019    CALCIUM 9.4 10/21/2019    CALCIUM 9.4 10/21/2019    ANIONGAP 8 10/21/2019    ANIONGAP 8 10/21/2019    ESTGFRAFRICA 54.7 (A) 10/21/2019    ESTGFRAFRICA 54.7 (A) 10/21/2019    EGFRNONAA 47.3 (A) 10/21/2019    EGFRNONAA 47.3 (A) 10/21/2019     Lab Results   Component Value Date    WBC 10.23 05/06/2019    HGB 14.2 05/06/2019    HCT 39.6 (L) 05/06/2019    MCV 88 05/06/2019     05/06/2019        's     PSA 0.4  U/a: 2+ protein, neg blood, no RBC's, no casts  Urine protein/Cr 910 mg  ESR normal  CRP normal  HIV neg        IMPRESSION AND RECOMMENDATIONS:  43 y/o male with CKD stage 3, and past h/o of frequent rhabdomyolysis and asymptomatic increase in CK:  The impression is:     1. Renal: s Cr stable, unchanged  Stable renal function.  CKD stage 3 at baseline  Exact reason for CKD not clear  DDX HTN, vs episodes of low BP/hypotension and nearly daily diarrhea from IBS vs damage due to rhabdomyolysis  Also, pt is quite muscular, therefore, s Cr of 1.5 is not very high for him     Proteinuria is in sub-nephrotic range  [facility] kidney biopsy report c/w FSGS with glomerulomegaly, likely secondary FSGS  HIV noted neg  Denies social risk factors for hep  C, hep B  Secondary FSGS usually does not cause renal failure. It does cause proteinuria, even nephrotic syndrome.  Secondary FSGS is usually caused by renal hyperfilteration     2. Past h/o of rhabdomyolysis: likely unrelated  Due to strenuous physical activity     3. Rise in CK: discussed extensively with pt  Increase in CK recorded even after adequate rest  Has 1 brother and 5 sisters, no similar condition in the siblings   TSH was normal  DDX: secondary to prednisone (appears to have taken periodically for gout)  DDX: genetic/mitochondrial disease/meuromuscular disease   Rheumatology note reviewed        Plans and recommendations:  As discussed above  Opportunity for questions provided.  Discussed with pt referral to neurology, as well as genetics  RTC 4 months or sooner pending BMP results today or results of biopsy from [place]  Total time spent 40 minutes including time needed to review the records, the   patient evaluation, documentation, face-to-face discussion with the patient,   more than 50% of the time was spent on coordination of care and counseling.    Level V visit.     Eliel Bello

## 2019-10-29 NOTE — PROGRESS NOTES
Dear Tommie Baltazar MD,    I recently cared for Elder Hernandez and performed an endoscopy.  Tissue was sent for pathology evaluation and I will have a letter written to ask the patient to repeat the colonoscopy in 10 years.  The pathology showed that there was only benign tissue.  Thank you for allowing me to participate in the care of your patient.  Please call me for any questions that you might have.      Dr. Niraj Grey  [phone] cell  [phone] office      NURSING STAFF:Please  inform the patient that I reviewed the recent pathology obtained at the time of colonoscopy.    The results showed that there was normal tissue present which is benign and based on that, I recommend that the patient have a repeat colonoscopy performed in 10 years.     If the patient has MyChart, this message has been sent to them.  Confirm that they read the note.  If not, copy the information and print a letter to send to the patient at this time.  Confirm that a notation to the PCP was done.      Dear Elder Hernandez,    This is to inform you that I have reviewed your recent colonoscopy pathology.  The results showed that you had normal tissue present which is benign and there was no sign of inflammation or colitis on the biopsies.  Based on that, I recommend that you have a repeat colonoscopy performed in 10 years.      Dr. Niraj Grey  [phone]

## 2019-11-01 LAB
ANTI-PM/SCL AB: <20 UNITS
ANTI-SS-A 52 KD AB, IGG: <20 UNITS
EJ AB SER QL: NEGATIVE
ENA JO1 AB SER IA-ACNC: <20 UNITS
ENA SM+RNP AB SER IA-ACNC: <20 UNITS
FIBRILLARIN (U3 RNP): NEGATIVE
KU AB SER QL: NEGATIVE
MDA-5 (P140): <20 UNITS
MI2 AB SER QL: NEGATIVE
NXP-2 (P140): <20 UNITS
OJ AB SER QL: NEGATIVE
PL12 AB SER QL: NEGATIVE
PL7 AB SER QL: NEGATIVE
SRP AB SERPL QL: NEGATIVE
TIF1 GAMMA (P155/140): <20 UNITS
U2 SNRNP: NEGATIVE

## 2019-11-08 NOTE — PROGRESS NOTES
Digital Medicine Program Enrollment  HPI     4th  attempt for enrollment call.  No answer, left voicemail.  Also sent enrolling provider message.

## 2019-11-18 ENCOUNTER — OFFICE VISIT (OUTPATIENT)
Dept: INTERNAL MEDICINE | Facility: CLINIC | Age: 44
End: 2019-11-18
Payer: OTHER GOVERNMENT

## 2019-11-18 VITALS
DIASTOLIC BLOOD PRESSURE: 90 MMHG | TEMPERATURE: 98 F | OXYGEN SATURATION: 97 % | BODY MASS INDEX: 35.94 KG/M2 | SYSTOLIC BLOOD PRESSURE: 132 MMHG | HEART RATE: 79 BPM | WEIGHT: 229.5 LBS

## 2019-11-18 DIAGNOSIS — S39.012A STRAIN OF LUMBAR REGION, INITIAL ENCOUNTER: Primary | ICD-10-CM

## 2019-11-18 PROCEDURE — 99213 OFFICE O/P EST LOW 20 MIN: CPT | Mod: PBBFAC | Performed by: INTERNAL MEDICINE

## 2019-11-18 PROCEDURE — 99999 PR PBB SHADOW E&M-EST. PATIENT-LVL III: ICD-10-PCS | Mod: PBBFAC,,, | Performed by: INTERNAL MEDICINE

## 2019-11-18 PROCEDURE — 99999 PR PBB SHADOW E&M-EST. PATIENT-LVL III: CPT | Mod: PBBFAC,,, | Performed by: INTERNAL MEDICINE

## 2019-11-18 PROCEDURE — 99214 OFFICE O/P EST MOD 30 MIN: CPT | Mod: S$PBB,,, | Performed by: INTERNAL MEDICINE

## 2019-11-18 PROCEDURE — 99214 PR OFFICE/OUTPT VISIT, EST, LEVL IV, 30-39 MIN: ICD-10-PCS | Mod: S$PBB,,, | Performed by: INTERNAL MEDICINE

## 2019-11-18 RX ORDER — METHYLPREDNISOLONE 4 MG/1
TABLET ORAL
Qty: 21 TABLET | Refills: 0 | Status: SHIPPED | OUTPATIENT
Start: 2019-11-18 | End: 2019-12-19 | Stop reason: SDUPTHER

## 2019-11-18 RX ORDER — MELOXICAM 15 MG/1
15 TABLET ORAL DAILY
Qty: 15 TABLET | Refills: 0 | Status: SHIPPED | OUTPATIENT
Start: 2019-11-18 | End: 2020-04-17

## 2019-11-18 RX ORDER — METHOCARBAMOL 750 MG/1
TABLET, FILM COATED ORAL
Qty: 30 TABLET | Refills: 0 | Status: SHIPPED | OUTPATIENT
Start: 2019-11-18 | End: 2020-09-14 | Stop reason: SDUPTHER

## 2019-11-18 NOTE — PROGRESS NOTES
Subjective:      Patient ID: Elder Hernandez is a 44 y.o. male.    Chief Complaint: Back Pain  45 yo with   Patient Active Problem List   Diagnosis    PTSD (post-traumatic stress disorder)    Irritable bowel syndrome with diarrhea    Chronic kidney disease    Migraine with aura and without status migrainosus, not intractable    Erectile dysfunction    HSV-2 infection    HTN (hypertension)    Benign prostatic hyperplasia without lower urinary tract symptoms    Arthritis of both knees    Gout    Bilateral foot pain    FSGS (focal segmental glomerulosclerosis)    Rhabdomyolysis    Lower abdominal pain    Diarrhea    Abdominal pain    Colon polyp     Past Medical History:   Diagnosis Date    Acid reflux     Gout     Hypertension     Kidney problem     PTSD (post-traumatic stress disorder)     Sleep apnea      Here today c/o back pain.   Back Pain   This is a new problem. Episode onset: 3 days ago, felt a pop in middle lumbar spine. The problem occurs constantly. The problem has been gradually improving since onset. The pain is present in the lumbar spine. The quality of the pain is described as aching. The pain does not radiate. The pain is moderate. The pain is the same all the time. The symptoms are aggravated by bending and twisting. Stiffness is present all day. Pertinent negatives include no abdominal pain, bladder incontinence, bowel incontinence, chest pain, dysuria, fever, headaches, leg pain, numbness, paresis or paresthesias. He has tried heat (flexeril(not much help), icy hot(helps)) for the symptoms. The treatment provided mild relief.     Review of Systems   Constitutional: Negative for chills and fever.   HENT: Negative for ear pain and sore throat.    Respiratory: Negative for cough.    Cardiovascular: Negative for chest pain.   Gastrointestinal: Negative for abdominal pain, blood in stool and bowel incontinence.   Genitourinary: Negative for bladder incontinence, dysuria  and hematuria.   Musculoskeletal: Positive for back pain.   Neurological: Negative for seizures, syncope, numbness, headaches and paresthesias.     Objective:   BP (!) 132/90 (BP Location: Left arm, Patient Position: Sitting, BP Method: Large (Manual))   Pulse 79   Temp 97.5 °F (36.4 °C) (Tympanic)   Wt 104.1 kg (229 lb 8 oz)   SpO2 97%   BMI 35.94 kg/m²     Physical Exam   Constitutional: He is oriented to person, place, and time. He appears well-developed and well-nourished. No distress.   HENT:   Head: Normocephalic and atraumatic.   Mouth/Throat: Oropharynx is clear and moist.   Eyes: Pupils are equal, round, and reactive to light. EOM are normal.   Neck: Neck supple. No thyromegaly present.   Cardiovascular: Normal rate and regular rhythm.   Pulmonary/Chest: Breath sounds normal. He has no wheezes. He has no rales.   Abdominal: Soft. Bowel sounds are normal. There is no tenderness.   Musculoskeletal: He exhibits no edema.        Lumbar back: He exhibits tenderness. He exhibits normal range of motion, no bony tenderness, no spasm and normal pulse.        Back:    Lymphadenopathy:     He has no cervical adenopathy.   Neurological: He is alert and oriented to person, place, and time. He displays normal reflexes. No sensory deficit. He exhibits normal muscle tone.   Skin: Skin is warm and dry.   Psychiatric: He has a normal mood and affect. His behavior is normal.       Assessment:     1. Strain of lumbar region, initial encounter      Plan:   Strain of lumbar region, initial encounter  -     methocarbamol (ROBAXIN) 750 MG Tab; Take one or two tablets by mouth three times daily as needed for muscle spasm.  Dispense: 30 tablet; Refill: 0  -     meloxicam (MOBIC) 15 MG tablet; Take 1 tablet (15 mg total) by mouth once daily. For pain and inflammation  Dispense: 15 tablet; Refill: 0  -     methylPREDNISolone (MEDROL DOSEPACK) 4 mg tablet; use as directed  Dispense: 21 tablet; Refill: 0        Lab Frequency Next  Occurrence   Urinalysis Once 08/26/2019   Urinalysis Once 10/25/2019   CK Once 10/25/2019   Renal function panel Once 10/25/2019   CK     Angiotensin converting enzyme     C-reactive protein     Sedimentation rate     Comprehensive metabolic panel         Problem List Items Addressed This Visit     None      Visit Diagnoses     Strain of lumbar region, initial encounter    -  Primary    Relevant Medications    methocarbamol (ROBAXIN) 750 MG Tab    meloxicam (MOBIC) 15 MG tablet    methylPREDNISolone (MEDROL DOSEPACK) 4 mg tablet          Follow up if symptoms worsen or fail to improve.

## 2019-11-22 NOTE — PROGRESS NOTES
Digital Medicine Program Enrollment      Our goal is to get BP to consistently below 130/80mmHg and make the process convenient so patient can avoid extra trips to the office. Getting your blood pressure below 130/80mmHg (definition of control) will reduce your risk for heart attack, kidney failure, stroke and death (as well as kidney failure, eye disease, & dementia)      Reviewed that the Digital Medicine care team - consisting of a clinician and a health  - will follow the most current evidence-based national guidelines for treating your condition.  The health  will focus on lifestyle modifications and motivation while the clinician will focus on medication therapy.  The care team will review all data on a regular basis and reach out as needed.      Explained that one of the key parts of the program is communication with the care team.  Asked patient to respond to outreach attempts and complete questionnaires.  Stressed importance of medication adherence.      Explained that we expect patient to obtain several blood pressures per week at random times of day.  Instructed patient not to allow anyone else to use phone and monitoring device.  Confirmed appropriate BP monitoring technique.

## 2019-11-26 ENCOUNTER — PATIENT OUTREACH (OUTPATIENT)
Dept: OTHER | Facility: OTHER | Age: 44
End: 2019-11-26

## 2019-12-02 ENCOUNTER — OFFICE VISIT (OUTPATIENT)
Dept: INTERNAL MEDICINE | Facility: CLINIC | Age: 44
End: 2019-12-02
Payer: OTHER GOVERNMENT

## 2019-12-02 VITALS
TEMPERATURE: 96 F | OXYGEN SATURATION: 98 % | WEIGHT: 229.75 LBS | BODY MASS INDEX: 35.98 KG/M2 | SYSTOLIC BLOOD PRESSURE: 150 MMHG | HEART RATE: 57 BPM | DIASTOLIC BLOOD PRESSURE: 102 MMHG

## 2019-12-02 DIAGNOSIS — S39.012A STRAIN OF LUMBAR REGION, INITIAL ENCOUNTER: Primary | ICD-10-CM

## 2019-12-02 PROCEDURE — 99213 OFFICE O/P EST LOW 20 MIN: CPT | Mod: S$PBB,,, | Performed by: INTERNAL MEDICINE

## 2019-12-02 PROCEDURE — 99213 PR OFFICE/OUTPT VISIT, EST, LEVL III, 20-29 MIN: ICD-10-PCS | Mod: S$PBB,,, | Performed by: INTERNAL MEDICINE

## 2019-12-02 PROCEDURE — 99999 PR PBB SHADOW E&M-EST. PATIENT-LVL III: ICD-10-PCS | Mod: PBBFAC,,, | Performed by: INTERNAL MEDICINE

## 2019-12-02 PROCEDURE — 99999 PR PBB SHADOW E&M-EST. PATIENT-LVL III: CPT | Mod: PBBFAC,,, | Performed by: INTERNAL MEDICINE

## 2019-12-02 PROCEDURE — 99213 OFFICE O/P EST LOW 20 MIN: CPT | Mod: PBBFAC | Performed by: INTERNAL MEDICINE

## 2019-12-04 NOTE — PROGRESS NOTES
Subjective:      Patient ID: Elder Hernandez is a 44 y.o. male.    Chief Complaint: Back Pain    HPI   43 yo with   Patient Active Problem List   Diagnosis    PTSD (post-traumatic stress disorder)    Irritable bowel syndrome with diarrhea    Chronic kidney disease    Migraine with aura and without status migrainosus, not intractable    Erectile dysfunction    HSV-2 infection    HTN (hypertension)    Benign prostatic hyperplasia without lower urinary tract symptoms    Arthritis of both knees    Gout    Bilateral foot pain    FSGS (focal segmental glomerulosclerosis)    Rhabdomyolysis    Lower abdominal pain    Diarrhea    Abdominal pain    Colon polyp     Past Medical History:   Diagnosis Date    Acid reflux     Gout     Hypertension     Kidney problem     PTSD (post-traumatic stress disorder)     Sleep apnea      Here today for f/u on back pain.     Pain continues to be right-sided.  Can be 0 but up to 9/10.  Back is stiff in the mornings.  Stiffness improves as the day progresses.  Has pain with range of motion and if sitting still for too long.  Trial of steroids improved symptoms for few days but symptoms returned.   He is limiting his activities. He also reports he was out of his blood pressure medications for the last few days.  Review of Systems   Constitutional: Negative for chills and fever.   HENT: Negative for ear pain and sore throat.    Respiratory: Negative for cough.    Cardiovascular: Negative for chest pain.   Gastrointestinal: Negative for abdominal pain and blood in stool.   Genitourinary: Negative for dysuria and hematuria.   Neurological: Negative for seizures and syncope.     Objective:   BP (!) 150/102 (BP Location: Left arm, Patient Position: Sitting, BP Method: Large (Manual))   Pulse (!) 57   Temp 96 °F (35.6 °C) (Tympanic)   Wt 104.2 kg (229 lb 11.5 oz)   SpO2 98%   BMI 35.98 kg/m²     Physical Exam   Constitutional: He is oriented to person, place, and  time. He appears well-developed and well-nourished. No distress.   Cardiovascular: Normal rate.   Pulmonary/Chest: Effort normal and breath sounds normal.   Musculoskeletal: He exhibits no edema.        Lumbar back: He exhibits normal range of motion and no tenderness.   Neurological: He is alert and oriented to person, place, and time. He exhibits normal muscle tone.   Skin: Skin is warm and dry.   Psychiatric: He has a normal mood and affect. His behavior is normal.       Assessment:     1. Strain of lumbar region, initial encounter      Plan:   Strain of lumbar region, initial encounter  -     Ambulatory Referral to Physiatry        Lab Frequency Next Occurrence   Urinalysis Once 08/26/2019   Urinalysis Once 10/25/2019   CK Once 10/25/2019   Renal function panel Once 10/25/2019   CK     Angiotensin converting enzyme     C-reactive protein     Sedimentation rate     Comprehensive metabolic panel         Problem List Items Addressed This Visit     None      Visit Diagnoses     Strain of lumbar region, initial encounter    -  Primary    Relevant Orders    Ambulatory Referral to Physiatry          Follow up if symptoms worsen or fail to improve.

## 2019-12-10 ENCOUNTER — PATIENT MESSAGE (OUTPATIENT)
Dept: PHYSICAL MEDICINE AND REHAB | Facility: CLINIC | Age: 44
End: 2019-12-10

## 2019-12-10 ENCOUNTER — HOSPITAL ENCOUNTER (OUTPATIENT)
Dept: RADIOLOGY | Facility: HOSPITAL | Age: 44
Discharge: HOME | End: 2019-12-10
Attending: PHYSICAL MEDICINE & REHABILITATION
Payer: OTHER GOVERNMENT

## 2019-12-10 ENCOUNTER — OFFICE VISIT (OUTPATIENT)
Dept: PHYSICAL MEDICINE AND REHAB | Facility: CLINIC | Age: 44
End: 2019-12-10
Payer: OTHER GOVERNMENT

## 2019-12-10 VITALS
SYSTOLIC BLOOD PRESSURE: 140 MMHG | WEIGHT: 229 LBS | BODY MASS INDEX: 35.94 KG/M2 | HEART RATE: 60 BPM | DIASTOLIC BLOOD PRESSURE: 96 MMHG | RESPIRATION RATE: 12 BRPM | HEIGHT: 67 IN

## 2019-12-10 DIAGNOSIS — M47.816 LUMBAR FACET ARTHROPATHY: ICD-10-CM

## 2019-12-10 DIAGNOSIS — M62.830 BACK MUSCLE SPASM: ICD-10-CM

## 2019-12-10 DIAGNOSIS — M47.816 LUMBAR FACET ARTHROPATHY: Primary | ICD-10-CM

## 2019-12-10 PROCEDURE — 72110 X-RAY EXAM L-2 SPINE 4/>VWS: CPT | Mod: TC

## 2019-12-10 PROCEDURE — 99999 PR PBB SHADOW E&M-EST. PATIENT-LVL IV: ICD-10-PCS | Mod: PBBFAC,,, | Performed by: PHYSICAL MEDICINE & REHABILITATION

## 2019-12-10 PROCEDURE — 72110 XR LUMBAR SPINE COMPLETE 5 VIEW: ICD-10-PCS | Mod: 26,,, | Performed by: RADIOLOGY

## 2019-12-10 PROCEDURE — 99999 PR PBB SHADOW E&M-EST. PATIENT-LVL IV: CPT | Mod: PBBFAC,,, | Performed by: PHYSICAL MEDICINE & REHABILITATION

## 2019-12-10 PROCEDURE — 99204 OFFICE O/P NEW MOD 45 MIN: CPT | Mod: S$PBB,,, | Performed by: PHYSICAL MEDICINE & REHABILITATION

## 2019-12-10 PROCEDURE — 72110 X-RAY EXAM L-2 SPINE 4/>VWS: CPT | Mod: 26,,, | Performed by: RADIOLOGY

## 2019-12-10 PROCEDURE — 99214 OFFICE O/P EST MOD 30 MIN: CPT | Mod: PBBFAC,25 | Performed by: PHYSICAL MEDICINE & REHABILITATION

## 2019-12-10 PROCEDURE — 99204 PR OFFICE/OUTPT VISIT, NEW, LEVL IV, 45-59 MIN: ICD-10-PCS | Mod: S$PBB,,, | Performed by: PHYSICAL MEDICINE & REHABILITATION

## 2019-12-10 NOTE — PATIENT INSTRUCTIONS
Exercises to Strengthen Your Lower Back  Strong lower back and abdominal muscles work together to support your spine. The exercises below will help strengthen the lower back. It is important that you begin exercising slowly and increase levels gradually.  Always begin any exercise program with stretching. If you feel pain while doing any of these exercises, stop and talk to your doctor about a more specific exercise program that better suits your condition.   Low back stretch  The point of stretching is to make you more flexible and increase your range of motion. Stretch only as much as you are able. Stretch slowly. Do not push your stretch to the limit. If at any point you feel pain while stretching, this is your (temporary) limit.  · Lie on your back with your knees bent and both feet on the ground.  · Slowly raise your left knee to your chest as you flatten your lower back against the floor. Hold for 5 seconds.  · Relax and repeat the exercise with your right knee.  · Do 10 of these exercises for each leg.  · Repeat hugging both knees to your chest at the same time.  Building lower back strength  Start your exercise routine with 10 to 30 minutes a day, 1 to 3 times a day.  Initial exercises  Lying on your back:  1. Ankle pumps: Move your foot up and down, towards your head, and then away. Repeat 10 times with each foot.  2. Heel slides: Slowly bend your knee, drawing the heel of your foot towards you. Then slide your heel/foot from you, straightening your knee. Do not lift your foot off the floor (this is not a leg lift).  3. Abdominal contraction: Bend your knees and put your hands on your stomach. Tighten your stomach muscles. Hold for 5 seconds, then relax. Repeat 10 times.  4. Straight leg raise: Bend one leg at the knee and keep the other leg straight. Tighten your stomach muscles. Slowly lift your straight leg 6 to 12 inches off the floor and hold for up to 5 seconds. Repeat 10 times on each  side.  Standin. Wall squats: Stand with your back against the wall. Move your feet about 12 inches away from the wall. Tighten your stomach muscles, and slowly bend your knees until they are at about a 45 degree angle. Do not go down too far. Hold about 5 seconds. Then slowly return to your starting position. Repeat 10 times.  2. Heel raises: Stand facing the wall. Slowly raise the heels of your feet up and down, while keeping your toes on the floor. If you have trouble balancing, you can touch the wall with your hands. Repeat 10 times.  More advanced exercises  When you feel comfortable enough, try these exercises.  1. Kneeling lumbar extension: Begin on your hands and knees. At the same time, raise and straighten your right arm and left leg until they are parallel to the ground. Hold for 2 seconds and come back slowly to a starting position. Repeat with left arm and right leg, alternating 10 times.  2. Prone lumbar extension: Lie face down, arms extended overhead, palms on the floor. At the same time, raise your right arm and left leg as high as comfortably possible. Hold for 10 seconds and slowly return to start. Repeat with left arm and right leg, alternating 10 times. Gradually build up to 20 times. (Advanced: Repeat this exercise raising both arms and both legs a few inches off the floor at the same time. Hold for 5 seconds and release.)  3. Pelvic tilt: Lie on the floor on your back with your knees bent at 90 degrees. Your feet should be flat on the floor. Inhale, exhale, then slowly contract your abdominal muscles bringing your navel toward your spine. Let your pelvis rock back until your lower back is flat on the floor. Hold for 10 seconds while breathing smoothly.  4. Abdominal crunch: Perform a pelvic tilt (above) flattening your lower back against the floor. Holding the tension in your abdominal muscles, take another breath and raise your shoulder blades off the ground (this is not a full sit-up).  Keep your head in line with your body (dont bend your neck forward). Hold for 2 seconds, then slowly lower.  Date Last Reviewed: 6/1/2016  © 2315-3859 [employer]. [address] [place] [address]. All rights reserved. This information is not intended as a substitute for professional medical care. Always follow your healthcare professional's instructions.        Relieving Back Pain  Back pain is a common problem. You can strain back muscles by lifting too much weight or just by moving the wrong way. Back strain can be uncomfortable, even painful. And it can take weeks or months to improve. To help yourself feel better and prevent future back strains, try these tips.  Important Note: Do not give aspirin to children or teens without first discussing it with your healthcare provider.      ? Ice    Ice reduces muscle pain and swelling. It helps most during the first 24 to 48 hours after an injury.  · Wrap an ice pack or a bag of frozen peas in a thin towel. (Never place ice directly on your skin.)  · Place the ice where your back hurts the most.  · Dont ice for more than 20 minutes at a time.  · You can use ice several times a day.  ? Medicines  Over-the-counter pain relievers can include acetaminophen and anti-inflammatory medicines, which includes aspirin or ibuprofen. They can help ease discomfort. Some also reduce swelling.  · Tell your healthcare provider about any medicines you are already taking.  · Take medicines only as directed.  ? Heat  After the first 48 hours, heat can relax sore muscles and improve blood flow.  · Try a warm bath or shower. Or use a heating pad set on low. To prevent a burn, keep a cloth between you and the heating pad.  · Dont use a heating pad for more than 15 minutes at a time. Never sleep on a heating pad.  Date Last Reviewed: 9/1/2015  © 2093-0076 [employer]. [address] [place] [address]. All rights reserved. This information is not  intended as a substitute for professional medical care. Always follow your healthcare professional's instructions.        Back Care Tips    Caring for your back  These are things you can do to prevent a recurrence of acute back pain and to reduce symptoms from chronic back pain:  · Maintain a healthy weight. If you are overweight, losing weight will help most types of back pain.  · Exercise is an important part of recovery from most types of back pain. The muscles behind and in front of the spine support the back. This means strengthening both the back muscles and the abdominal muscles will provide better support for your spine.   · Swimming and brisk walking are good overall exercises to improve your fitness level.  · Practice safe lifting methods (below).  · Practice good posture when sitting, standing and walking. Avoid prolonged sitting. This puts more stress on the lower back than standing or walking.  · Wear quality shoes with sufficient arch support. Foot and ankle alignment can affect back symptoms. Women should avoid wearing high heels.  · Therapeutic massage can help relax the back muscles without stretching them.  · During the first 24 to 72 hours after an acute injury or flare-up of chronic back pain, apply an ice pack to the painful area for 20 minutes and then remove it for 20 minutes, over a period of 60 to 90 minutes, or several times a day. As a safety precaution, do not use a heating pad at bedtime. Sleeping on a heating pad can lead to skin burns or tissue damage.  · You can alternate ice and heat therapies.  Medications  Talk to your healthcare provider before using medicines, especially if you have other medical problems or are taking other medicines.  · You may use acetaminophen or ibuprofen to control pain, unless your healthcare provider prescribed other pain medicine. If you have chronic conditions like diabetes, liver or kidney disease, stomach ulcers, or gastrointestinal bleeding, or are  taking blood thinners, talk with your healthcare provider before taking any medicines.  · Be careful if you are given prescription pain medicines, narcotics, or medicine for muscle spasm. They can cause drowsiness, affect your coordination, reflexes, and judgment. Do not drive or operate heavy machinery while taking these types of medicines. Take prescription pain medicine only as prescribed by your healthcare provider.  Lumbar stretch  Here is a simple stretching exercise that will help relax muscle spasm and keep your back more limber. If exercise makes your back pain worse, dont do it.  · Lie on your back with your knees bent and both feet on the ground.  · Slowly raise your left knee to your chest as you flatten your lower back against the floor. Hold for 5 seconds.  · Relax and repeat the exercise with your right knee.  · Do 10 of these exercises for each leg.  Safe lifting method  · Dont bend over at the waist to lift an object off the floor.  Instead, bend your knees and hips in a squat.   · Keep your back and head upright  · Hold the object close to your body, directly in front of you.  · Straighten your legs to lift the object.   · Lower the object to the floor in the reverse fashion.  · If you must slide something across the floor, push it.  Posture tips  Sitting  Sit in chairs with straight backs or low-back support. Keep your knees lower than your hips, with your feet flat on the floor.  When driving, sit up straight. Adjust the seat forward so you are not leaning toward the steering wheel.  A small pillow or rolled towel behind your lower back may help if you are driving long distances.   Standing  When standing for long periods, shift most of your weight to one leg at a time. Alternate legs every few minutes.   Sleeping  The best way to sleep is on your side with your knees bent. Put a low pillow under your head to support your neck in a neutral spine position. Avoid thick pillows that bend your neck  to one side. Put a pillow between your legs to further relax your lower back. If you sleep on your back, put pillows under your knees to support your legs in a slightly flexed position. Use a firm mattress. If your mattress sags, replace it, or use a 1/2-inch plywood board under the mattress to add support.  Follow-up care  Follow up with your healthcare provider, or as advised.  If X-rays, a CT scan or an MRI scan were taken, they will be reviewed by a radiologist. You will be notified of any new findings that may affect your care.  Call 911  Seek emergency medical care if any of the following occur:  · Trouble breathing  · Confusion  · Very drowsy  · Fainting or loss of consciousness  · Rapid or very slow heart rate  · Loss of  bowel or bladder control  When to seek medical care  Call your healthcare provider if any of the following occur:  · Pain becomes worse or spreads to your arms or legs  · Weakness or numbness in one or both arms or legs  · Numbness in the groin area  Date Last Reviewed: 6/1/2016  © 5008-9622 [employer]. [address] [place] [address]. All rights reserved. This information is not intended as a substitute for professional medical care. Always follow your healthcare professional's instructions.        Caring for Your Back Throughout the Day  Take care of your back throughout the day. You will likely have fewer back problems if you do. Try to warm up before you move. Shift positions often. Also do your best to form healthy habits.    Warm up for the day  Do a few slow, catlike stretches before starting your day. This simple warmup can soften your disks, stretch your back muscles, and help prevent injuries.  Shift positions often  At work and at home, change positions often. This helps keep your body from getting stiff. Stand up or lean back while you sit. If you can, get up and move every 1/2 hour.  Form healthy habits  Here are some suggestions:   · Keep a healthy  weight. When you weigh too much, your back is under excess strain. But losing just a few extra pounds can help a lot.  · Try not to overeat. Learn about serving sizes. The size of a serving depends on the food and the food group. Many foods list serving sizes on the labels.  · Handle minor aches with cold and heat. Apply cold the first 24 to 48 hours. Use heat after that. Always place a thin cloth between your skin and the source of cold or heat.  · Take medicines as directed. This helps keep pain under control. Always read labels, and call your healthcare provider or pharmacist if you have any questions.  Walk each day  A daily walk keeps your back and thigh muscles stretched and strong. This gives your back better support. Be sure to walk with your spines three curves aligned, by keeping your head, hips, and toes connected by a vertical line.   Date Last Reviewed: 10/18/2015  © 1909-6639 [employer]. [address] [place] [address]. All rights reserved. This information is not intended as a substitute for professional medical care. Always follow your healthcare professional's instructions.        Relieving Tension in Your Back  Being relaxed helps keep your mind healthy and your back ready to move. Take short breaks often. Walk around. Stretch. Switch tasks. Also give the following a try.  Make time to relax. Start by setting aside 5 minutes daily.   Deep breathing    Deep breathing is a simple way to reduce stress. You can do it almost any time you need to relax.  · Inhale slowly through your nose. Let your lungs and stomach expand.  · Hold your breath for 2 to 3 seconds.  · Exhale slowly through your mouth until your lungs feel empty. Repeat 3 to 4 times.  Relieve tension  Muscle tension can create tender spots called trigger points. The tips below may help relieve muscle tension.  · Press the trigger point if you can reach it. If not, lie on a soft tennis ball, or ask a friend to press  the spot. Use steady pressure for 10 to 15 seconds. Breathe deeply. Repeat a few times.  · Massage trigger points with ice for 2 to 5 minutes. Press lightly at first. Slowly increase firmness.  Date Last Reviewed: 10/18/2015  © 2085-8780 [employer]. [address] [place] [address]. All rights reserved. This information is not intended as a substitute for professional medical care. Always follow your healthcare professional's instructions.        Back Exercises: Abdominal Lift Brace with Marching    The abdominal lift brace with march strengthens your lower abdominal muscles, helping you keep your pelvis and back stable:  · Lie on the floor with both knees bent. Put your feet flat on the floor and your arms by your sides. Tighten your abdominal muscles. Be sure to continue to breathe.  · Lift one bent knee about 2 inches then return it to the floor and lift the other about 2 inches. Keep your abdominal muscles tight and continue to breathe. These motions should be slow and controlled without your pelvis rocking side to side.  · Repeat 10 times.  Date Last Reviewed: 8/16/2015  © 4574-0054 [employer]. [address] [place] [address]. All rights reserved. This information is not intended as a substitute for professional medical care. Always follow your healthcare professional's instructions.        Back Exercises: Back Press    Do this exercise on your hands and knees. Keep your knees under your hips and your hands under your shoulders. Keep your spine in a neutral position (not arched or sagging). Be sure to maintain your necks natural curve:  · Tighten your stomach and buttock muscles to press your back upward. Let your head drop slightly.  · Hold for 5 seconds. Return to starting position.  · Repeat 5 times.  Date Last Reviewed: 10/11/2015  © 5072-8401 [employer]. [address] [place] [address]. All rights reserved. This information is not  intended as a substitute for professional medical care. Always follow your healthcare professional's instructions.        Back Exercises: Leg Pull    To start, lie on your back with your knees bent and feet flat on the floor. Dont press your neck or lower back to the floor. Breathe deeply. You should feel comfortable and relaxed in this position.  · Pull one knee to your chest.  · Hold for 30 to 60 seconds. Return to starting position.  · Repeat 2 times.  · Switch legs.  · For a double leg pull, pull both legs to your chest at the same time. Repeat 2 times.  For your safety, check with your healthcare provider before starting an exercise program.   Date Last Reviewed: 8/16/2015  © 2029-2545 [employer]. [address] [place] [address]. All rights reserved. This information is not intended as a substitute for professional medical care. Always follow your healthcare professional's instructions.        Back Exercises: Lower Back Rotation    To start, lie on your back with your knees bent and feet flat on the floor. Dont press your neck or lower back to the floor. Breathe deeply. You should feel comfortable and relaxed in this position.  · Drop both knees to one side. Turn your head to the other side. Keep your shoulders flat on the floor.  · Do not push through pain.  · Hold for 20 seconds.  · Slowly switch sides.  · Repeat 2 to 5 times.  Date Last Reviewed: 10/11/2015  © 8917-5424 [employer]. [address] [place] [address]. All rights reserved. This information is not intended as a substitute for professional medical care. Always follow your healthcare professional's instructions.        Back Exercises: Lower Back Stretch    To start, sit in a chair with your feet flat on the floor. Shift your weight slightly forward. Relax, and keep your ears, shoulders, and hips aligned.  · Sit with your feet well apart.  · Bend forward and touch the floor with the backs of your  hands. Relax and let your body drop.  · Hold for 20 seconds. Return to starting position.  · Repeat 2 times.   Date Last Reviewed: 8/16/2015  © 0770-0594 [employer]. [address] [place] [address]. All rights reserved. This information is not intended as a substitute for professional medical care. Always follow your healthcare professional's instructions.        Back Exercises: Seated Rotation    To start, sit in a chair with your feet flat on the floor. Shift your weight slightly forward to avoid rounding your back. Relax, and keep your ears, shoulders, and hips aligned:  · Fold your arms and elbows just below shoulder height.  · Turn from the waist with hips forward. Turn your head last. Do not push through the pain.  · Hold for a count of 10 to 30. Return to starting position.  · Repeat 3 to 5 times on one side. Then switch sides.  Date Last Reviewed: 10/11/2015  © 0010-1603 [employer]. [address] [place] [address]. All rights reserved. This information is not intended as a substitute for professional medical care. Always follow your healthcare professional's instructions.

## 2019-12-10 NOTE — PROGRESS NOTES
PM&R NEW PATIENT HISTORY & PHYSICAL :    Referring Physician: Giovanny    Chief Complaint   Patient presents with    Back Pain       HPI: This is a 44 y.o.  male being seen in clinic today for evaluation of mid to low back achy pain and tightness that has increasingly bothered him over the past few weeks.  He feels stiffness and tightness after periods of inactivity.  Once he is moving around, he has some relief.  Steroids provided temporary relief.  He had some worsening of back pain after doing dead lifts for new physical test.    History obtained from patient    Functional History:  Walking: Not limited  Transfers: Independent  Assistive devices: No  Power mobility: No  Falls: None   Directional preference:  Employment status:    Needs help with:  Nothing - all ADLS normal    Cooking   Cleaning  Bathing   Dressing   Toileting     Past family, medical, social, and surgical history reviewed in chart    Review of Systems:     General- denies lethargy, weight change, fever, chills  Head/neck- denies swallowing difficulties  ENT- denies hearing changes  Cardiovascular-denies chest pain  Pulmonary- denies shortness of breath  GI- denies constipation or bowel incontinence  - denies bladder incontinence +CKD  Skin- denies wounds or rashes  Musculoskeletal- denies weakness, +pain  Neurologic- denies numbness and tingling  Psychiatric- denies depressive or psychotic features, denies anxiety  Lymphatic-denies swelling  Endocrine- denies hypoglycemic symptoms/DM history  All other pertinent systems negative     Physical Examination:  General: Well developed, well nourished male, NAD  HEENT:NCAT EOMI bilaterally   Pulmonary:Normal respirations    Spinal Examination: CERVICAL  Active ROM is within normal limits.  Inspection: No deformity of spinal alignment.    Spinal Examination: LUMBAR or THORACIC  Active ROM is within normal limits except limited in full flex and ext due to tightness  Inspection: No deformity of  spinal alignment.  No palpable olisthesis.  Palpation: No vertebral tenderness to percussion.  Tight and tender along lumbar paraspinals, mild ttp at si joints  PAULINE: negative  Facet loading +bilaterally  SLR Test (seated and supine):negative to greater than 70 degrees bilaterally  Able to stand on heels and toes  Trendelenburg test: negative    Bilateral Upper and Lower Extremities:  Pulses are 2+ at radial, bilaterally.  Shoulder/Elbow/Wrist/Hand ROM   Hip/Knee/Ankle ROM wnl  Bilateral Extremities show normal capillary refill.  No signs of cyanosis, rubor, edema, skin changes, or dysvascular changes of appendages.  Nails appear intact.    Neurological Exam:  Cranial Nerves:  II-XII grossly intact    Manual Muscle Testing: (Motor 5=normal)  RIGHT Lower extremity: Hip flexion 5/5, Hip Abduction 5/5, Hip Adduction 5/5, Knee extension 5/5, Knee flexion 5/5, Ankle dorsiflexion 5/5, Extensor hallucis longus 5/5, Ankle plantarflexion 5/5  LEFT Lower extremity:  Hip flexion 5/5, Hip Abduction 5/5,Hip Adduction 5/5, Knee extension 5/5, Knee flexion 5/5, Ankle dorsiflexion 5/5, Extensor hallucis longus 5/5, Ankle plantarflexion 5/5    No focal atrophy is noted of either lower extremity.    Bilateral Reflexes: 1+patellar  No clonus at knee or ankle.    Sensation: tested to light touch  - intact in legs   Gait: Narrow base and good arm swing.      IMPRESSION/PLAN: This is a 44 y.o.  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 Xray lumbar spine  2. Rx for PT-here-Core and hip strengthening, myofascial release, ROM, stretch, modalities, HEP  3. Ice/heat modalities prn  4. Handouts on back care, exercise, stretch, etc  5. Fu prn    Helena Rivera M.D.  Physical Medicine and Rehab

## 2019-12-10 NOTE — LETTER
December 10, 2019      Tommie Baltazar MD  [address] [facility]  [place] [address]           [facility]  [address] [facility] [facility] [place] [address]  Phone: [phone]  Fax: [phone]          Patient: Elder Hernandez   MR Number: [id number]   YOB: 1975   Date of Visit: 12/10/2019       Dear Dr. Tommie Baltazar:    Thank you for referring Elder Hernandez to me for evaluation. Attached you will find relevant portions of my assessment and plan of care.    If you have questions, please do not hesitate to call me. I look forward to following Elder Hernandez along with you.    Sincerely,    Helena Rivera MD    Enclosure  CC:  No Recipients    If you would like to receive this communication electronically, please contact [email or web address] or [phone] to request more information on [employer] access.    For providers and/or their staff who would like to refer a patient to [facility], please contact us through our one-stop-shop provider referral line, [facility], at [phone].    If you feel you have received this communication in error or would no longer like to receive these types of communications, please e-mail [email or web address]

## 2019-12-16 NOTE — PROGRESS NOTES
Digital Medicine: Health  Follow-Up    The history is provided by the patient.     HYPERTENSION    Patient's BP goal is 130/80.Patient's BP average is 139/87 mmHg, which is above goal, per 2017 ACC/AHA Hypertension Guidelines.          INTERVENTION(S)  encouragement/support and denied questions    Patient stated he has no goals he wants to pursue at this time. Will follow up with patient in a month.       There are no preventive care reminders to display for this patient.    Last 5 Patient Entered Readings                                      Current 30 Day Average: 139/87     Recent Readings 12/10/2019 12/9/2019 12/9/2019 12/6/2019 12/5/2019    SBP (mmHg) 137 143 148 144 135    DBP (mmHg) 90 92 92 84 90    Pulse 101 76 66 71 76                      Diet Screening   Patient reports eating or drinking the following: juice, soda, water and fresh vegetablesHe wife.      Patient stated his diet varies. He usually skips breakfast. He states he eats lunch and has a big dinner. Patient stated his wife usually cooks a dish composed of a carb, protein and vegetable. His wife seasons their food with salt, pepper and season all. Patient consumes between a half a gallon of water to a gallon of water a day.     Intervention(s): low sodium diet education, reducing sodium intake and reading food labels    Physical Activity Screening   When asked if exercising, patient responded: yesHis level of intensity when exercising is moderate.    Patient participates in the following activities: weights    3 times a week Lifts weights.        SDOH

## 2019-12-19 ENCOUNTER — OFFICE VISIT (OUTPATIENT)
Dept: PODIATRY | Facility: CLINIC | Age: 44
End: 2019-12-19
Payer: OTHER GOVERNMENT

## 2019-12-19 ENCOUNTER — PATIENT OUTREACH (OUTPATIENT)
Dept: OTHER | Facility: OTHER | Age: 44
End: 2019-12-19

## 2019-12-19 ENCOUNTER — HOSPITAL ENCOUNTER (OUTPATIENT)
Dept: RADIOLOGY | Facility: HOSPITAL | Age: 44
Discharge: HOME | End: 2019-12-19
Attending: PODIATRIST
Payer: OTHER GOVERNMENT

## 2019-12-19 VITALS
HEIGHT: 67 IN | WEIGHT: 223.13 LBS | DIASTOLIC BLOOD PRESSURE: 93 MMHG | BODY MASS INDEX: 35.02 KG/M2 | HEART RATE: 83 BPM | SYSTOLIC BLOOD PRESSURE: 136 MMHG

## 2019-12-19 DIAGNOSIS — M72.2 PLANTAR FASCIITIS: Primary | ICD-10-CM

## 2019-12-19 DIAGNOSIS — M79.672 FOOT PAIN, BILATERAL: ICD-10-CM

## 2019-12-19 DIAGNOSIS — M79.671 FOOT PAIN, BILATERAL: Primary | ICD-10-CM

## 2019-12-19 DIAGNOSIS — M79.671 INFLAMMATORY HEEL PAIN, RIGHT: ICD-10-CM

## 2019-12-19 DIAGNOSIS — M79.672 FOOT PAIN, BILATERAL: Primary | ICD-10-CM

## 2019-12-19 DIAGNOSIS — M24.573 EQUINUS CONTRACTURE OF ANKLE: ICD-10-CM

## 2019-12-19 DIAGNOSIS — M79.671 FOOT PAIN, BILATERAL: ICD-10-CM

## 2019-12-19 PROCEDURE — 99213 PR OFFICE/OUTPT VISIT, EST, LEVL III, 20-29 MIN: ICD-10-PCS | Mod: S$PBB,,, | Performed by: PODIATRIST

## 2019-12-19 PROCEDURE — 99999 PR PBB SHADOW E&M-EST. PATIENT-LVL III: CPT | Mod: PBBFAC,,, | Performed by: PODIATRIST

## 2019-12-19 PROCEDURE — 73630 X-RAY EXAM OF FOOT: CPT | Mod: 50,TC

## 2019-12-19 PROCEDURE — 73630 XR FOOT COMPLETE 3 VIEW BILATERAL: ICD-10-PCS | Mod: 26,50,, | Performed by: RADIOLOGY

## 2019-12-19 PROCEDURE — 99999 PR PBB SHADOW E&M-EST. PATIENT-LVL III: ICD-10-PCS | Mod: PBBFAC,,, | Performed by: PODIATRIST

## 2019-12-19 PROCEDURE — 73630 X-RAY EXAM OF FOOT: CPT | Mod: 26,50,, | Performed by: RADIOLOGY

## 2019-12-19 PROCEDURE — 99213 OFFICE O/P EST LOW 20 MIN: CPT | Mod: PBBFAC,25 | Performed by: PODIATRIST

## 2019-12-19 PROCEDURE — 99213 OFFICE O/P EST LOW 20 MIN: CPT | Mod: S$PBB,,, | Performed by: PODIATRIST

## 2019-12-19 RX ORDER — METHYLPREDNISOLONE 4 MG/1
TABLET ORAL
Qty: 1 PACKAGE | Refills: 0 | Status: SHIPPED | OUTPATIENT
Start: 2019-12-19 | End: 2020-03-06

## 2019-12-19 NOTE — PROGRESS NOTES
Digital Medicine: Clinician Introduction    Elder Hernandez is a 44 y.o. male who is newly enrolled in the Digital Medicine Clinic.    The following information was reviewed and updated:  Preferred pharmacy   [facility] [address]  [place] [address]  Phone: [phone] Fax: [phone]      Patient prefers a 90 days supply.     Review of patient's allergies indicates:  No Known Allergies    The history is provided by the patient.     HYPERTENSION  Our goal is to get BP to consistently below 130/80mmHg and make the process convenient so patient can avoid extra trips to the office. Getting your blood pressure below 130/80mmHg (definition of control) will reduce your risk for heart attack, kidney failure, stroke and death (as well as kidney failure, eye disease, & dementia)      Reviewed non-pharmacologic therapies and impact on BP      Explained that we expect patient to obtain several blood pressures per week at random times of day.  Instructed patient not to allow anyone else to use phone and monitoring device.  Confirmed appropriate BP monitoring technique.      Explained to patient that the digital medicine team is not available for emergencies.  Patient will call [facility] on-call ([phone] or [phone]) or [phone] if needed.    Patient's BP goal is 130/80. Patients BP average is 138/87 mmHg, which is above goal, per 2017 ACC/AHA Hypertension Guidelines.    Doesn't identify any lifestyle contributions he is working on to control his blood pressure. Patient is unaware of sodium restriction goal of <2000 mg. He states he makes most of his own food, but is not reading nutrition labels or using lower sodium options in preparation.     Also discussed technique in detail. Patient is not resting prior to taking readings.     BP average is slightly above goal. Encouraged patient to take resting reading sand work on modifiable contributions. If goal is not achieved by next  outreach, will consider increase in amlodipine dose.       Med Review complete.    Allergies reviewed.      Last 5 Patient Entered Readings                                      Current 30 Day Average: 138/87     Recent Readings 12/19/2019 12/17/2019 12/10/2019 12/9/2019 12/9/2019    SBP (mmHg) 132 134 137 143 148    DBP (mmHg) 84 87 90 92 92    Pulse 100 102 101 76 66          INTERVENTION(S)  reviewed appropriate dose schedule, recommended diet modifications and reviewed monitoring technique    PLAN  patient verbalizes understanding, Health  follow up and continue monitoring    Continue to work with health  on understanding goals, and how to achieve them.   Obtain resting BP readings for proper treatment.   Continue current regimen and monitoring. Will consider amlodipine increase with next outreach if goal is not achieved.       There are no preventive care reminders to display for this patient.    Current Medication Regimen:  Hypertension Medications             amLODIPine (NORVASC) 2.5 MG tablet Take 1 tablet (2.5 mg total) by mouth once daily.    losartan (COZAAR) 25 MG tablet Take 1 tablet (25 mg total) by mouth once daily.    prazosin (MINIPRESS) 2 MG Cap Take 1 capsule (2 mg total) by mouth every evening.        Reviewed the importance of self-monitoring, medication adherence, and that the health  can be used as a resource for lifestyle modifications to help reduce or maintain a healthy lifestyle.    Sent link to [facility] webpages and my contact information via archify for future questions. Follow up scheduled.

## 2019-12-19 NOTE — PROGRESS NOTES
Called patient to complete clinician introduction and assessment. NA, LVM requesting patient call back.     BP is uncontrolled, above goal of < 130/80 mmHg.   Last 5 Patient Entered Readings                                      Current 30 Day Average: 138/87     Recent Readings 12/19/2019 12/17/2019 12/10/2019 12/9/2019 12/9/2019    SBP (mmHg) 132 134 137 143 148    DBP (mmHg) 84 87 90 92 92    Pulse 100 102 101 76 66          Plan to assess technique and medication compliance. Per chart review, patient with pain, just started PT.

## 2019-12-19 NOTE — PROGRESS NOTES
Subjective:     Patient ID: Elder Hernandez is a 44 y.o. male.    Chief Complaint: Foot Pain (bilateral bone spurs, R>L, rates pain 9/10 while walking and standing, nondiabetic pt, wears  boots with socks, PCP Dr. Baltazar 12/02/19)    Elder is a 44 y.o. male who presents to the clinic complaining of heel pain in both feet(right>left), especially with the first step in the morning. The pain is described as Aching and Tight. The onset of the pain was gradual and has worsened over the past several weeks. Elder rates the pain as 9/10. He denies a history of trauma. Prior treatments include stretching exercises and inserts with minimal relief.     Patient Active Problem List   Diagnosis    PTSD (post-traumatic stress disorder)    Irritable bowel syndrome with diarrhea    Chronic kidney disease    Migraine with aura and without status migrainosus, not intractable    Erectile dysfunction    HSV-2 infection    HTN (hypertension)    Benign prostatic hyperplasia without lower urinary tract symptoms    Arthritis of both knees    Gout    Bilateral foot pain    FSGS (focal segmental glomerulosclerosis)    Rhabdomyolysis    Lower abdominal pain    Diarrhea    Abdominal pain    Colon polyp       Medication List with Changes/Refills   Current Medications    ALLOPURINOL (ZYLOPRIM) 300 MG TABLET    Take 1/2 tablet by mouth once daily.    AMLODIPINE (NORVASC) 2.5 MG TABLET    Take 1 tablet (2.5 mg total) by mouth once daily.    DICYCLOMINE (BENTYL) 20 MG TABLET    Take 1 tablet (20 mg total) by mouth 3 (three) times daily as needed (abdominal pain).    LOSARTAN (COZAAR) 25 MG TABLET    Take 1 tablet (25 mg total) by mouth once daily.    MELOXICAM (MOBIC) 15 MG TABLET    Take 1 tablet (15 mg total) by mouth once daily. For pain and inflammation    METHOCARBAMOL (ROBAXIN) 750 MG TAB    Take one or two tablets by mouth three times daily as needed for muscle spasm.    MULTIVITAMIN ORAL    Take 1  tablet by mouth once daily.    PRAZOSIN (MINIPRESS) 2 MG CAP    Take 1 capsule (2 mg total) by mouth every evening.    RIZATRIPTAN (MAXALT-MLT) 10 MG DISINTEGRATING TABLET    Take 1 tablet (10 mg total) by mouth daily as needed for Migraine. May repeat in 2 hours if needed    SERTRALINE (ZOLOFT) 100 MG TABLET    Take 1 tablet (100 mg total) by mouth once daily.    SILDENAFIL (VIAGRA) 100 MG TABLET    Take 1 tablet (100 mg total) by mouth daily as needed for Erectile Dysfunction.    TOPIRAMATE (TOPAMAX) 25 MG TABLET    Take 1 tablet (25 mg total) by mouth 2 (two) times daily. take one tablet at night for first week.    TRAZODONE (DESYREL) 150 MG TABLET    Take 1 tablet (150 mg total) by mouth every evening.    VALACYCLOVIR (VALTREX) 500 MG TABLET    Take 1 tablet (500 mg total) by mouth 2 (two) times daily as needed.   Changed and/or Refilled Medications    Modified Medication Previous Medication    METHYLPREDNISOLONE (MEDROL DOSEPACK) 4 MG TABLET methylPREDNISolone (MEDROL DOSEPACK) 4 mg tablet       use as directed    use as directed       Review of patient's allergies indicates:  No Known Allergies    Past Surgical History:   Procedure Laterality Date    COLONOSCOPY N/A 10/8/2019    Procedure: COLONOSCOPY;  Surgeon: Niraj Grey MD;  Location: [facility];  Service: Endoscopy;  Laterality: N/A;    RENAL BIOPSY Right 2015    SHOULDER SURGERY Left 2015       History reviewed. No pertinent family history.    Social History     Socioeconomic History    Marital status:      Spouse name: Not on file    Number of children: 4    Years of education: Not on file    Highest education level: Not on file   Occupational History    Occupation: FitnessManager   Social Needs    Financial resource strain: Not on file    Food insecurity:     Worry: Not on file     Inability: Not on file    Transportation needs:     Medical: Not on file     Non-medical: Not on file   Tobacco Use    Smoking status: Never Smoker     "Smokeless tobacco: Never Used   Substance and Sexual Activity    Alcohol use: Yes     Comment: socially    Drug use: Never    Sexual activity: Yes     Partners: Female     Birth control/protection: None   Lifestyle    Physical activity:     Days per week: Not on file     Minutes per session: Not on file    Stress: Rather much   Relationships    Social connections:     Talks on phone: Not on file     Gets together: Not on file     Attends Taoist service: Not on file     Active member of club or organization: Not on file     Attends meetings of clubs or organizations: Not on file     Relationship status: Not on file   Other Topics Concern    Not on file   Social History Narrative    Not on file       Vitals:    12/19/19 1538   BP: (!) 136/93   Pulse: 83   Weight: 101.2 kg (223 lb 1.7 oz)   Height: 5' 7" (1.702 m)   PainSc:   6   PainLoc: Foot       Review of Systems   Constitutional: Negative for chills and fever.   Respiratory: Negative for shortness of breath.    Cardiovascular: Negative for chest pain, palpitations, orthopnea, claudication and leg swelling.   Gastrointestinal: Negative for diarrhea, nausea and vomiting.   Musculoskeletal: Negative for joint pain.   Skin: Negative for rash.   Neurological: Negative for dizziness, tingling, sensory change, focal weakness and weakness.   Psychiatric/Behavioral: Negative.            Objective:   PHYSICAL EXAM: Apperance: Alert and orient in no distress,well developed, and with good attention to grooming and body habits  Patient presents ambulating in army boots with inserts.   Lower Extremity Exam  VASCULAR: Dorsalis pedis pulses 2/4 bilateral and Posterior Tibial pulses 2/4 bilateral. Capillary fill time <4 seconds bilateral. No edema observed bilateral . Varicosities absent bilateral. Skin temperature of the lower extremities is warm to warm, proximal to distal. Hair growth WNLbilateral.  DERMATOLOGICAL: No skin rashes, subcutaneous nodules, lesions, or " ulcers observed bilateral.   NEUROLOGICAL: Light touch, sharp-dull, proprioception all present and equal bilaterally.   MUSCULOSKELETAL:Muscle strength is 5/5 for foot inverters, everters, plantarflexors, and dorsiflexors. Muscle tone is normal. Ankle joints bilateral shows decreased ROM. bilateral ankle ROM shows greater decrease in dorsiflexion with knee extended. Ankle joint ROM is pain free and without crepitus bilateral. Pain to palpation right plantar medial tubercle. Plantar medial aspect of bilateral heels shows tenderness to palpation. No pain on medial-lateral compression of the calcaneus.     TEST RESULTS: Radiographs of bilateral foot/ankle taken reveals Right: There is no radiographic evidence of acute osseous, articular, or soft tissue abnormality.  Joint spaces are preserved.  Left: There is no radiographic evidence of acute osseous, articular, or soft tissue abnormality.  Joint spaces are preserved.            Assessment:   The following prescriptions/orders were written today per listed diagnosis  Plantar fasciitis  -     methylPREDNISolone (MEDROL DOSEPACK) 4 mg tablet; use as directed  Dispense: 1 Package; Refill: 0    Equinus contracture of ankle  -     methylPREDNISolone (MEDROL DOSEPACK) 4 mg tablet; use as directed  Dispense: 1 Package; Refill: 0    Inflammatory heel pain, right  -     methylPREDNISolone (MEDROL DOSEPACK) 4 mg tablet; use as directed  Dispense: 1 Package; Refill: 0          Plan:   Plantar fasciitis  -     methylPREDNISolone (MEDROL DOSEPACK) 4 mg tablet; use as directed  Dispense: 1 Package; Refill: 0    Equinus contracture of ankle  -     methylPREDNISolone (MEDROL DOSEPACK) 4 mg tablet; use as directed  Dispense: 1 Package; Refill: 0    Inflammatory heel pain, right  -     methylPREDNISolone (MEDROL DOSEPACK) 4 mg tablet; use as directed  Dispense: 1 Package; Refill: 0      I counseled the patient on his conditions, regarding findings of my examination, my impressions, and  usual treatment plan.   Reviewed bilateral foot x-rays in exam room with patient.   I explained to the patient that etiology and treatment options for heel pain including rest,  ice messages, stretching exercises, strappings/tappings, NSAID's, injections, new shoegear with orthotic inserts, and/or surgical treatment.   Patient agreed to oral medication today  I gave written and verbal instructions on heel cord stretching and this was demonstrated for the patient. Patient expressed understanding.  Prescribed Medrol Dosepak to be taken as directed on package. Discussed possible increase in blood sugar with taking steroid medication. Patient advised on the possible elevation of blood pressure sugar and caution to take pills as prescribed and to discontinue use if symptoms arise, patient agreed.   Patient instructed on adequate icing techniques. Patient should ice the affected area at least once per day x 10 minutes for 10 days . I advised the  patient that extra icing would also be beneficial to ensure adequate anti inflammatory effect.   The patient and I reviewed the types of shoes he should be wearing, my recommendation includes generally the best time of the day for a shoe fitting is the afternoon, shoes with a wide toe box, very good cushion, and tennis shoes with removable inner soles. The patient and I reviewed my recommendations for over-the-counter orthotic inserts.   Patient to return in 3 weeks.                   Pradeep Da Silva DPM  [facility]

## 2019-12-26 ENCOUNTER — OFFICE VISIT (OUTPATIENT)
Dept: URGENT CARE | Facility: CLINIC | Age: 44
End: 2019-12-26
Payer: OTHER GOVERNMENT

## 2019-12-26 ENCOUNTER — HOSPITAL ENCOUNTER (OUTPATIENT)
Dept: RADIOLOGY | Facility: CLINIC | Age: 44
Discharge: HOME | End: 2019-12-26
Attending: NURSE PRACTITIONER
Payer: OTHER GOVERNMENT

## 2019-12-26 VITALS
HEIGHT: 67 IN | RESPIRATION RATE: 20 BRPM | TEMPERATURE: 99 F | HEART RATE: 69 BPM | SYSTOLIC BLOOD PRESSURE: 157 MMHG | DIASTOLIC BLOOD PRESSURE: 97 MMHG | WEIGHT: 224.13 LBS | BODY MASS INDEX: 35.18 KG/M2 | OXYGEN SATURATION: 97 %
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
## 2019-12-26 DIAGNOSIS — M25.521 BILATERAL ELBOW JOINT PAIN: ICD-10-CM

## 2019-12-26 DIAGNOSIS — S86.912A MUSCLE STRAIN OF LEFT LOWER EXTREMITY, INITIAL ENCOUNTER: ICD-10-CM

## 2019-12-26 DIAGNOSIS — M25.522 BILATERAL ELBOW JOINT PAIN: ICD-10-CM

## 2019-12-26 DIAGNOSIS — M25.521 BILATERAL ELBOW JOINT PAIN: Primary | ICD-10-CM

## 2019-12-26 DIAGNOSIS — M25.522 BILATERAL ELBOW JOINT PAIN: Primary | ICD-10-CM

## 2019-12-26 PROCEDURE — 99214 PR OFFICE/OUTPT VISIT, EST, LEVL IV, 30-39 MIN: ICD-10-PCS | Mod: S$GLB,,, | Performed by: NURSE PRACTITIONER

## 2019-12-26 PROCEDURE — 99214 OFFICE O/P EST MOD 30 MIN: CPT | Mod: S$GLB,,, | Performed by: NURSE PRACTITIONER

## 2019-12-26 PROCEDURE — 73070 X-RAY EXAM OF ELBOW: CPT | Mod: RT,S$GLB,, | Performed by: RADIOLOGY

## 2019-12-26 PROCEDURE — 73070 X-RAY EXAM OF ELBOW: CPT | Mod: LT,S$GLB,, | Performed by: RADIOLOGY

## 2019-12-26 PROCEDURE — 73070 XR ELBOW 2 VIEWS RIGHT: ICD-10-PCS | Mod: RT,S$GLB,, | Performed by: RADIOLOGY

## 2019-12-26 RX ORDER — IBUPROFEN 800 MG/1
800 TABLET ORAL EVERY 8 HOURS PRN
Qty: 30 TABLET | Refills: 0 | Status: SHIPPED | OUTPATIENT
Start: 2019-12-26 | End: 2019-12-26

## 2019-12-26 RX ORDER — TIZANIDINE HYDROCHLORIDE 4 MG/1
1 CAPSULE, GELATIN COATED ORAL EVERY 6 HOURS PRN
Qty: 20 CAPSULE | Refills: 0 | Status: SHIPPED | OUTPATIENT
Start: 2019-12-26 | End: 2019-12-31

## 2019-12-27 NOTE — PROGRESS NOTES
"Subjective:       Patient ID: Elder Hernandez is a 44 y.o. male.    Vitals:  height is 5' 7" (1.702 m) and weight is 101.6 kg (224 lb 1.6 oz). His oral temperature is 98.6 °F (37 °C). His blood pressure is 157/97 (abnormal) and his pulse is 69. His respiration is 20 and oxygen saturation is 97%.     Chief Complaint: Elbow Injury and Leg Pain    Elbow Injury   This is a new problem. The current episode started today. The problem occurs constantly. The problem has been unchanged. Associated symptoms include arthralgias and joint swelling. Pertinent negatives include no abdominal pain, anorexia, change in bowel habit, chest pain, chills, congestion, coughing, diaphoresis, fatigue, fever, headaches, myalgias, nausea, neck pain, numbness, rash, sore throat, swollen glands, urinary symptoms, vertigo, visual change, vomiting or weakness. The symptoms are aggravated by bending. He has tried nothing for the symptoms. The treatment provided no relief.   Leg Pain    The incident occurred 1 to 3 hours ago. The injury mechanism was a fall. The pain is present in the left thigh. The quality of the pain is described as aching. The pain is at a severity of 10/10. The pain is severe. The pain has been constant since onset. Pertinent negatives include no inability to bear weight, loss of motion, loss of sensation, muscle weakness, numbness or tingling. He reports no foreign bodies present. The symptoms are aggravated by movement, weight bearing and palpation. He has tried nothing for the symptoms. The treatment provided no relief.       Constitution: Negative for chills, sweating, fatigue and fever.   HENT: Negative for facial swelling, facial trauma, congestion and sore throat.    Neck: Negative for neck pain and neck stiffness.   Cardiovascular: Negative for chest trauma and chest pain.   Eyes: Negative for eye trauma, double vision and blurred vision.   Respiratory: Negative for cough.    Gastrointestinal: Negative for " abdominal trauma, abdominal pain, nausea, vomiting and rectal bleeding.   Endocrine: negative.   Genitourinary: Negative for hematuria, genital trauma and pelvic pain.   Musculoskeletal: Positive for pain, trauma, joint pain, joint swelling, abnormal ROM of joint and pain with walking. Negative for muscle ache.   Skin: Negative for color change, rash, wound, abrasion and laceration.   Allergic/Immunologic: Negative.    Neurological: Negative for dizziness, history of vertigo, light-headedness, coordination disturbances, headaches, altered mental status, loss of consciousness and numbness.   Hematologic/Lymphatic: Negative for history of bleeding disorder.   Psychiatric/Behavioral: Negative for altered mental status.       Objective:      Physical Exam   Constitutional: He is oriented to person, place, and time. Vital signs are normal. He appears well-developed and well-nourished. He is active and cooperative. No distress.   HENT:   Head: Normocephalic and atraumatic.   Nose: Nose normal.   Mouth/Throat: Oropharynx is clear and moist and mucous membranes are normal.   Eyes: Conjunctivae and lids are normal.   Neck: Trachea normal, normal range of motion, full passive range of motion without pain and phonation normal. Neck supple.   Cardiovascular: Normal rate, regular rhythm, normal heart sounds, intact distal pulses and normal pulses.   Pulmonary/Chest: Effort normal and breath sounds normal.   Abdominal: Soft. Normal appearance and bowel sounds are normal. He exhibits no abdominal bruit, no pulsatile midline mass and no mass.   Musculoskeletal: He exhibits no edema or deformity.        Right elbow: Decreased range of motion: pain with ROM. Tenderness found.        Left elbow: Decreased range of motion: pain with ROM. Tenderness found.   Tenderness with palpation of medial aspect of left upper leg   Neurological: He is alert and oriented to person, place, and time. He has normal strength and normal reflexes. No  sensory deficit.   Skin: Skin is warm, dry, intact and not diaphoretic.   Psychiatric: He has a normal mood and affect. His speech is normal and behavior is normal. Judgment and thought content normal. Cognition and memory are normal.   Nursing note and vitals reviewed.        Assessment:       1. Bilateral elbow joint pain    2. Fall, initial encounter    3. Muscle strain of left lower extremity, initial encounter        Plan:         Bilateral elbow joint pain  -     XR ELBOW 2 VIEWS LEFT; Future; Expected date: 12/26/2019  -     XR ELBOW 2 VIEWS RIGHT; Future; Expected date: 12/26/2019    Fall, initial encounter  -     XR ELBOW 2 VIEWS LEFT; Future; Expected date: 12/26/2019  -     XR ELBOW 2 VIEWS RIGHT; Future; Expected date: 12/26/2019    Muscle strain of left lower extremity, initial encounter            MDM: Originally sent ibuprofen to pharmacy. Instructed pt to take tylenol instead due to kidney function.    X-ray Lumbar Spine Complete 5 View    Result Date: 12/10/2019  EXAMINATION: XR LUMBAR SPINE COMPLETE 5 VIEW CLINICAL HISTORY: lumbar facet arthropathy;Spondylosis without myelopathy or radiculopathy, lumbar region TECHNIQUE: AP, lateral, spot and bilateral oblique views of the lumbar spine were preformed. COMPARISON: None FINDINGS: Vertebral body heights and alignment are within normal limits.  Intervertebral disk spaces are well preserved. Posterior elements appear grossly intact.  No pars defects visualized.  No acute fractures or subluxations are demonstrated.  The remaining visualized osseous and soft tissue structures demonstrate no appreciable abnormality.     No acute findings Electronically signed by: Yusuf Chapa MD Date:    12/10/2019 Time:    09:51    Xr Elbow 2 Views Left    Result Date: 12/26/2019  EXAMINATION: TWO VIEWS OF THE BILATERAL ELBOWS CLINICAL HISTORY: Pain in right elbow TECHNIQUE: AP and lateral view of the both elbows COMPARISON: None. FINDINGS: Two views of the right  elbow demonstrate no acute fracture or dislocation.  On the lateral radiograph, there appears to be soft tissue swelling in the olecranon bursal region with a few tiny calcifications. Two views of the left elbow demonstrate no acute fracture or dislocation.  On the lateral radiograph, there is minimal enthesopathy at the triceps tendon insertion and soft tissue swelling within the olecranon bursal region with a 4 x 2.2 mm dystrophic calcification within the bursa or distal triceps tendon mineralization.     No acute bony abnormality.  As above described. Electronically signed by: Sveta Monroe Date:    12/26/2019 Time:    19:09    Xr Elbow 2 Views Right    Result Date: 12/26/2019  EXAMINATION: TWO VIEWS OF THE BILATERAL ELBOWS CLINICAL HISTORY: Pain in right elbow TECHNIQUE: AP and lateral view of the both elbows COMPARISON: None. FINDINGS: Two views of the right elbow demonstrate no acute fracture or dislocation.  On the lateral radiograph, there appears to be soft tissue swelling in the olecranon bursal region with a few tiny calcifications. Two views of the left elbow demonstrate no acute fracture or dislocation.  On the lateral radiograph, there is minimal enthesopathy at the triceps tendon insertion and soft tissue swelling within the olecranon bursal region with a 4 x 2.2 mm dystrophic calcification within the bursa or distal triceps tendon mineralization.     No acute bony abnormality.  As above described. Electronically signed by: Sveta Monroe Date:    12/26/2019 Time:    19:09      · Rest your affected extremity as much as possible.  · Keep extremity elevated when possible.  · Apply ice packs/frozen peas to affected site four times daily for 15-20 minutes each time.  · May apply ace wrap as needed for support of injured extremity and compression to reduce swelling.   · Follow up with your primary care provider if symptoms do not improve within a few days or sooner for any worsening.   · Go to the ER  immediately for any numbness, weakness, tingling, color change, sudden pain and swelling, or for any other new and concerning symptoms.

## 2019-12-27 NOTE — PATIENT INSTRUCTIONS

## 2020-01-07 ENCOUNTER — OFFICE VISIT (OUTPATIENT)
Dept: RHEUMATOLOGY | Facility: CLINIC | Age: 45
End: 2020-01-07
Payer: OTHER GOVERNMENT

## 2020-01-07 ENCOUNTER — OFFICE VISIT (OUTPATIENT)
Dept: INTERNAL MEDICINE | Facility: CLINIC | Age: 45
End: 2020-01-07
Payer: OTHER GOVERNMENT

## 2020-01-07 VITALS
BODY MASS INDEX: 35.91 KG/M2 | SYSTOLIC BLOOD PRESSURE: 132 MMHG | BODY MASS INDEX: 35.84 KG/M2 | HEIGHT: 67 IN | HEART RATE: 77 BPM | DIASTOLIC BLOOD PRESSURE: 86 MMHG | DIASTOLIC BLOOD PRESSURE: 90 MMHG | WEIGHT: 228.38 LBS | HEART RATE: 54 BPM | SYSTOLIC BLOOD PRESSURE: 126 MMHG | WEIGHT: 228.81 LBS | OXYGEN SATURATION: 97 % | HEIGHT: 67 IN | TEMPERATURE: 98 F

## 2020-01-07 DIAGNOSIS — Z71.89 COUNSELING ON HEALTH PROMOTION AND DISEASE PREVENTION: ICD-10-CM

## 2020-01-07 DIAGNOSIS — R74.8 HYPERCKEMIA: ICD-10-CM

## 2020-01-07 DIAGNOSIS — M10.9 URIC ACID ARTHROPATHY: ICD-10-CM

## 2020-01-07 DIAGNOSIS — M70.22 OLECRANON BURSITIS OF BOTH ELBOWS: Primary | ICD-10-CM

## 2020-01-07 DIAGNOSIS — M70.21 OLECRANON BURSITIS OF BOTH ELBOWS: Primary | ICD-10-CM

## 2020-01-07 PROCEDURE — 99214 OFFICE O/P EST MOD 30 MIN: CPT | Mod: S$PBB,,, | Performed by: INTERNAL MEDICINE

## 2020-01-07 PROCEDURE — 96372 THER/PROPH/DIAG INJ SC/IM: CPT | Mod: PBBFAC

## 2020-01-07 PROCEDURE — 99212 OFFICE O/P EST SF 10 MIN: CPT | Mod: S$PBB,,, | Performed by: INTERNAL MEDICINE

## 2020-01-07 PROCEDURE — 99999 PR PBB SHADOW E&M-EST. PATIENT-LVL IV: CPT | Mod: PBBFAC,,, | Performed by: INTERNAL MEDICINE

## 2020-01-07 PROCEDURE — 99213 OFFICE O/P EST LOW 20 MIN: CPT | Mod: PBBFAC | Performed by: INTERNAL MEDICINE

## 2020-01-07 PROCEDURE — 99212 PR OFFICE/OUTPT VISIT, EST, LEVL II, 10-19 MIN: ICD-10-PCS | Mod: S$PBB,,, | Performed by: INTERNAL MEDICINE

## 2020-01-07 PROCEDURE — 99214 OFFICE O/P EST MOD 30 MIN: CPT | Mod: PBBFAC,25,27 | Performed by: INTERNAL MEDICINE

## 2020-01-07 PROCEDURE — 99999 PR PBB SHADOW E&M-EST. PATIENT-LVL III: CPT | Mod: PBBFAC,,, | Performed by: INTERNAL MEDICINE

## 2020-01-07 PROCEDURE — 99999 PR PBB SHADOW E&M-EST. PATIENT-LVL III: ICD-10-PCS | Mod: PBBFAC,,, | Performed by: INTERNAL MEDICINE

## 2020-01-07 PROCEDURE — 99999 PR PBB SHADOW E&M-EST. PATIENT-LVL IV: ICD-10-PCS | Mod: PBBFAC,,, | Performed by: INTERNAL MEDICINE

## 2020-01-07 PROCEDURE — 99214 PR OFFICE/OUTPT VISIT, EST, LEVL IV, 30-39 MIN: ICD-10-PCS | Mod: S$PBB,,, | Performed by: INTERNAL MEDICINE

## 2020-01-07 RX ORDER — BETAMETHASONE SODIUM PHOSPHATE AND BETAMETHASONE ACETATE 3; 3 MG/ML; MG/ML
6 INJECTION, SUSPENSION INTRA-ARTICULAR; INTRALESIONAL; INTRAMUSCULAR; SOFT TISSUE
Status: COMPLETED | OUTPATIENT
Start: 2020-01-07 | End: 2020-01-07

## 2020-01-07 RX ORDER — DICLOFENAC SODIUM 10 MG/G
2 GEL TOPICAL DAILY
Qty: 1 TUBE | Refills: 2 | Status: SHIPPED | OUTPATIENT
Start: 2020-01-07 | End: 2020-05-15 | Stop reason: SDUPTHER

## 2020-01-07 RX ORDER — PREDNISONE 5 MG/1
TABLET ORAL
Qty: 42 TABLET | Refills: 0 | Status: SHIPPED | OUTPATIENT
Start: 2020-01-07 | End: 2020-02-10

## 2020-01-07 RX ADMIN — BETAMETHASONE ACETATE AND BETAMETHASONE SODIUM PHOSPHATE 6 MG: 3; 3 INJECTION, SUSPENSION INTRA-ARTICULAR; INTRALESIONAL; INTRAMUSCULAR; SOFT TISSUE at 10:01

## 2020-01-07 NOTE — PROGRESS NOTES
RHEUMATOLOGY OUTPATIENT CLINIC NOTE    1/7/2020    Attending Rheumatologist: Mannie Holland  Primary Care Provider: Tommie Baltazar MD   Physician Requesting Consultation: No referring provider defined for this encounter.  Chief Complaint/Reason For Consultation:  Follow-up and Pain (Both)    Subjective:       HPI  Elder Hernandez is a 44 y.o. Black or  male hyper CK Gloria comes for follow-up.    Today  Last seen on October.  No features of active rheumatic disorder, recommended for blood work.  Main complaint today is bilateral elbow pain and swelling.  Onset approximately 2 weeks ago after episode of mechanical fall, landing on affected sides.  Refers being ruled out for any acute fractures.  Slowly improving, however symptoms of pain swelling still persists.  History of gout for which he is on allopurinol per PMD.  Denies any recent flares.  Does not have any other significant joint pain or swelling.  Denies precipitation or than to any particular food/beverage intake.  Does not have any weakness, dyspnea, fever, or rash.    Review of Systems   Constitutional: Negative for chills, fever and malaise/fatigue.   Eyes: Negative for pain and redness.   Respiratory: Negative for cough, hemoptysis and shortness of breath.    Cardiovascular: Negative for chest pain and leg swelling.   Gastrointestinal: Negative for abdominal pain, blood in stool and melena.   Genitourinary: Negative for dysuria and hematuria.   Musculoskeletal: Positive for falls and joint pain (Elbows bilaterally.  Onset after mechanical fall.).   Skin: Negative for rash.   Neurological: Negative for tingling, focal weakness and weakness.   Psychiatric/Behavioral: Negative for memory loss. The patient does not have insomnia.      Chronic comorbid conditions affecting medical decision making today:  Past Medical History:   Diagnosis Date    Acid reflux     Gout     Hypertension     Kidney problem     PTSD (post-traumatic  stress disorder)     Sleep apnea    · FSGS  · History of rhabdomyolysis    Past Surgical History:   Procedure Laterality Date    COLONOSCOPY N/A 10/8/2019    Procedure: COLONOSCOPY;  Surgeon: Niraj Grey MD;  Location: [facility];  Service: Endoscopy;  Laterality: N/A;    RENAL BIOPSY Right 2015    SHOULDER SURGERY Left 2015     Family History   Problem Relation Age of Onset    No Known Problems Mother     No Known Problems Father      Social History     Substance and Sexual Activity   Alcohol Use Yes    Comment: socially     Social History     Tobacco Use   Smoking Status Never Smoker   Smokeless Tobacco Never Used     Social History     Substance and Sexual Activity   Drug Use Never       Current Outpatient Medications:     allopurinol (ZYLOPRIM) 300 MG tablet, Take 1/2 tablet by mouth once daily., Disp: 45 tablet, Rfl: 1    amLODIPine (NORVASC) 2.5 MG tablet, Take 1 tablet (2.5 mg total) by mouth once daily., Disp: 90 tablet, Rfl: 1    dicyclomine (BENTYL) 20 mg tablet, Take 1 tablet (20 mg total) by mouth 3 (three) times daily as needed (abdominal pain)., Disp: 90 tablet, Rfl: 0    losartan (COZAAR) 25 MG tablet, Take 1 tablet (25 mg total) by mouth once daily., Disp: 90 tablet, Rfl: 3    meloxicam (MOBIC) 15 MG tablet, Take 1 tablet (15 mg total) by mouth once daily. For pain and inflammation, Disp: 15 tablet, Rfl: 0    methocarbamol (ROBAXIN) 750 MG Tab, Take one or two tablets by mouth three times daily as needed for muscle spasm., Disp: 30 tablet, Rfl: 0    MULTIVITAMIN ORAL, Take 1 tablet by mouth once daily., Disp: , Rfl:     prazosin (MINIPRESS) 2 MG Cap, Take 1 capsule (2 mg total) by mouth every evening., Disp: 90 capsule, Rfl: 3    rizatriptan (MAXALT-MLT) 10 MG disintegrating tablet, Take 1 tablet (10 mg total) by mouth daily as needed for Migraine. May repeat in 2 hours if needed, Disp: 30 tablet, Rfl: 0    sertraline (ZOLOFT) 100 MG tablet, Take 1 tablet (100 mg total) by mouth  "once daily., Disp: 90 tablet, Rfl: 3    sildenafil (VIAGRA) 100 MG tablet, Take 1 tablet (100 mg total) by mouth daily as needed for Erectile Dysfunction., Disp: 12 tablet, Rfl: 0    topiramate (TOPAMAX) 25 MG tablet, Take 1 tablet (25 mg total) by mouth 2 (two) times daily. take one tablet at night for first week., Disp: 180 tablet, Rfl: 0    traZODone (DESYREL) 150 MG tablet, Take 1 tablet (150 mg total) by mouth every evening., Disp: 90 tablet, Rfl: 2    valACYclovir (VALTREX) 500 MG tablet, Take 1 tablet (500 mg total) by mouth 2 (two) times daily as needed., Disp: 30 tablet, Rfl: 0    diclofenac sodium (VOLTAREN) 1 % Gel, Apply 2 grams topically once daily., Disp: 1 Tube, Rfl: 2    methylPREDNISolone (MEDROL DOSEPACK) 4 mg tablet, use as directed (Patient not taking: Reported on 1/7/2020), Disp: 1 Package, Rfl: 0    predniSONE (DELTASONE) 5 MG tablet, Take 4 tablets (20 mg total) by mouth once daily for 4 days, THEN 3 tablets (15 mg total) once daily for 4 days, THEN 2 tablets (10 mg total) once daily for 4 days, THEN 1 tablet (5 mg total) once daily for 4 days, THEN 1 tablet (5 mg total) every other day for 4 days., Disp: 42 tablet, Rfl: 0  No current facility-administered medications for this visit.      Objective:         Vitals:    01/07/20 0949   BP: 132/86   Pulse: (!) 54     Physical Exam   Constitutional: No distress.   Estimated body mass index is 35.77 kg/m² as calculated from the following:    Height as of this encounter: 5' 7" (1.702 m).    Weight as of this encounter: 103.6 kg (228 lb 6.3 oz).    Wt Readings from Last 1 Encounters:  01/07/20 0949 : 103.6 kg (228 lb 6.3 oz)     HENT:   Head: Normocephalic and atraumatic.   Eyes: Conjunctivae are normal. Pupils are equal, round, and reactive to light.   Neck: Normal range of motion.   Cardiovascular: Normal rate and intact distal pulses.    Pulmonary/Chest: Effort normal. No respiratory distress.   Abdominal: Soft. He exhibits no distension. "   Neurological: He is alert. Gait normal.   Muscle strength 5/5 proximally and distally throughout.   Skin: No rash noted. No erythema.     Musculoskeletal: Normal range of motion. He exhibits edema and tenderness (Olecranon bursa bilaterally.).   : strong  R>L olecranon bursa: Swelling++, warmth +.  No erythema.  No synovitis or significant squeeze tenderness otherwise    AROM: intact  PROM: intact    Devices used by patient: none       Reviewed old and all outside pertinent medical records available.    All lab results personally reviewed and interpreted by me.  Lab Results   Component Value Date    WBC 10.23 05/06/2019    HGB 14.2 05/06/2019    HCT 39.6 (L) 05/06/2019    MCV 88 05/06/2019    MCH 31.7 (H) 05/06/2019    MCHC 35.9 05/06/2019    RDW 13.8 05/06/2019     05/06/2019    MPV 11.0 05/06/2019       Lab Results   Component Value Date     10/21/2019     10/21/2019    K 4.2 10/21/2019    K 4.2 10/21/2019     10/21/2019     10/21/2019    CO2 28 10/21/2019    CO2 28 10/21/2019    GLU 79 10/21/2019    GLU 79 10/21/2019    BUN 19 10/21/2019    BUN 19 10/21/2019    CALCIUM 9.4 10/21/2019    CALCIUM 9.4 10/21/2019    PROT 8.2 10/18/2019    ALBUMIN 3.7 10/21/2019    BILITOT 0.5 10/18/2019    AST 43 (H) 10/18/2019    ALKPHOS 85 10/18/2019    ALT 47 (H) 10/18/2019       Lab Results   Component Value Date    COLORU Yellow 10/21/2019    APPEARANCEUA Clear 10/21/2019    SPECGRAV 1.020 10/21/2019    PHUR 8.0 10/21/2019    PROTEINUA 2+ (A) 10/21/2019    KETONESU Negative 10/21/2019    LEUKOCYTESUR Negative 10/21/2019    NITRITE Negative 10/21/2019       Lab Results   Component Value Date    CRP 3.4 10/18/2019       Lab Results   Component Value Date    SEDRATE <2 10/18/2019       Lab Results   Component Value Date    SEDRATE <2 10/18/2019       No components found for: 25OHVITDTOT, 85DRSNXV9, 80WAIEVL8, METHODNOTE    Lab Results   Component Value Date    URICACID 5.6 04/24/2019     No  components found for: TSPOTTB    · CK: 830 (5/2019) -> 543 (8/2019)-> 707 (10/2019)  · Aldolase within reference value    Rheum Labs:   MARINA negative   RNP antibody negative   Myomarker panel 3 negative     Infectious Labs:   HIV NR     Imaging:  All imaging reviewed and independently  interpreted by me.    X-ray knee April 2019  Asymmetry along the inferior margin of the left patella raises possibility of minimally displaced fracture fragment from the inferior pole.    X-ray elbows December 2019  Two views of the right elbow demonstrate no acute fracture or dislocation.  On the lateral radiograph, there appears to be soft tissue swelling in the olecranon bursal region with a few tiny calcifications.    Two views of the left elbow demonstrate no acute fracture or dislocation.  On the lateral radiograph, there is minimal enthesopathy at the triceps tendon insertion and soft tissue swelling within the olecranon bursal region with a 4 x 2.2 mm dystrophic calcification within the bursa or distal triceps tendon mineralization.     ASSESSMENT / PLAN:     Elder Hernandez is a 44 y.o. Black or  male with:    1. Olecranon bursitis  - onset after mechanical fall landing on affected sides  - reports improving slowly.  No features of active infection.  Clinical diagnosis of gout on allopurinol.  - will provide with Celestone shot IM and prednisone taper to discontinue if no improvement.  - repeat uric acid before next visit.  Will drain and injectec CS if refractory.  - topical therapy    2. HyperCKemia  - remains with no significant or particular reproducible weakness on exam.  - no current features of active rheumatic disorder, rheumatic autoimmune workup unrevealing.  - consider secondary to body habitus, activity level, ethnic profile.  - no rheumatic indication for immunosuppression at this time.    3. Other specified counseling  - over 10 minutes spent regarding below topics:  - Nutrition and  exercise counseling.  - Limitation of alcohol consumption.  - Medication counseling provided.    No follow-ups on file.  RTC 1 monht    Method of contact with patient concerns: [facility]    Disclaimer:  This note is prepared using voice recognition software and as such is likely to have errors and has not been proof read. Please contact me for questions.     Time spent: 25 minutes in face to face discussion concerning diagnosis, prognosis, review of lab and test results, benefits of treatment as well as management of disease, counseling of patient and coordination of care between various health care providers.  Greater than half the time spent was used for coordination of care and counseling of patient.    Mannie Holland M.D.  Rheumatology Department   [facility]

## 2020-01-07 NOTE — PATIENT INSTRUCTIONS
Bursitis of the Elbow (Olecranon)  Your elbow joint contains a small fluid-filled sac called a bursa. The bursa helps the muscles and tendons move smoothly over the bone. It also cushions and protects your elbow. Bursitis is when the bursa is inflamed or swollen. This is most often due to overuse of or injury to the elbow. Symptoms include swelling and pain. If the elbow is red and feels warm to the touch, the bursa itself may be infected.  In most cases, elbow bursitis resolves with medicine and self-care at home. It may take several weeks for the bursa to heal and the swelling to go away. In some cases, your healthcare provider may drain excess fluid from the bursa. Or, he or she may inject medicine directly into the bursa to help relieve symptoms. In severe cases, you may need surgery to remove the bursa may. If there is concern that the bursa is infected, your healthcare provider may prescribe antibiotics to treat the infection.    Home care  Your healthcare provider may prescribe medicine to help relieve pain and swelling. This may be an over-the-counter pain reliever or prescription pain medicine. Take all medicines as directed. To help treat or prevent infection, your provider may prescribe antibiotics. If these are prescribed, take them as directed until they are gone.  The following are general care guidelines:  · Apply an ice pack or bag of frozen peas wrapped in a thin towel to your elbow for 15 to 20 minutes at a time. Do this 3 to 4 times a day until pain and swelling improve.  · Keep your elbow raised above the level of your heart whenever possible. This helps reduce swelling. When sitting or lying down, place your arm on a pillow that rests on your chest or on a pillow at your side.  · Use an elastic wrap around the elbow joint to compress the area while it is healing. Make the wrap snug but not tight to the point of causing pain.  · Rest your elbow to give it time to heal. You may need to wear an  elbow pad to help protect and limit the movement of your elbow. During and after healing, avoid leaning on your elbows.  Follow-up care  Follow up with your healthcare provider, or as advised. If you have been referred to a specialist, make that appointment promptly.  When to seek medical advice  Call your healthcare provider right away if any of these occur:  · Fever of 100.4°F (38°C) or higher, or as advised  · Chills  · Increased pain, swelling, warmth, redness, or drainage from the joint  · Trouble moving the elbow joint  · Numbness or tingling in the hand  · Severe pain or swelling in forearm or hand  · Loss of pink color and slow return of color after squeezing fingertip or hand  Date Last Reviewed: 6/1/2016  © 2966-1919 [employer]. [address] [place] [address], [place] [address]. All rights reserved. This information is not intended as a substitute for professional medical care. Always follow your healthcare professional's instructions.

## 2020-01-07 NOTE — PROGRESS NOTES
Allergies and medications reviewed prior to administration. Administered 1cc Betamethasone 6mg/cc  to left ventro Gluteal. Pt tolerated well. No acute reaction noted to site. Pt instructed on S/S to report. Pt verbalized understanding.     Lot:[id number]  Exp:12/24/20  Manu:Steven Ch

## 2020-01-07 NOTE — PATIENT INSTRUCTIONS
Bursitis of the Elbow (Olecranon)  Your elbow joint contains a small fluid-filled sac called a bursa. The bursa helps the muscles and tendons move smoothly over the bone. It also cushions and protects your elbow. Bursitis is when the bursa is inflamed or swollen. This is most often due to overuse of or injury to the elbow. Symptoms include swelling and pain. If the elbow is red and feels warm to the touch, the bursa itself may be infected.  In most cases, elbow bursitis resolves with medicine and self-care at home. It may take several weeks for the bursa to heal and the swelling to go away. In some cases, your healthcare provider may drain excess fluid from the bursa. Or, he or she may inject medicine directly into the bursa to help relieve symptoms. In severe cases, you may need surgery to remove the bursa may. If there is concern that the bursa is infected, your healthcare provider may prescribe antibiotics to treat the infection.    Home care  Your healthcare provider may prescribe medicine to help relieve pain and swelling. This may be an over-the-counter pain reliever or prescription pain medicine. Take all medicines as directed. To help treat or prevent infection, your provider may prescribe antibiotics. If these are prescribed, take them as directed until they are gone.  The following are general care guidelines:  · Apply an ice pack or bag of frozen peas wrapped in a thin towel to your elbow for 15 to 20 minutes at a time. Do this 3 to 4 times a day until pain and swelling improve.  · Keep your elbow raised above the level of your heart whenever possible. This helps reduce swelling. When sitting or lying down, place your arm on a pillow that rests on your chest or on a pillow at your side.  · Use an elastic wrap around the elbow joint to compress the area while it is healing. Make the wrap snug but not tight to the point of causing pain.  · Rest your elbow to give it time to heal. You may need to wear an  elbow pad to help protect and limit the movement of your elbow. During and after healing, avoid leaning on your elbows.  Follow-up care  Follow up with your healthcare provider, or as advised. If you have been referred to a specialist, make that appointment promptly.  When to seek medical advice  Call your healthcare provider right away if any of these occur:  · Fever of 100.4°F (38°C) or higher, or as advised  · Chills  · Increased pain, swelling, warmth, redness, or drainage from the joint  · Trouble moving the elbow joint  · Numbness or tingling in the hand  · Severe pain or swelling in forearm or hand  · Loss of pink color and slow return of color after squeezing fingertip or hand  Date Last Reviewed: 6/1/2016  © 2364-7741 [employer]. [address] [place] [address], [place] [address]. All rights reserved. This information is not intended as a substitute for professional medical care. Always follow your healthcare professional's instructions.        What is bursitis?  A bursa is a fluid-filled sac that helps cushion the muscles, tendons, and bones around a joint. When a bursa becomes inflamed, its called bursitis. Common symptoms of bursitis include pain, tenderness, and swelling that limits movement of the joint.  What causes bursitis?  Bursitis is most often caused by overuse of a joint. The repeated movements irritate the bursa and may cause it to swell. When that happens, other surrounding tissues may become inflamed or have less space to move. Bursitis is most common in large joints such as the knee, shoulder, and hip.       Nonsurgical treatment involves both rest and exercise.    How is bursitis treated?  To help reduce pain and swelling, your healthcare provider may recommend one or more of the following:   · Rest gives the bursa time to heal. This means limiting activities that put stress on the joint.  · Anti-inflammatory medications help reduce painful swelling. In some cases, this can include  injections of cortisone or other steroid medicines into the bursa.  · Splints and support bandages improve your comfort and allow the bursa to heal.  · Physical therapy may be used to increase flexibility and strengthen muscles that support the joint.  · Aspiration removes extra fluid from the bursa using a needle. This can help your healthcare provider find out what is causing your bursitis. For example, it might be an infection or overuse.   · Surgery can be used to remove an inflamed or infected bursa. This is rarely needed.  Date Last Reviewed: 9/11/2015  © 5651-4940 [employer]. [address] [place] [address]. All rights reserved. This information is not intended as a substitute for professional medical care. Always follow your healthcare professional's instructions.

## 2020-01-11 NOTE — PROGRESS NOTES
"Subjective:      Patient ID: Eledr Hernandez is a 44 y.o. male.    Chief Complaint: Follow-up    HPI   43 yo with   Patient Active Problem List   Diagnosis    PTSD (post-traumatic stress disorder)    Irritable bowel syndrome with diarrhea    Chronic kidney disease    Migraine with aura and without status migrainosus, not intractable    Erectile dysfunction    HSV-2 infection    HTN (hypertension)    Benign prostatic hyperplasia without lower urinary tract symptoms    Arthritis of both knees    Gout    Bilateral foot pain    FSGS (focal segmental glomerulosclerosis)    Rhabdomyolysis    Lower abdominal pain    Diarrhea    Abdominal pain    Colon polyp     Past Medical History:   Diagnosis Date    Acid reflux     Gout     Hypertension     Kidney problem     PTSD (post-traumatic stress disorder)     Sleep apnea      Here today reporting fall and landing on bart elbows. Resulted in swelling and pain bart elbows. Saw [facility]. Symptoms mildy improving. Has rheum appt already scheduled.    Review of Systems   Constitutional: Negative for chills and fever.   HENT: Negative for ear pain and sore throat.    Respiratory: Negative for cough.    Cardiovascular: Negative for chest pain.   Gastrointestinal: Negative for abdominal pain and blood in stool.   Genitourinary: Negative for dysuria and hematuria.   Neurological: Negative for seizures and syncope.     Objective:   BP (!) 126/90 (BP Location: Left arm, Patient Position: Sitting, BP Method: Large (Manual))   Pulse 77   Temp 98 °F (36.7 °C) (Oral)   Ht 5' 7" (1.702 m)   Wt 103.8 kg (228 lb 13.4 oz)   SpO2 97%   BMI 35.84 kg/m²     Physical Exam   Constitutional: He appears well-developed and well-nourished. No distress.   Cardiovascular: Normal rate.   Pulmonary/Chest: Effort normal and breath sounds normal.   Skin: Skin is warm and dry.   Psychiatric: He has a normal mood and affect. His behavior is normal.   bart elbow swelling. No ttp or " warmth    Assessment:     1. Olecranon bursitis of both elbows      Plan:   Olecranon bursitis of both elbows  -     Cancel: Ambulatory Referral to Pediatric Rheumatology  -     Cancel: Ambulatory Referral to Rheumatology    keep rheum appt     Lab Frequency Next Occurrence   Urinalysis Once 08/26/2019   Urinalysis Once 10/25/2019   CK Once 10/25/2019   Renal function panel Once 10/25/2019   X-Ray Foot Complete Left Once 12/19/2019   Uric acid Once 01/07/2020   CK     Angiotensin converting enzyme     C-reactive protein     Sedimentation rate     Comprehensive metabolic panel         Problem List Items Addressed This Visit     None      Visit Diagnoses     Olecranon bursitis of both elbows    -  Primary          Follow up if symptoms worsen or fail to improve.

## 2020-01-14 ENCOUNTER — PATIENT OUTREACH (OUTPATIENT)
Dept: OTHER | Facility: OTHER | Age: 45
End: 2020-01-14

## 2020-01-14 NOTE — PROGRESS NOTES
[facility]: Health  Follow-Up    The history is provided by the patient.       INTERVENTION(S)  recommended diet modifications and encouragement/support    Patients BP has been trending upward. He attributes this elevation to a fall he had a few weeks ago. Encouraged patient to be mindful of sodium in his diet and increase water intake. Will follow up with patient in 5 weeks.       There are no preventive care reminders to display for this patient.    Last 5 Patient Entered Readings                                      Current 30 Day Average: 139/90     Recent Readings 1/13/2020 1/10/2020 1/9/2020 1/8/2020 1/7/2020    SBP (mmHg) 148 146 141 140 125    DBP (mmHg) 93 92 95 93 82    Pulse 69 97 66 98 97                  Screenings    SDOH

## 2020-01-15 DIAGNOSIS — I10 ESSENTIAL HYPERTENSION: ICD-10-CM

## 2020-01-15 RX ORDER — AMLODIPINE BESYLATE 2.5 MG/1
2.5 TABLET ORAL DAILY
Qty: 90 TABLET | Refills: 1 | Status: SHIPPED | OUTPATIENT
Start: 2020-01-15 | End: 2020-03-30 | Stop reason: DRUGHIGH

## 2020-01-21 ENCOUNTER — CLINICAL SUPPORT (OUTPATIENT)
Dept: REHABILITATION | Facility: HOSPITAL | Age: 45
End: 2020-01-21
Attending: PHYSICAL MEDICINE & REHABILITATION
Payer: OTHER GOVERNMENT

## 2020-01-21 DIAGNOSIS — R19.8 ABDOMINAL WEAKNESS: ICD-10-CM

## 2020-01-21 DIAGNOSIS — M62.89 MUSCLE TONE INCREASED: ICD-10-CM

## 2020-01-21 DIAGNOSIS — M62.89 MUSCLE TIGHTNESS: ICD-10-CM

## 2020-01-21 PROCEDURE — 97161 PT EVAL LOW COMPLEX 20 MIN: CPT

## 2020-01-21 PROCEDURE — 97110 THERAPEUTIC EXERCISES: CPT
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
## 2020-01-23 PROBLEM — R19.8 ABDOMINAL WEAKNESS: Status: ACTIVE | Noted: 2020-01-23

## 2020-01-23 PROBLEM — M62.89 MUSCLE TONE INCREASED: Status: ACTIVE | Noted: 2020-01-23

## 2020-01-23 PROBLEM — M62.89 MUSCLE TIGHTNESS: Status: ACTIVE | Noted: 2020-01-23

## 2020-02-04 ENCOUNTER — LAB VISIT (OUTPATIENT)
Dept: LAB | Facility: HOSPITAL | Age: 45
End: 2020-02-04
Attending: INTERNAL MEDICINE
Payer: OTHER GOVERNMENT

## 2020-02-04 ENCOUNTER — OFFICE VISIT (OUTPATIENT)
Dept: RHEUMATOLOGY | Facility: CLINIC | Age: 45
End: 2020-02-04
Payer: OTHER GOVERNMENT

## 2020-02-04 VITALS
BODY MASS INDEX: 35.29 KG/M2 | WEIGHT: 225.31 LBS | DIASTOLIC BLOOD PRESSURE: 88 MMHG | HEART RATE: 59 BPM | SYSTOLIC BLOOD PRESSURE: 139 MMHG

## 2020-02-04 DIAGNOSIS — R74.8 HYPERCKEMIA: ICD-10-CM

## 2020-02-04 DIAGNOSIS — Z71.89 COUNSELING ON HEALTH PROMOTION AND DISEASE PREVENTION: ICD-10-CM

## 2020-02-04 DIAGNOSIS — M10.9 GOUT, UNSPECIFIED CAUSE, UNSPECIFIED CHRONICITY, UNSPECIFIED SITE: ICD-10-CM

## 2020-02-04 DIAGNOSIS — M70.21 OLECRANON BURSITIS OF BOTH ELBOWS: ICD-10-CM

## 2020-02-04 DIAGNOSIS — N18.30 STAGE 3 CHRONIC KIDNEY DISEASE: Primary | ICD-10-CM

## 2020-02-04 DIAGNOSIS — N18.30 STAGE 3 CHRONIC KIDNEY DISEASE: ICD-10-CM

## 2020-02-04 DIAGNOSIS — M10.9 URIC ACID ARTHROPATHY: ICD-10-CM

## 2020-02-04 DIAGNOSIS — M1A.9XX0 CHRONIC GOUT WITHOUT TOPHUS, UNSPECIFIED CAUSE, UNSPECIFIED SITE: ICD-10-CM

## 2020-02-04 DIAGNOSIS — M70.22 OLECRANON BURSITIS OF BOTH ELBOWS: Primary | ICD-10-CM

## 2020-02-04 DIAGNOSIS — M70.21 OLECRANON BURSITIS OF BOTH ELBOWS: Primary | ICD-10-CM

## 2020-02-04 DIAGNOSIS — M70.22 OLECRANON BURSITIS OF BOTH ELBOWS: ICD-10-CM

## 2020-02-04 LAB
ALBUMIN SERPL BCP-MCNC: 3.8 G/DL (ref 3.5–5.2)
ANION GAP SERPL CALC-SCNC: 8 MMOL/L (ref 8–16)
BUN SERPL-MCNC: 14 MG/DL (ref 6–20)
CALCIUM SERPL-MCNC: 9.5 MG/DL (ref 8.7–10.5)
CHLORIDE SERPL-SCNC: 103 MMOL/L (ref 95–110)
CK SERPL-CCNC: 402 U/L (ref 20–200)
CO2 SERPL-SCNC: 26 MMOL/L (ref 23–29)
CREAT SERPL-MCNC: 1.6 MG/DL (ref 0.5–1.4)
EST. GFR  (AFRICAN AMERICAN): 58.9 ML/MIN/1.73 M^2
EST. GFR  (NON AFRICAN AMERICAN): 50.9 ML/MIN/1.73 M^2
GLUCOSE SERPL-MCNC: 88 MG/DL (ref 70–110)
PHOSPHATE SERPL-MCNC: <1 MG/DL (ref 2.7–4.5)
POTASSIUM SERPL-SCNC: 4.6 MMOL/L (ref 3.5–5.1)
SODIUM SERPL-SCNC: 137 MMOL/L (ref 136–145)
URATE SERPL-MCNC: 8.3 MG/DL (ref 3.4–7)

## 2020-02-04 PROCEDURE — 84550 ASSAY OF BLOOD/URIC ACID: CPT

## 2020-02-04 PROCEDURE — 99213 OFFICE O/P EST LOW 20 MIN: CPT | Mod: PBBFAC | Performed by: INTERNAL MEDICINE

## 2020-02-04 PROCEDURE — 99214 OFFICE O/P EST MOD 30 MIN: CPT | Mod: S$PBB,,, | Performed by: INTERNAL MEDICINE

## 2020-02-04 PROCEDURE — 80069 RENAL FUNCTION PANEL: CPT

## 2020-02-04 PROCEDURE — 36415 COLL VENOUS BLD VENIPUNCTURE: CPT

## 2020-02-04 PROCEDURE — 99999 PR PBB SHADOW E&M-EST. PATIENT-LVL III: ICD-10-PCS | Mod: PBBFAC,,, | Performed by: INTERNAL MEDICINE

## 2020-02-04 PROCEDURE — 99214 PR OFFICE/OUTPT VISIT, EST, LEVL IV, 30-39 MIN: ICD-10-PCS | Mod: S$PBB,,, | Performed by: INTERNAL MEDICINE

## 2020-02-04 PROCEDURE — 82550 ASSAY OF CK (CPK): CPT

## 2020-02-04 PROCEDURE — 99999 PR PBB SHADOW E&M-EST. PATIENT-LVL III: CPT | Mod: PBBFAC,,, | Performed by: INTERNAL MEDICINE

## 2020-02-04 NOTE — PROGRESS NOTES
RHEUMATOLOGY OUTPATIENT CLINIC NOTE    2/4/2020    Attending Rheumatologist: Mannie Holland  Primary Care Provider: Tommie Baltazar MD   Physician Requesting Consultation: No referring provider defined for this encounter.  Chief Complaint/Reason For Consultation:  Follow-up    Subjective:       GIANA  Elder Hernandez is a 44 y.o. Black or  male hyper CK Gloria comes for follow-up.    Today  Last seen on early January.  No features of inflammatory myositis.  Presentation consistent with bilateral olecranon bursitis precipitated by trauma.  Recommend for local measures, provided with systemic corticosteroids.  Short-term relief reported by patient.  Significant improvement on left arm, however refers stagnant improvement of bursitis on right elbow.  Tenderness and swelling remains, denies any significant joint pain or swelling.  Has historical diagnosis of gout made clinically, currently on 150mg of allopurinol daily.  Denies precipitation of events with any particular food/beverage intake.  Denies fever, rash, weakness,  or GI complaints.    Addendum: Repeat uric acid not at goal.  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  Will increase allopurinol to 300 mg and provide with colchicine daily for prophylaxis (last calculated CrCl 83mL/min).  Will also provide with prednisone high-dose for rescue therapy p.r.n. for flares.  Discussed with patient, verbalized understanding.  Will repeat labs prior next visit.    Review of Systems   Constitutional: Negative for chills, fever and malaise/fatigue.   Eyes: Negative for pain and redness.   Respiratory: Negative for cough, hemoptysis and shortness of breath.    Cardiovascular: Negative for chest pain and leg swelling.   Gastrointestinal: Negative for abdominal pain, blood in stool and melena.   Genitourinary: Negative for dysuria and hematuria.   Musculoskeletal: Positive for joint pain (Elbows bilaterally, R>L.   Onset after mechanical fall.). Negative for back pain.   Skin: Negative for rash.   Neurological: Negative for tingling, focal weakness and weakness.   Psychiatric/Behavioral: Negative for memory loss. The patient does not have insomnia.      Chronic comorbid conditions affecting medical decision making today:  Past Medical History:   Diagnosis Date    Acid reflux     Gout     Hypertension     Kidney problem     PTSD (post-traumatic stress disorder)     Sleep apnea    · FSGS  · History of rhabdomyolysis    Past Surgical History:   Procedure Laterality Date    COLONOSCOPY N/A 10/8/2019    Procedure: COLONOSCOPY;  Surgeon: Niraj Grey MD;  Location[facility];  Service: Endoscopy;  Laterality: N/A;    RENAL BIOPSY Right 2015    SHOULDER SURGERY Left 2015     Family History   Problem Relation Age of Onset    No Known Problems Mother     No Known Problems Father      Social History     Substance and Sexual Activity   Alcohol Use Yes    Comment: socially     Social History     Tobacco Use   Smoking Status Never Smoker   Smokeless Tobacco Never Used     Social History     Substance and Sexual Activity   Drug Use Never       Current Outpatient Medications:     allopurinol (ZYLOPRIM) 300 MG tablet, Take 1/2 tablet by mouth once daily., Disp: 45 tablet, Rfl: 1    amLODIPine (NORVASC) 2.5 MG tablet, Take 1 tablet (2.5 mg total) by mouth once daily., Disp: 90 tablet, Rfl: 1    diclofenac sodium (VOLTAREN) 1 % Gel, Apply 2 grams topically once daily., Disp: 1 Tube, Rfl: 2    dicyclomine (BENTYL) 20 mg tablet, Take 1 tablet (20 mg total) by mouth 3 (three) times daily as needed (abdominal pain)., Disp: 90 tablet, Rfl: 0    losartan (COZAAR) 25 MG tablet, Take 1 tablet (25 mg total) by mouth once daily., Disp: 90 tablet, Rfl: 3    meloxicam (MOBIC) 15 MG tablet, Take 1 tablet (15 mg total) by mouth once daily. For pain and inflammation, Disp: 15 tablet, Rfl: 0    methocarbamol (ROBAXIN) 750 MG Tab, Take one  "or two tablets by mouth three times daily as needed for muscle spasm., Disp: 30 tablet, Rfl: 0    methylPREDNISolone (MEDROL DOSEPACK) 4 mg tablet, use as directed, Disp: 1 Package, Rfl: 0    MULTIVITAMIN ORAL, Take 1 tablet by mouth once daily., Disp: , Rfl:     prazosin (MINIPRESS) 2 MG Cap, Take 1 capsule (2 mg total) by mouth every evening., Disp: 90 capsule, Rfl: 3    predniSONE (DELTASONE) 5 MG tablet, Take 4 tablets (20 mg total) by mouth once daily for 4 days, THEN 3 tablets (15 mg total) once daily for 4 days, THEN 2 tablets (10 mg total) once daily for 4 days, THEN 1 tablet (5 mg total) once daily for 4 days, THEN 1 tablet (5 mg total) every other day for 4 days., Disp: 42 tablet, Rfl: 0    sertraline (ZOLOFT) 100 MG tablet, Take 1 tablet (100 mg total) by mouth once daily., Disp: 90 tablet, Rfl: 3    sildenafil (VIAGRA) 100 MG tablet, Take 1 tablet (100 mg total) by mouth daily as needed for Erectile Dysfunction., Disp: 12 tablet, Rfl: 0    traZODone (DESYREL) 150 MG tablet, Take 1 tablet (150 mg total) by mouth every evening., Disp: 90 tablet, Rfl: 2    valACYclovir (VALTREX) 500 MG tablet, Take 1 tablet (500 mg total) by mouth 2 (two) times daily as needed., Disp: 30 tablet, Rfl: 0    rizatriptan (MAXALT-MLT) 10 MG disintegrating tablet, Take 1 tablet (10 mg total) by mouth daily as needed for Migraine. May repeat in 2 hours if needed, Disp: 30 tablet, Rfl: 0    topiramate (TOPAMAX) 25 MG tablet, Take 1 tablet (25 mg total) by mouth 2 (two) times daily. take one tablet at night for first week., Disp: 180 tablet, Rfl: 0     Objective:         Vitals:    02/04/20 1119   BP: 139/88   Pulse: (!) 59     Physical Exam   Constitutional: No distress.   Estimated body mass index is 35.77 kg/m² as calculated from the following:    Height as of this encounter: 5' 7" (1.702 m).    Weight as of this encounter: 103.6 kg (228 lb 6.3 oz).    Wt Readings from Last 1 Encounters:  01/07/20 0949 : 103.6 kg (228 " lb 6.3 oz)     HENT:   Head: Normocephalic and atraumatic.   Eyes: Conjunctivae are normal. Pupils are equal, round, and reactive to light.   Neck: Normal range of motion.   Cardiovascular: Normal rate and intact distal pulses.    Pulmonary/Chest: Effort normal. No respiratory distress.   Abdominal: Soft. He exhibits no distension.   Neurological: He is alert.   Muscle strength 5/5 proximally and distally throughout.   Skin: No rash noted. No erythema.     Musculoskeletal: Normal range of motion. He exhibits tenderness (Olecranon bursa right side).   : strong  R olecranon bursa: Swelling+, warmth +.  No erythema.  No synovitis, enthesitis, or significant squeeze tenderness otherwise    AROM: intact  PROM: intact    Devices used by patient: none                   Reviewed old and all outside pertinent medical records available.    All lab results personally reviewed and interpreted by me.  Lab Results   Component Value Date    WBC 10.23 05/06/2019    HGB 14.2 05/06/2019    HCT 39.6 (L) 05/06/2019    MCV 88 05/06/2019    MCH 31.7 (H) 05/06/2019    MCHC 35.9 05/06/2019    RDW 13.8 05/06/2019     05/06/2019    MPV 11.0 05/06/2019       Lab Results   Component Value Date     10/21/2019     10/21/2019    K 4.2 10/21/2019    K 4.2 10/21/2019     10/21/2019     10/21/2019    CO2 28 10/21/2019    CO2 28 10/21/2019    GLU 79 10/21/2019    GLU 79 10/21/2019    BUN 19 10/21/2019    BUN 19 10/21/2019    CALCIUM 9.4 10/21/2019    CALCIUM 9.4 10/21/2019    PROT 8.2 10/18/2019    ALBUMIN 3.7 10/21/2019    BILITOT 0.5 10/18/2019    AST 43 (H) 10/18/2019    ALKPHOS 85 10/18/2019    ALT 47 (H) 10/18/2019       Lab Results   Component Value Date    COLORU Yellow 10/21/2019    APPEARANCEUA Clear 10/21/2019    SPECGRAV 1.020 10/21/2019    PHUR 8.0 10/21/2019    PROTEINUA 2+ (A) 10/21/2019    KETONESU Negative 10/21/2019    LEUKOCYTESUR Negative 10/21/2019    NITRITE Negative 10/21/2019       Lab Results    Component Value Date    CRP 3.4 10/18/2019       Lab Results   Component Value Date    SEDRATE <2 10/18/2019       Lab Results   Component Value Date    SEDRATE <2 10/18/2019       No components found for: 25OHVITDTOT, 77HSVCAQ7, 03WOTDVI8, METHODNOTE    Lab Results   Component Value Date    URICACID 5.6 04/24/2019     No components found for: TSPOTTB    · CK: 830 (5/2019) -> 543 (8/2019)-> 707 (10/2019)  · Aldolase within reference value    Rheum Labs:   MARINA negative   RNP antibody negative   Myomarker panel 3 negative     Infectious Labs:   HIV NR     Imaging:  All imaging reviewed and independently  interpreted by me.    X-ray elbows December 2019  Two views of the right elbow demonstrate no acute fracture or dislocation.  On the lateral radiograph, there appears to be soft tissue swelling in the olecranon bursal region with a few tiny calcifications.    Two views of the left elbow demonstrate no acute fracture or dislocation.  On the lateral radiograph, there is minimal enthesopathy at the triceps tendon insertion and soft tissue swelling within the olecranon bursal region with a 4 x 2.2 mm dystrophic calcification within the bursa or distal triceps tendon mineralization.     ASSESSMENT / PLAN:     Elder Hernandez is a 44 y.o. Black or  male with:    1. Olecranon bursitis  - onset after mechanical fall late December, improvement of left elbow  - Patient perceives no improvement on Rt olecranon bursa.  No significant response with systemic corticosteroids.  - do not recommend arthrocentesis or CSI at this time.  Potential sinus tract formation and or infection discussed  - will compare exam on 6 weeks looking for any positive change.  If none present, will attempt arthrocentesis  - awaiting uric acid to reported.  If elevated will adjust allopurinol to goal of less than 6 mg/dl.  - recommend following with Orthopedics.  - continue with topical therapy and elbow protection.    2.  HyperCKemia  - remains with no significant or particular reproducible weakness on exam.  - no current features of active rheumatic disorder, rheumatic autoimmune workup unrevealing.  - consider secondary to body habitus, activity level, ethnic profile.  - no rheumatic indication for immunosuppression at this time.    3. Other specified counseling  - over 10 minutes spent regarding below topics:  - Nutrition and exercise counseling.  - Limitation of alcohol consumption.  - Medication counseling provided.    No follow-ups on file.  RTC 6 weeks    Method of contact with patient concerns: [facility]    Disclaimer:  This note is prepared using voice recognition software and as such is likely to have errors and has not been proof read. Please contact me for questions.     Time spent: 25 minutes in face to face discussion concerning diagnosis, prognosis, review of lab and test results, benefits of treatment as well as management of disease, counseling of patient and coordination of care between various health care providers.  Greater than half the time spent was used for coordination of care and counseling of patient.    Mannie Holland M.D.  Rheumatology Department   [facility]

## 2020-02-05 ENCOUNTER — LAB VISIT (OUTPATIENT)
Dept: LAB | Facility: HOSPITAL | Age: 45
End: 2020-02-05
Attending: INTERNAL MEDICINE
Payer: OTHER GOVERNMENT

## 2020-02-05 ENCOUNTER — TELEPHONE (OUTPATIENT)
Dept: NEPHROLOGY | Facility: CLINIC | Age: 45
End: 2020-02-05

## 2020-02-05 ENCOUNTER — TELEPHONE (OUTPATIENT)
Dept: RHEUMATOLOGY | Facility: CLINIC | Age: 45
End: 2020-02-05

## 2020-02-05 DIAGNOSIS — N18.30 STAGE 3 CHRONIC KIDNEY DISEASE: ICD-10-CM

## 2020-02-05 LAB
ALBUMIN SERPL BCP-MCNC: 3.7 G/DL (ref 3.5–5.2)
ANION GAP SERPL CALC-SCNC: 8 MMOL/L (ref 8–16)
BUN SERPL-MCNC: 13 MG/DL (ref 6–20)
CALCIUM SERPL-MCNC: 9.6 MG/DL (ref 8.7–10.5)
CHLORIDE SERPL-SCNC: 101 MMOL/L (ref 95–110)
CO2 SERPL-SCNC: 28 MMOL/L (ref 23–29)
CREAT SERPL-MCNC: 1.7 MG/DL (ref 0.5–1.4)
EST. GFR  (AFRICAN AMERICAN): 55 ML/MIN/1.73 M^2
EST. GFR  (NON AFRICAN AMERICAN): 47 ML/MIN/1.73 M^2
GLUCOSE SERPL-MCNC: 90 MG/DL (ref 70–110)
PHOSPHATE SERPL-MCNC: 2.9 MG/DL (ref 2.7–4.5)
POTASSIUM SERPL-SCNC: 4 MMOL/L (ref 3.5–5.1)
SODIUM SERPL-SCNC: 137 MMOL/L (ref 136–145)

## 2020-02-05 PROCEDURE — 80069 RENAL FUNCTION PANEL: CPT

## 2020-02-05 PROCEDURE — 36415 COLL VENOUS BLD VENIPUNCTURE: CPT

## 2020-02-05 RX ORDER — ALLOPURINOL 300 MG/1
300 TABLET ORAL DAILY
Qty: 30 TABLET | Refills: 2 | Status: SHIPPED | OUTPATIENT
Start: 2020-02-05 | End: 2020-03-06

## 2020-02-05 RX ORDER — COLCHICINE 0.6 MG/1
0.6 TABLET ORAL DAILY
Qty: 30 TABLET | Refills: 2 | Status: SHIPPED | OUTPATIENT
Start: 2020-02-05 | End: 2020-11-17

## 2020-02-05 NOTE — PROGRESS NOTES
Renal chart check: noted PO4 is <1.  Pt was called by me. Feelsfine, labs from yesterday, no sob, no muscle weakness. Did not want to go to ER for low PO4.    Is his low PO4 related to his high CK?  No heavy exercise before the labs to have caused resp alkalosis. Drank heavily 2 days so. Reports mild diarrhea yesterday. No prior low PO4 bases on lab review.    A/p hypophosphatemia  Unsure related to mild high CK.  Likely transient.  May be related to diarrhea and to recent heavy alcohol intake.  Advised or to drink 2 glasses of mild a day to replace PO4.  Refused to go to ER  Advised or to go to ER for muscle weakness or for sob.

## 2020-02-05 NOTE — TELEPHONE ENCOUNTER
----- Message from Mannie Holland MD sent at 2/5/2020  8:58 AM CST -----  Please schedule repeat uric acid in CMP prior next visit in 6 weeks.  Already discussed with patient.    Thank you very much!    Sincerely,    Mannie Holland  Rheumatology Department   [facility]    . Crystal arthropathy  - typical clinical presentation, presence of crystals / tophi, uric acid level, imaging evidence.  - Consider Arthrocentesis for C,C,C and Gram stain  - Uric acid, CBC, CMP, ESR, CRP  - Consider Arthrocentesis for C,C,C and Gram stain  - XR to assess for erosions with overhanging edge, and to exclude chondrocalcinosis  - Dietary and lifestyle modifications  - acute flare  - NSAIDs (indomethacin)-> 50 mg qid 24-48 h, then 50 mg tid for 48 h; taper and discontinue after the attack   - colchicine: 1.2 mg followed by 0.6 mg 1 h later (CI if elderly, renal/hepatic, cyclosporine, clarithro/erythromycin, keto/itraconazole, disulfiram, HIV PI, diltiazem, verapamil, and grapefruit)-> at risk for toxicity.  - Intraarticular steroids  - Systemic corticosteroids ; Prednisone 0.5 mg/kg per day for 5-10 days or for 2-5 days then taper for 7-10 days  - chronic treatment  - 2 or more acute attacks within 1 to 2 years,  Renal stones, tophaceous,  established gout with chronic kidney disease stage 2 or worse  - recommend for Urate Lowering therapy and prophylaxis  - uric acid goal of <6.0 mg/dL  - allopurinol / febuxostat.  Optimize therapy clinical response.  - consider addition of lesinurad  - severe, chronic tophaceous gout.  Consider for therapy with pegloticase  - clinical significance side effects of therapy were discussed.

## 2020-02-05 NOTE — PROGRESS NOTES
Lab Results   Component Value Date    CREATININE 1.6 (H) 02/04/2020    BUN 14 02/04/2020     02/04/2020    K 4.6 02/04/2020     02/04/2020    CO2 26 02/04/2020     Lab Results   Component Value Date    CALCIUM 9.5 02/04/2020    PHOS <1.0 (LL) 02/04/2020     Critical value of low phosphorus not reported.  I have called and discussed with the patient he feels absolutely at baseline no muscle weakness no nausea vomiting or diarrhea.  No fever and chills     Advised to eat normally tonight we will repeat stat labs in the morning.    I will notify my staff to communicate with the patient to what time and which laboratory he needs to come for stat labs    Will also let dr. Bello know       Bella Munroe MD

## 2020-02-11 ENCOUNTER — OFFICE VISIT (OUTPATIENT)
Dept: NEPHROLOGY | Facility: CLINIC | Age: 45
End: 2020-02-11
Payer: OTHER GOVERNMENT

## 2020-02-11 VITALS
BODY MASS INDEX: 34.88 KG/M2 | HEART RATE: 81 BPM | OXYGEN SATURATION: 97 % | SYSTOLIC BLOOD PRESSURE: 128 MMHG | HEIGHT: 67 IN | WEIGHT: 222.25 LBS | DIASTOLIC BLOOD PRESSURE: 84 MMHG

## 2020-02-11 DIAGNOSIS — N18.30 STAGE 3 CHRONIC KIDNEY DISEASE: ICD-10-CM

## 2020-02-11 DIAGNOSIS — M62.82 NON-TRAUMATIC RHABDOMYOLYSIS: ICD-10-CM

## 2020-02-11 DIAGNOSIS — E83.39 HYPOPHOSPHATEMIA: ICD-10-CM

## 2020-02-11 DIAGNOSIS — R74.8 HYPERCKEMIA: Primary | ICD-10-CM

## 2020-02-11 DIAGNOSIS — R31.29 MICROSCOPIC HEMATURIA: ICD-10-CM

## 2020-02-11 DIAGNOSIS — N05.1 FSGS (FOCAL SEGMENTAL GLOMERULOSCLEROSIS): ICD-10-CM

## 2020-02-11 DIAGNOSIS — R80.9 PROTEINURIA, UNSPECIFIED TYPE: ICD-10-CM

## 2020-02-11 PROCEDURE — 99214 OFFICE O/P EST MOD 30 MIN: CPT | Mod: PBBFAC | Performed by: INTERNAL MEDICINE

## 2020-02-11 PROCEDURE — 99215 PR OFFICE/OUTPT VISIT, EST, LEVL V, 40-54 MIN: ICD-10-PCS | Mod: S$PBB,,, | Performed by: INTERNAL MEDICINE

## 2020-02-11 PROCEDURE — 99999 PR PBB SHADOW E&M-EST. PATIENT-LVL IV: ICD-10-PCS | Mod: PBBFAC,,, | Performed by: INTERNAL MEDICINE

## 2020-02-11 PROCEDURE — 99215 OFFICE O/P EST HI 40 MIN: CPT | Mod: S$PBB,,, | Performed by: INTERNAL MEDICINE

## 2020-02-11 PROCEDURE — 99999 PR PBB SHADOW E&M-EST. PATIENT-LVL IV: CPT | Mod: PBBFAC,,, | Performed by: INTERNAL MEDICINE

## 2020-02-11 NOTE — PROGRESS NOTES
"Nephrology clinic f/u note:  Date of clinic visit: 2/11/20     Referring physician: Dr. Baltazar  Reason for f/u and chief c/o: CKD, proteinuria, and asymptomatic increase in CK. F/u alos for hypophosphatemia     HPI: Pt is a 43 y/o AA male with CKD stage 3 and proteinuria, who presents for f/u. Pt was last seen by us 4 months ago. Chart was reviewed. 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 Pt also has a h/o of frequently occurring rhabdomyolysis (microscopic hematuria without RBC's in urine), and recently was noted to have asymptomatic increase (after adequate rest was achieved) in creatine kinase (CK). CK has been marginally to slightly increased in multiple tests. The tests were done when pt was resting. TSH was normal in April 2019. 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 as he is required to take part in strenuous physical activity from time to time to keep fit. Pt reported previously that he had seen "dark colored urine" after each exercising.     Pt returns for f/u today. As documented, pt had labs done last week which showed PO4 of <1.0. Pt was called by me. Labs were not post exercise, pt denies hyperventilation before the lab test. Reported "heavy alcohol intake 2 nights before the labs" and had diarrhea on the morning of the lab test. On f/u today, no new or acute c/o's, no SOB, no muscle weakness, no CP, no SOB, no blood in urine, urine is not dark. Denies using NSAIds, except occasional mabic. Labs reviewed with pt.        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 Kidney biopsy was done in [place] in 2015, CKD stage 3, rhabdomyolysis, hyperCKemeia, Acid reflux, irritable bowel syndrome, Gout, Hypertension, PTSD (post-traumatic stress " disorder), and Sleep apnea.     PAST SURGICAL HISTORY:  He  has a past surgical history that includes Shoulder surgery (Left, 2015) and Renal biopsy (Right, 2015).     SOCIAL HISTORY:  He  reports that he has never smoked. He does not have any smokeless tobacco history on file. He reports that he drinks alcohol. He reports that he does not use drugs.     FAMILY MEDICAL HISTORY:  His family history is not on file.     Review of patient's allergies indicates:  No Known Allergies     Meds reviewed    Current Outpatient Medications:     allopurinoL (ZYLOPRIM) 300 MG tablet, Take 1 tablet (300 mg total) by mouth once daily., Disp: 30 tablet, Rfl: 2    amLODIPine (NORVASC) 2.5 MG tablet, Take 1 tablet (2.5 mg total) by mouth once daily., Disp: 90 tablet, Rfl: 1    colchicine (COLCRYS) 0.6 mg tablet, Take 1 tablet (0.6 mg total) by mouth once daily., Disp: 30 tablet, Rfl: 2    diclofenac sodium (VOLTAREN) 1 % Gel, Apply 2 grams topically once daily., Disp: 1 Tube, Rfl: 2    dicyclomine (BENTYL) 20 mg tablet, Take 1 tablet (20 mg total) by mouth 3 (three) times daily as needed (abdominal pain)., Disp: 90 tablet, Rfl: 0    losartan (COZAAR) 25 MG tablet, Take 1 tablet (25 mg total) by mouth once daily., Disp: 90 tablet, Rfl: 3    meloxicam (MOBIC) 15 MG tablet, Take 1 tablet (15 mg total) by mouth once daily. For pain and inflammation, Disp: 15 tablet, Rfl: 0    methocarbamol (ROBAXIN) 750 MG Tab, Take one or two tablets by mouth three times daily as needed for muscle spasm., Disp: 30 tablet, Rfl: 0    methylPREDNISolone (MEDROL DOSEPACK) 4 mg tablet, use as directed, Disp: 1 Package, Rfl: 0    MULTIVITAMIN ORAL, Take 1 tablet by mouth once daily., Disp: , Rfl:     prazosin (MINIPRESS) 2 MG Cap, Take 1 capsule (2 mg total) by mouth every evening., Disp: 90 capsule, Rfl: 3    sertraline (ZOLOFT) 100 MG tablet, Take 1 tablet (100 mg total) by mouth once daily., Disp: 90 tablet, Rfl: 3    sildenafil (VIAGRA) 100 MG  "tablet, Take 1 tablet (100 mg total) by mouth daily as needed for Erectile Dysfunction., Disp: 12 tablet, Rfl: 0    traZODone (DESYREL) 150 MG tablet, Take 1 tablet (150 mg total) by mouth every evening., Disp: 90 tablet, Rfl: 2    valACYclovir (VALTREX) 500 MG tablet, Take 1 tablet (500 mg total) by mouth 2 (two) times daily as needed., Disp: 30 tablet, Rfl: 0    rizatriptan (MAXALT-MLT) 10 MG disintegrating tablet, Take 1 tablet (10 mg total) by mouth daily as needed for Migraine. May repeat in 2 hours if needed, Disp: 30 tablet, Rfl: 0    topiramate (TOPAMAX) 25 MG tablet, Take 1 tablet (25 mg total) by mouth 2 (two) times daily. take one tablet at night for first week., Disp: 180 tablet, Rfl: 0      REVIEW OF SYSTEMS:  Patient has no fever, fatigue, visual changes, chest pain, edema, cough, dyspnea, nausea, vomiting, constipation, diarrhea, arthralgias, pruritis, dizziness, weakness, depression, confusion.      PHYSICAL EXAM:  Blood pressure 128/84, pulse 80, height 5' 7" (1.702 m), weight 100.8 Kg, last visit 101.2 Kg   Gen:    WDWN male in no apparent distress  Psych: Normal mood and affect  Skin:    No rashes or ulcers  Eyes:   Normal conjunctiva and lids, PERRLA  ENT:    Normal hearing with no oropharyngeal lesions  Neck:   No JVD  Chest:  Clear with no rales, rhonchi, wheezing with normal effort  CV:      Regular with no murmurs, gallops or rubs  Abd:     Soft, nontender, no distension, positive bowel sounds  Ext:      No cyanosis, clubbing or edema     Labs: reviewed  BMP  Lab Results   Component Value Date     02/05/2020    K 4.0 02/05/2020     02/05/2020    CO2 28 02/05/2020    BUN 13 02/05/2020    CREATININE 1.7 (H) 02/05/2020    CALCIUM 9.6 02/05/2020    ANIONGAP 8 02/05/2020    ESTGFRAFRICA 55 (A) 02/05/2020    EGFRNONAA 47 (A) 02/05/2020     Lab Results   Component Value Date    WBC 10.23 05/06/2019    HGB 14.2 05/06/2019    HCT 39.6 (L) 05/06/2019    MCV 88 05/06/2019     " "05/06/2019         Lab Results   Component Value Date    TSH 2.238 04/24/2019       's to 800's     PSA 0.4  U/a: 2+ protein, trace blood, no RBC's, no casts  Urine protein/Cr 910 mg  ESR normal  CRP normal  HIV neg        IMPRESSION AND RECOMMENDATIONS:  43 y/o male with CKD stage 3, and past h/o of frequent rhabdomyolysis and asymptomatic increase in CK:  The impression is:     1. Renal: s Cr stable, unchanged  Stable renal function.  CKD stage 3 at baseline  Exact reason for CKD not clear, may have been due to residual renal damage after prior rhabdomyolysis  Secondary FSGS usually does not lead to renal failure  DDX HTN, vs episodes of low BP/hypotension and nearly daily diarrhea from IBS vs damage due to rhabdomyolysis  Also, pt is quite muscular, therefore, baseline s Cr of 1.5 is not very high for him     Proteinuria is in sub-nephrotic range  [place] kidney biopsy report c/w FSGS with glomerulomegaly, likely secondary FSGS  HIV noted neg  Denies social risk factors for hep C, hep B  Secondary FSGS usually does not cause renal failure. It does cause proteinuria, even nephrotic syndrome.  Secondary FSGS is usually caused by renal hyperfilteration    Recent hypophosphatemia: noted in chart check, discussed with pt  Transient, has resolved, PO4 normal now  Likely due to alcohol intake or diarrhea  Prior PO4 all normal  Does pt has a propensity for hypophosphatemia related to his other issues, high CK, rhabdo??     2. Past h/o of rhabdomyolysis: has microscopic hematuria without RBC's in urine  Elevated CK's are likely cause of microscopic hematuria  A dx of rhabdomyolysis therefore may be an "overdiagnosis"  However, pt engages in strenuous physical activity as part of his job being in the [employer], therefore, he likely has a predisposition for rhabdomyolsysis     3. Rise in CK: discussed extensively with pt again today  Increase in CK recorded even after adequate rest  Has 1 brother and 5 sisters, " no similar condition in the siblings   TSH was normal  Not consistently on prednisone  DDX: secondary to prednisone (appears to have taken periodically for gout)  DDX: genetic/mitochondrial disease/neuromuscular disease   Rheumatology note reviewed  Recommend referral to genetics in [place], either at [facility] or at [facility]        Plans and recommendations:  As discussed above  Opportunity for questions provided.  Discussed with pt referral to genetics  RTC 6 months or sooner pending BMP results today or results of biopsy from [place]  Total time spent 40 minutes including time needed to review the records, the   patient evaluation, documentation, face-to-face discussion with the patient,   more than 50% of the time was spent on coordination of care and counseling.    Level V visit.     Eliel Bello

## 2020-02-18 ENCOUNTER — PATIENT OUTREACH (OUTPATIENT)
Dept: OTHER | Facility: OTHER | Age: 45
End: 2020-02-18

## 2020-02-18 NOTE — PROGRESS NOTES
Digital Medicine: Health  Follow-Up    The history is provided by the patient.     Follow Up  Follow-up reason(s): reading review      Readings are trending up due to inaccurate technique.        INTERVENTION(S)  reviewed monitoring technique    Patients readings are controlled in office. Reviewed technique with patient. He states he takes his medication at least 20-30 minutes before taking a reading, gets ready for work, and then sits down at his kitchen table to take a reading before he leaves to go to work. Encouraged patient to take meds and wait at least one hour before taking a reading. Also emphasized to patient to rest before taking a reading. Patient complied. Will follow up in two week to check in with patient and review readings.      There are no preventive care reminders to display for this patient.    Last 5 Patient Entered Readings                                      Current 30 Day Average: 150/100     Recent Readings 2/18/2020 2/14/2020 2/13/2020 2/12/2020 1/27/2020    SBP (mmHg) 152 156 150 151 143    DBP (mmHg) 105 96 101 98 101    Pulse 82 59 64 75 75                  Screenings    SDOH

## 2020-02-27 DIAGNOSIS — N52.9 ERECTILE DYSFUNCTION, UNSPECIFIED ERECTILE DYSFUNCTION TYPE: ICD-10-CM

## 2020-02-28 RX ORDER — SILDENAFIL 100 MG/1
100 TABLET, FILM COATED ORAL DAILY PRN
Qty: 12 TABLET | Refills: 0 | Status: SHIPPED | OUTPATIENT
Start: 2020-02-28 | End: 2020-09-14

## 2020-03-03 ENCOUNTER — OFFICE VISIT (OUTPATIENT)
Dept: INTERNAL MEDICINE | Facility: CLINIC | Age: 45
End: 2020-03-03
Payer: OTHER GOVERNMENT

## 2020-03-03 VITALS
TEMPERATURE: 99 F | HEIGHT: 67 IN | DIASTOLIC BLOOD PRESSURE: 78 MMHG | OXYGEN SATURATION: 97 % | WEIGHT: 218.06 LBS | SYSTOLIC BLOOD PRESSURE: 124 MMHG | HEART RATE: 81 BPM | BODY MASS INDEX: 34.22 KG/M2

## 2020-03-03 DIAGNOSIS — J06.9 UPPER RESPIRATORY TRACT INFECTION, UNSPECIFIED TYPE: Primary | ICD-10-CM

## 2020-03-03 LAB
INFLUENZA A, MOLECULAR: NEGATIVE
INFLUENZA B, MOLECULAR: NEGATIVE
SPECIMEN SOURCE: NORMAL

## 2020-03-03 PROCEDURE — 99213 PR OFFICE/OUTPT VISIT, EST, LEVL III, 20-29 MIN: ICD-10-PCS | Mod: S$PBB,,, | Performed by: FAMILY MEDICINE

## 2020-03-03 PROCEDURE — 99999 PR PBB SHADOW E&M-EST. PATIENT-LVL IV: CPT | Mod: PBBFAC,,, | Performed by: FAMILY MEDICINE

## 2020-03-03 PROCEDURE — 99214 OFFICE O/P EST MOD 30 MIN: CPT | Mod: PBBFAC | Performed by: FAMILY MEDICINE

## 2020-03-03 PROCEDURE — 99213 OFFICE O/P EST LOW 20 MIN: CPT | Mod: S$PBB,,, | Performed by: FAMILY MEDICINE

## 2020-03-03 PROCEDURE — 87502 INFLUENZA DNA AMP PROBE: CPT

## 2020-03-03 PROCEDURE — 99999 PR PBB SHADOW E&M-EST. PATIENT-LVL IV: ICD-10-PCS | Mod: PBBFAC,,, | Performed by: FAMILY MEDICINE

## 2020-03-03 NOTE — PROGRESS NOTES
Subjective:       Patient ID: Elder Hernandez is a 44 y.o. male.    Chief Complaint: URI  45 yo male here with illness since Thursday, body aches, congestion, runny nose, sneezing coughing. Symptoms have been getting worse. Subjective fever last night. Took Nyquil last night. Recently traveled by plane from [place]. No known exposure to COVID19 positive persons or recent travel to high risk countries.     does not have any pertinent problems on file.  Past Medical History:   Diagnosis Date    Acid reflux     Gout     Hypertension     Kidney problem     PTSD (post-traumatic stress disorder)     Sleep apnea      Past Surgical History:   Procedure Laterality Date    COLONOSCOPY N/A 10/8/2019    Procedure: COLONOSCOPY;  Surgeon: Niraj Grey MD;  Location: [facility];  Service: Endoscopy;  Laterality: N/A;    RENAL BIOPSY Right 2015    SHOULDER SURGERY Left 2015     Family History   Problem Relation Age of Onset    No Known Problems Mother     No Known Problems Father      Social History     Socioeconomic History    Marital status:      Spouse name: Not on file    Number of children: 4    Years of education: Not on file    Highest education level: Not on file   Occupational History    Occupation: [employer]   Social Needs    Financial resource strain: Not on file    Food insecurity:     Worry: Not on file     Inability: Not on file    Transportation needs:     Medical: Not on file     Non-medical: Not on file   Tobacco Use    Smoking status: Never Smoker    Smokeless tobacco: Never Used   Substance and Sexual Activity    Alcohol use: Yes     Comment: socially    Drug use: Never    Sexual activity: Yes     Partners: Female     Birth control/protection: None   Lifestyle    Physical activity:     Days per week: Not on file     Minutes per session: Not on file    Stress: Rather much   Relationships    Social connections:     Talks on phone: Not on file     Gets together: Not on file      Attends Hindu service: Not on file     Active member of club or organization: Not on file     Attends meetings of clubs or organizations: Not on file     Relationship status: Not on file   Other Topics Concern    Not on file   Social History Narrative    Not on file     Review of Systems   Constitutional: Positive for appetite change and fatigue. Negative for chills and fever.   HENT: Positive for congestion, rhinorrhea and sore throat. Negative for sinus pressure and sinus pain.    Respiratory: Positive for cough. Negative for chest tightness, shortness of breath and wheezing.    Cardiovascular: Negative for chest pain and palpitations.   Gastrointestinal: Negative for abdominal pain, diarrhea, nausea and vomiting.   Endocrine: Positive for cold intolerance. Negative for heat intolerance.   Genitourinary: Negative for dysuria and urgency.   Musculoskeletal: Positive for myalgias. Negative for arthralgias.   Skin: Negative for color change and rash.   Neurological: Negative for dizziness and light-headedness.   Psychiatric/Behavioral: Positive for sleep disturbance. Negative for dysphoric mood. The patient is not nervous/anxious.    All other systems reviewed and are negative.      Objective:     Vitals:    03/03/20 1114   BP: 124/78   Pulse: 81   Temp: 99.4 °F (37.4 °C)        Physical Exam   Constitutional: He is oriented to person, place, and time. He appears well-developed and well-nourished. No distress.   HENT:   Head: Normocephalic and atraumatic.   Right Ear: Tympanic membrane normal.   Left Ear: Tympanic membrane normal.   Nose: Mucosal edema and rhinorrhea present. Right sinus exhibits maxillary sinus tenderness. Right sinus exhibits no frontal sinus tenderness. Left sinus exhibits maxillary sinus tenderness. Left sinus exhibits no frontal sinus tenderness.   Mouth/Throat: Posterior oropharyngeal erythema present. No oropharyngeal exudate, posterior oropharyngeal edema or tonsillar abscesses.    Eyes: Pupils are equal, round, and reactive to light. EOM are normal.   Neck: Normal range of motion. Neck supple.   Cardiovascular: Normal rate, regular rhythm, normal heart sounds and intact distal pulses.   No murmur heard.  Pulmonary/Chest: Effort normal and breath sounds normal. No stridor. No respiratory distress.   Musculoskeletal: Normal range of motion. He exhibits no edema or tenderness.   Neurological: He is alert and oriented to person, place, and time.   Skin: Skin is warm and dry. No pallor.   Psychiatric: He has a normal mood and affect. His behavior is normal.       Assessment:       1. Upper respiratory tract infection, unspecified type        Plan:           Problem List Items Addressed This Visit     None      Visit Diagnoses     Upper respiratory tract infection, unspecified type    -  Primary    Relevant Orders    Influenza A & B by Molecular (Completed)      Flu A and B negative; counseled on home care for viral URI. ER precautions given

## 2020-03-06 ENCOUNTER — OFFICE VISIT (OUTPATIENT)
Dept: INTERNAL MEDICINE | Facility: CLINIC | Age: 45
End: 2020-03-06
Payer: OTHER GOVERNMENT

## 2020-03-06 ENCOUNTER — LAB VISIT (OUTPATIENT)
Dept: LAB | Facility: HOSPITAL | Age: 45
End: 2020-03-06
Attending: INTERNAL MEDICINE
Payer: OTHER GOVERNMENT

## 2020-03-06 VITALS
BODY MASS INDEX: 34.46 KG/M2 | TEMPERATURE: 98 F | OXYGEN SATURATION: 97 % | SYSTOLIC BLOOD PRESSURE: 130 MMHG | HEART RATE: 66 BPM | WEIGHT: 220 LBS | DIASTOLIC BLOOD PRESSURE: 94 MMHG

## 2020-03-06 DIAGNOSIS — M1A.9XX0 CHRONIC GOUT WITHOUT TOPHUS, UNSPECIFIED CAUSE, UNSPECIFIED SITE: ICD-10-CM

## 2020-03-06 DIAGNOSIS — G43.109 MIGRAINE WITH AURA AND WITHOUT STATUS MIGRAINOSUS, NOT INTRACTABLE: ICD-10-CM

## 2020-03-06 DIAGNOSIS — N05.1 FSGS (FOCAL SEGMENTAL GLOMERULOSCLEROSIS): ICD-10-CM

## 2020-03-06 DIAGNOSIS — N18.9 CHRONIC KIDNEY DISEASE, UNSPECIFIED CKD STAGE: ICD-10-CM

## 2020-03-06 DIAGNOSIS — F43.10 PTSD (POST-TRAUMATIC STRESS DISORDER): ICD-10-CM

## 2020-03-06 DIAGNOSIS — I10 ESSENTIAL HYPERTENSION: ICD-10-CM

## 2020-03-06 DIAGNOSIS — N40.0 BENIGN PROSTATIC HYPERPLASIA WITHOUT LOWER URINARY TRACT SYMPTOMS: ICD-10-CM

## 2020-03-06 DIAGNOSIS — K58.0 IRRITABLE BOWEL SYNDROME WITH DIARRHEA: ICD-10-CM

## 2020-03-06 DIAGNOSIS — Z23 NEED FOR PNEUMOCOCCAL VACCINATION: ICD-10-CM

## 2020-03-06 DIAGNOSIS — Z00.00 ROUTINE GENERAL MEDICAL EXAMINATION AT A HEALTH CARE FACILITY: Primary | ICD-10-CM

## 2020-03-06 DIAGNOSIS — Z00.00 ROUTINE GENERAL MEDICAL EXAMINATION AT A HEALTH CARE FACILITY: ICD-10-CM

## 2020-03-06 PROBLEM — R10.9 ABDOMINAL PAIN: Status: RESOLVED | Noted: 2019-10-08 | Resolved: 2020-03-06

## 2020-03-06 PROBLEM — R10.30 LOWER ABDOMINAL PAIN: Status: RESOLVED | Noted: 2019-10-07 | Resolved: 2020-03-06

## 2020-03-06 PROBLEM — M62.82 RHABDOMYOLYSIS: Status: RESOLVED | Noted: 2019-08-26 | Resolved: 2020-03-06

## 2020-03-06 PROBLEM — R19.7 DIARRHEA: Status: RESOLVED | Noted: 2019-10-07 | Resolved: 2020-03-06

## 2020-03-06 PROBLEM — R19.8 ABDOMINAL WEAKNESS: Status: RESOLVED | Noted: 2020-01-23 | Resolved: 2020-03-06

## 2020-03-06 LAB
CHOLEST SERPL-MCNC: 172 MG/DL (ref 120–199)
CHOLEST/HDLC SERPL: 3 {RATIO} (ref 2–5)
COMPLEXED PSA SERPL-MCNC: 0.47 NG/ML (ref 0–4)
COMPLEXED PSA SERPL-MCNC: 0.47 NG/ML (ref 0–4)
HDLC SERPL-MCNC: 57 MG/DL (ref 40–75)
HDLC SERPL: 33.1 % (ref 20–50)
LDLC SERPL CALC-MCNC: 92.6 MG/DL (ref 63–159)
NONHDLC SERPL-MCNC: 115 MG/DL
TRIGL SERPL-MCNC: 112 MG/DL (ref 30–150)

## 2020-03-06 PROCEDURE — 99396 PREV VISIT EST AGE 40-64: CPT | Mod: S$PBB,,, | Performed by: INTERNAL MEDICINE

## 2020-03-06 PROCEDURE — 80061 LIPID PANEL: CPT

## 2020-03-06 PROCEDURE — 84153 ASSAY OF PSA TOTAL: CPT

## 2020-03-06 PROCEDURE — 99396 PR PREVENTIVE VISIT,EST,40-64: ICD-10-PCS | Mod: S$PBB,,, | Performed by: INTERNAL MEDICINE

## 2020-03-06 PROCEDURE — 90471 IMMUNIZATION ADMIN: CPT | Mod: PBBFAC

## 2020-03-06 PROCEDURE — 99999 PR PBB SHADOW E&M-EST. PATIENT-LVL III: CPT | Mod: PBBFAC,,, | Performed by: INTERNAL MEDICINE

## 2020-03-06 PROCEDURE — 36415 COLL VENOUS BLD VENIPUNCTURE: CPT

## 2020-03-06 PROCEDURE — 99213 OFFICE O/P EST LOW 20 MIN: CPT | Mod: PBBFAC,25 | Performed by: INTERNAL MEDICINE

## 2020-03-06 PROCEDURE — 99999 PR PBB SHADOW E&M-EST. PATIENT-LVL III: ICD-10-PCS | Mod: PBBFAC,,, | Performed by: INTERNAL MEDICINE

## 2020-03-06 RX ORDER — TOPIRAMATE 25 MG/1
25 TABLET ORAL 2 TIMES DAILY
Qty: 180 TABLET | Refills: 0 | Status: SHIPPED | OUTPATIENT
Start: 2020-03-06 | End: 2021-03-15 | Stop reason: SDUPTHER

## 2020-03-06 RX ORDER — FINASTERIDE 5 MG/1
5 TABLET, FILM COATED ORAL DAILY
Qty: 90 TABLET | Refills: 0 | Status: SHIPPED | OUTPATIENT
Start: 2020-03-06 | End: 2021-06-14 | Stop reason: SDUPTHER

## 2020-03-06 RX ORDER — LOSARTAN POTASSIUM 50 MG/1
50 TABLET ORAL DAILY
Qty: 90 TABLET | Refills: 3 | Status: CANCELLED | OUTPATIENT
Start: 2020-03-06 | End: 2021-03-06

## 2020-03-06 RX ORDER — TRAZODONE HYDROCHLORIDE 150 MG/1
150 TABLET ORAL NIGHTLY
Qty: 90 TABLET | Refills: 2 | Status: SHIPPED | OUTPATIENT
Start: 2020-03-06 | End: 2021-03-15 | Stop reason: SDUPTHER

## 2020-03-06 RX ORDER — LOSARTAN POTASSIUM 25 MG/1
25 TABLET ORAL DAILY
Qty: 90 TABLET | Refills: 3 | Status: CANCELLED | OUTPATIENT
Start: 2020-03-06

## 2020-03-06 RX ORDER — LOSARTAN POTASSIUM 50 MG/1
50 TABLET ORAL DAILY
Qty: 90 TABLET | Refills: 3 | Status: SHIPPED | OUTPATIENT
Start: 2020-03-06 | End: 2021-02-05 | Stop reason: SDUPTHER

## 2020-03-06 RX ORDER — PRAZOSIN HYDROCHLORIDE 2 MG/1
2 CAPSULE ORAL NIGHTLY
Qty: 90 CAPSULE | Refills: 3 | Status: SHIPPED | OUTPATIENT
Start: 2020-03-06 | End: 2021-06-14 | Stop reason: SDUPTHER

## 2020-03-06 RX ORDER — SERTRALINE HYDROCHLORIDE 100 MG/1
100 TABLET, FILM COATED ORAL DAILY
Qty: 90 TABLET | Refills: 3 | Status: SHIPPED | OUTPATIENT
Start: 2020-03-06 | End: 2021-03-15 | Stop reason: SDUPTHER

## 2020-03-06 NOTE — PROGRESS NOTES
Subjective:      Patient ID: Elder Hernandez is a 44 y.o. male.    Chief Complaint: Follow-up    Urinary Frequency    This is a chronic problem. The current episode started more than 1 year ago. The problem occurs intermittently. The problem has been waxing and waning. Quality: none. There has been no fever. He is sexually active. There is no history of pyelonephritis. Associated symptoms include frequency and hesitancy. Pertinent negatives include no behavior changes, chills, discharge, flank pain, hematuria, nausea, possible pregnancy, sweats, urgency, vomiting, weight loss, bubble bath use, constipation, rash or withholding. Treatments tried: alpha blocker. The treatment provided no relief. There is no history of hypertension, kidney stones, recurrent UTIs, STD, urinary stasis or a urological procedure.    Physical Exam   Constitutional: He is oriented to person, place, and time. He appears well-developed and well-nourished. No distress.   HENT:   Head: Normocephalic and atraumatic.   Mouth/Throat: Oropharynx is clear and moist.   Eyes: Pupils are equal, round, and reactive to light. EOM are normal.   Neck: Neck supple. No thyromegaly present.   Cardiovascular: Normal rate and regular rhythm.   Pulmonary/Chest: Breath sounds normal. He has no wheezes. He has no rales.   Abdominal: Soft. Bowel sounds are normal. There is no tenderness.   Genitourinary: Rectal exam shows no mass. Prostate is enlarged. Prostate is not tender.   Musculoskeletal: He exhibits no edema.   Lymphadenopathy:     He has no cervical adenopathy.   Neurological: He is alert and oriented to person, place, and time.   Skin: Skin is warm and dry.   Psychiatric: He has a normal mood and affect. His behavior is normal.     43 yo with   Patient Active Problem List   Diagnosis    PTSD (post-traumatic stress disorder)    Irritable bowel syndrome with diarrhea    Chronic kidney disease    Migraine with aura and without status migrainosus,  not intractable    Erectile dysfunction    HSV-2 infection    HTN (hypertension)    Benign prostatic hyperplasia without lower urinary tract symptoms    Arthritis of both knees    Gout    Bilateral foot pain    FSGS (focal segmental glomerulosclerosis)    Colon polyp    Muscle tone increased    Muscle tightness     Past Medical History:   Diagnosis Date    Acid reflux     Gout     Hypertension     Kidney problem     PTSD (post-traumatic stress disorder)     Sleep apnea      Here today for annual prev exam.  Compliant with meds without significant side effects. Energy and appetite are good.    Compliant with meds without significant side effects.     Pt also c/o Nocturia x1 , frequency, and hesitancy and incomplete emptying  for over one year.   Review of Systems   Constitutional: Negative for chills, fatigue, fever and weight loss.   HENT: Negative for ear pain and sore throat.    Respiratory: Negative for cough, shortness of breath and wheezing.    Cardiovascular: Negative for chest pain.   Gastrointestinal: Negative for abdominal pain, blood in stool, constipation, nausea and vomiting.   Genitourinary: Positive for frequency and hesitancy. Negative for discharge, dysuria, flank pain, genital sores, hematuria, penile pain, penile swelling, scrotal swelling, testicular pain and urgency.   Skin: Negative for rash and wound.   Neurological: Negative for seizures and syncope.     Objective:   BP (!) 130/94 (BP Location: Left arm, Patient Position: Sitting, BP Method: Large (Manual))   Pulse 66   Temp 97.9 °F (36.6 °C) (Oral)   Wt 99.8 kg (220 lb 0.3 oz)   SpO2 97%   BMI 34.46 kg/m²     Physical Exam   Constitutional: He is oriented to person, place, and time. He appears well-developed and well-nourished. No distress.   HENT:   Head: Normocephalic and atraumatic.   Mouth/Throat: Oropharynx is clear and moist.   Eyes: Pupils are equal, round, and reactive to light. EOM are normal.   Neck: Neck  supple. No thyromegaly present.   Cardiovascular: Normal rate and regular rhythm.   Pulmonary/Chest: Breath sounds normal. He has no wheezes. He has no rales.   Abdominal: Soft. Bowel sounds are normal. There is no tenderness.   Genitourinary: Rectal exam shows no mass. Prostate is enlarged. Prostate is not tender.   Musculoskeletal: He exhibits no edema.   Lymphadenopathy:     He has no cervical adenopathy.   Neurological: He is alert and oriented to person, place, and time.   Skin: Skin is warm and dry.   Psychiatric: He has a normal mood and affect. His behavior is normal.       Assessment:     1. Routine general medical examination at a health care facility    2. Essential hypertension    3. Chronic kidney disease, unspecified CKD stage    4. FSGS (focal segmental glomerulosclerosis)    5. Benign prostatic hyperplasia without lower urinary tract symptoms    6. Chronic gout without tophus, unspecified cause, unspecified site    7. Migraine with aura and without status migrainosus, not intractable    8. PTSD (post-traumatic stress disorder)    9. Need for pneumococcal vaccination    10. Irritable bowel syndrome with diarrhea      Plan:   Routine general medical examination at a health care facility  Heart healthy diet and reg exercise  HM reviewed    -     Lipid panel; Future; Expected date: 03/06/2020  -     PSA, Screening; Future; Expected date: 03/06/2020    Essential hypertension  Not at goal. Increase losartan to 50mg Cont dig htn program  -     losartan (COZAAR) 50 MG tablet; Take 1 tablet (50 mg total) by mouth once daily.  Dispense: 90 tablet; Refill: 3    FSGS (focal segmental glomerulosclerosis)  As per renal    Benign prostatic hyperplasia without lower urinary tract symptoms  Not dx. Start proscar. psa today. New baseline psa in 6 months.   -     prazosin (MINIPRESS) 2 MG Cap; Take 1 capsule (2 mg total) by mouth every evening.  Dispense: 90 capsule; Refill: 3  -     PSA, Screening; Future; Expected  date: 09/06/2020    Chronic gout without tophus, unspecified cause, unspecified site  No recent flares    Migraine with aura and without status migrainosus, not intractable  Stable  -     topiramate (TOPAMAX) 25 MG tablet; Take one tablet at night for first week, then take 1 tablet (25 mg total) by mouth 2 (two) times daily.  Dispense: 180 tablet; Refill: 0    PTSD (post-traumatic stress disorder)  Stable    -     traZODone (DESYREL) 150 MG tablet; Take 1 tablet (150 mg total) by mouth every evening.  Dispense: 90 tablet; Refill: 2  -     sertraline (ZOLOFT) 100 MG tablet; Take 1 tablet (100 mg total) by mouth once daily.  Dispense: 90 tablet; Refill: 3    Need for pneumococcal vaccination    Irritable bowel syndrome with diarrhea  As per gi    Other orders  -     (In Office Administered) Pneumococcal Conjugate Vaccine (13 Valent) (IM)  -     finasteride (PROSCAR) 5 mg tablet; Take 1 tablet (5 mg total) by mouth once daily.  Dispense: 90 tablet; Refill: 0        Lab Frequency Next Occurrence   Urinalysis Once 08/26/2019   X-Ray Foot Complete Left Once 12/19/2019   Uric acid Once 02/05/2020   Ambulatory referral/consult to Genetics Once 02/18/2020   Renal function panel Once 02/11/2020   CK Once 02/11/2020   Urinalysis Once 02/11/2020   CK     Angiotensin converting enzyme     C-reactive protein     Sedimentation rate     Comprehensive metabolic panel     Comprehensive metabolic panel         Problem List Items Addressed This Visit        Neuro    Migraine with aura and without status migrainosus, not intractable    Relevant Medications    topiramate (TOPAMAX) 25 MG tablet       Psychiatric    PTSD (post-traumatic stress disorder)    Overview     History of ambien 2 to 3 times per week, changed to trazodone 50 in march.          Relevant Medications    traZODone (DESYREL) 150 MG tablet    sertraline (ZOLOFT) 100 MG tablet       Cardiac/Vascular    HTN (hypertension)    Relevant Medications    losartan (COZAAR) 50 MG  tablet       Renal/    Chronic kidney disease    Benign prostatic hyperplasia without lower urinary tract symptoms    Relevant Medications    prazosin (MINIPRESS) 2 MG Cap    Other Relevant Orders    PSA, Screening (Completed)    FSGS (focal segmental glomerulosclerosis)       GI    Irritable bowel syndrome with diarrhea       Orthopedic    Gout      Other Visit Diagnoses     Routine general medical examination at a health care facility    -  Primary    Relevant Orders    Lipid panel (Completed)    PSA, Screening (Completed)    Need for pneumococcal vaccination              Follow up in about 6 weeks (around 4/17/2020), or if symptoms worsen or fail to improve.

## 2020-03-11 ENCOUNTER — OFFICE VISIT (OUTPATIENT)
Dept: PODIATRY | Facility: CLINIC | Age: 45
End: 2020-03-11
Payer: OTHER GOVERNMENT

## 2020-03-11 ENCOUNTER — PATIENT OUTREACH (OUTPATIENT)
Dept: ADMINISTRATIVE | Facility: OTHER | Age: 45
End: 2020-03-11

## 2020-03-11 ENCOUNTER — TELEPHONE (OUTPATIENT)
Dept: ORTHOPEDICS | Facility: CLINIC | Age: 45
End: 2020-03-11

## 2020-03-11 VITALS
BODY MASS INDEX: 34.53 KG/M2 | HEIGHT: 67 IN | DIASTOLIC BLOOD PRESSURE: 80 MMHG | SYSTOLIC BLOOD PRESSURE: 113 MMHG | HEART RATE: 78 BPM | WEIGHT: 220 LBS

## 2020-03-11 DIAGNOSIS — M25.562 PAIN IN BOTH KNEES, UNSPECIFIED CHRONICITY: Primary | ICD-10-CM

## 2020-03-11 DIAGNOSIS — M76.821 POSTERIOR TIBIAL TENDON DYSFUNCTION (PTTD) OF BOTH LOWER EXTREMITIES: ICD-10-CM

## 2020-03-11 DIAGNOSIS — M24.573 EQUINUS CONTRACTURE OF ANKLE: ICD-10-CM

## 2020-03-11 DIAGNOSIS — M72.2 PLANTAR FASCIITIS: Primary | ICD-10-CM

## 2020-03-11 DIAGNOSIS — M21.42 PES PLANUS OF BOTH FEET: ICD-10-CM

## 2020-03-11 DIAGNOSIS — M79.671 INFLAMMATORY HEEL PAIN, RIGHT: ICD-10-CM

## 2020-03-11 DIAGNOSIS — M21.41 PES PLANUS OF BOTH FEET: ICD-10-CM

## 2020-03-11 DIAGNOSIS — M76.822 POSTERIOR TIBIAL TENDON DYSFUNCTION (PTTD) OF BOTH LOWER EXTREMITIES: ICD-10-CM

## 2020-03-11 DIAGNOSIS — M25.561 PAIN IN BOTH KNEES, UNSPECIFIED CHRONICITY: Primary | ICD-10-CM

## 2020-03-11 PROCEDURE — 99214 OFFICE O/P EST MOD 30 MIN: CPT | Mod: 25,S$PBB,, | Performed by: PODIATRIST

## 2020-03-11 PROCEDURE — 99214 PR OFFICE/OUTPT VISIT, EST, LEVL IV, 30-39 MIN: ICD-10-PCS | Mod: 25,S$PBB,, | Performed by: PODIATRIST

## 2020-03-11 PROCEDURE — 99213 OFFICE O/P EST LOW 20 MIN: CPT | Mod: PBBFAC | Performed by: PODIATRIST

## 2020-03-11 PROCEDURE — 99999 PR PBB SHADOW E&M-EST. PATIENT-LVL III: ICD-10-PCS | Mod: PBBFAC,,, | Performed by: PODIATRIST

## 2020-03-11 PROCEDURE — 20550 NJX 1 TENDON SHEATH/LIGAMENT: CPT | Mod: S$PBB,RT,, | Performed by: PODIATRIST

## 2020-03-11 PROCEDURE — 20550 PR INJECT TENDON SHEATH/LIGAMENT: ICD-10-PCS | Mod: S$PBB,RT,, | Performed by: PODIATRIST

## 2020-03-11 PROCEDURE — 99999 PR PBB SHADOW E&M-EST. PATIENT-LVL III: CPT | Mod: PBBFAC,,, | Performed by: PODIATRIST

## 2020-03-11 PROCEDURE — 20550 NJX 1 TENDON SHEATH/LIGAMENT: CPT | Mod: PBBFAC,RT | Performed by: PODIATRIST

## 2020-03-11 RX ADMIN — TRIAMCINOLONE ACETONIDE 40 MG: 40 INJECTION, SUSPENSION INTRA-ARTICULAR; INTRAMUSCULAR at 04:03

## 2020-03-11 NOTE — PROGRESS NOTES
Subjective:     Patient ID: Elder Hernandez is a 44 y.o. male.    Chief Complaint: Follow-up (f/u plantar fas. Pt states no improvement in both feet. R foot the worst. Rates pain 7/10 . Non diabetic Pt. Wears boots. PCP Dr Baltazar.)    Elder is a 44 y.o. male who presents to the clinic complaining of heel pain in right, especially with the first step in the morning. The pain is described as Aching and Tight. The onset of the pain was gradual and has worsened over the past several weeks. Elder rates the pain as 7/10. He denies a history of trauma. Prior treatments include stretching exercises and inserts with minimal relief on the right foot.     Patient Active Problem List   Diagnosis    PTSD (post-traumatic stress disorder)    Irritable bowel syndrome with diarrhea    Chronic kidney disease    Migraine with aura and without status migrainosus, not intractable    Erectile dysfunction    HSV-2 infection    HTN (hypertension)    Benign prostatic hyperplasia without lower urinary tract symptoms    Arthritis of both knees    Gout    Bilateral foot pain    FSGS (focal segmental glomerulosclerosis)    Colon polyp    Muscle tone increased    Muscle tightness       Medication List with Changes/Refills   Current Medications    AMLODIPINE (NORVASC) 2.5 MG TABLET    Take 1 tablet (2.5 mg total) by mouth once daily.    COLCHICINE (COLCRYS) 0.6 MG TABLET    Take 1 tablet (0.6 mg total) by mouth once daily.    DICLOFENAC SODIUM (VOLTAREN) 1 % GEL    Apply 2 grams topically once daily.    DICYCLOMINE (BENTYL) 20 MG TABLET    Take 1 tablet (20 mg total) by mouth 3 (three) times daily as needed (abdominal pain).    FINASTERIDE (PROSCAR) 5 MG TABLET    Take 1 tablet (5 mg total) by mouth once daily.    LOSARTAN (COZAAR) 50 MG TABLET    Take 1 tablet (50 mg total) by mouth once daily.    MELOXICAM (MOBIC) 15 MG TABLET    Take 1 tablet (15 mg total) by mouth once daily. For pain and inflammation     METHOCARBAMOL (ROBAXIN) 750 MG TAB    Take one or two tablets by mouth three times daily as needed for muscle spasm.    MULTIVITAMIN ORAL    Take 1 tablet by mouth once daily.    PRAZOSIN (MINIPRESS) 2 MG CAP    Take 1 capsule (2 mg total) by mouth every evening.    RIZATRIPTAN (MAXALT-MLT) 10 MG DISINTEGRATING TABLET    Take 1 tablet (10 mg total) by mouth daily as needed for Migraine. May repeat in 2 hours if needed    SERTRALINE (ZOLOFT) 100 MG TABLET    Take 1 tablet (100 mg total) by mouth once daily.    SILDENAFIL (VIAGRA) 100 MG TABLET    Take 1 tablet (100 mg total) by mouth daily as needed for Erectile Dysfunction.    TOPIRAMATE (TOPAMAX) 25 MG TABLET    Take one tablet at night for first week, then take 1 tablet (25 mg total) by mouth 2 (two) times daily.    TRAZODONE (DESYREL) 150 MG TABLET    Take 1 tablet (150 mg total) by mouth every evening.    VALACYCLOVIR (VALTREX) 500 MG TABLET    Take 1 tablet (500 mg total) by mouth 2 (two) times daily as needed.       Review of patient's allergies indicates:  No Known Allergies    Past Surgical History:   Procedure Laterality Date    COLONOSCOPY N/A 10/8/2019    Procedure: COLONOSCOPY;  Surgeon: Niraj Grey MD;  Location: [facility];  Service: Endoscopy;  Laterality: N/A;    RENAL BIOPSY Right 2015    SHOULDER SURGERY Left 2015       Family History   Problem Relation Age of Onset    No Known Problems Mother     No Known Problems Father        Social History     Socioeconomic History    Marital status:      Spouse name: Not on file    Number of children: 4    Years of education: Not on file    Highest education level: Not on file   Occupational History    Occupation: [employer]   Social Needs    Financial resource strain: Not on file    Food insecurity:     Worry: Not on file     Inability: Not on file    Transportation needs:     Medical: Not on file     Non-medical: Not on file   Tobacco Use    Smoking status: Never Smoker    Smokeless  "tobacco: Never Used   Substance and Sexual Activity    Alcohol use: Yes     Comment: socially    Drug use: Never    Sexual activity: Yes     Partners: Female     Birth control/protection: None   Lifestyle    Physical activity:     Days per week: Not on file     Minutes per session: Not on file    Stress: Rather much   Relationships    Social connections:     Talks on phone: Not on file     Gets together: Not on file     Attends Judaism service: Not on file     Active member of club or organization: Not on file     Attends meetings of clubs or organizations: Not on file     Relationship status: Not on file   Other Topics Concern    Not on file   Social History Narrative    Not on file       Vitals:    03/11/20 1632   BP: 113/80   Pulse: 78   Weight: 99.8 kg (220 lb 0.3 oz)   Height: 5' 7" (1.702 m)   PainSc:   7       Review of Systems   Constitutional: Negative for chills and fever.   Respiratory: Negative for shortness of breath.    Cardiovascular: Negative for chest pain, palpitations, orthopnea, claudication and leg swelling.   Gastrointestinal: Negative for diarrhea, nausea and vomiting.   Musculoskeletal: Negative for joint pain.   Skin: Negative for rash.   Neurological: Negative for dizziness, tingling, sensory change, focal weakness and weakness.   Psychiatric/Behavioral: Negative.            Objective:   PHYSICAL EXAM: Apperance: Alert and orient in no distress,well developed, and with good attention to grooming and body habits  Patient presents ambulating in army boots with inserts.   Lower Extremity Exam  VASCULAR: Dorsalis pedis pulses 2/4 bilateral and Posterior Tibial pulses 2/4 bilateral. Capillary fill time <4 seconds bilateral. No edema observed bilateral . Varicosities absent bilateral. Skin temperature of the lower extremities is warm to warm, proximal to distal. Hair growth WNLbilateral.  DERMATOLOGICAL: No skin rashes, subcutaneous nodules, lesions, or ulcers observed bilateral. "   NEUROLOGICAL: Light touch, sharp-dull, proprioception all present and equal bilaterally.   MUSCULOSKELETAL:Muscle strength is 5/5 for foot inverters, everters, plantarflexors, and dorsiflexors. Muscle tone is normal. Ankle joints bilateral shows decreased ROM. bilateral ankle ROM shows greater decrease in dorsiflexion with knee extended. Ankle joint ROM is pain free and without crepitus bilateral. Pain to palpation right plantar medial tubercle. Plantar medial aspect of bilateral heels shows tenderness to palpation. No pain on medial-lateral compression of the calcaneus.     TEST RESULTS: Radiographs of bilateral foot/ankle taken reveals Right: There is no radiographic evidence of acute osseous, articular, or soft tissue abnormality.  Joint spaces are preserved.  Left: There is no radiographic evidence of acute osseous, articular, or soft tissue abnormality.  Joint spaces are preserved.          Assessment:   The following prescriptions/orders were written today per listed diagnosis  Plantar fasciitis - Right Foot  -     triamcinolone acetonide injection 40 mg    Inflammatory heel pain, right - Right Foot  -     triamcinolone acetonide injection 40 mg    Equinus contracture of ankle  -     ORTHOTIC DEVICE (DME)    Posterior tibial tendon dysfunction (PTTD) of both lower extremities  -     ORTHOTIC DEVICE (DME)    Pes planus of both feet  -     ORTHOTIC DEVICE (DME)          Plan:   Plantar fasciitis - Right Foot  -     triamcinolone acetonide injection 40 mg    Inflammatory heel pain, right - Right Foot  -     triamcinolone acetonide injection 40 mg    Equinus contracture of ankle  -     ORTHOTIC DEVICE (DME)    Posterior tibial tendon dysfunction (PTTD) of both lower extremities  -     ORTHOTIC DEVICE (DME)    Pes planus of both feet  -     ORTHOTIC DEVICE (DME)      I counseled the patient on his conditions, regarding findings of my examination, my impressions, and usual treatment plan.   Reviewed bilateral foot  x-rays in exam room with patient.   I explained to the patient that etiology and treatment options for heel pain including rest,  ice messages, stretching exercises, strappings/tappings, NSAID's, injections, new shoegear with orthotic inserts, and/or surgical treatment.   Patient agreed to injection therapy today.  Patient agreed to injection therapy today. After sterilizing the area with an alcohol prep, the affected area was injected with a solution containing 1 cc of 1% lidocaine plain, 1cc of 0.5% Marcaine plain and 1 cc of Kenalog 40%.  Injection area was then covered with band-aid. The patient tolerated the injection well and reported comfort to the area.  I gave written and verbal instructions on heel cord stretching and this was demonstrated for the patient. Patient expressed understanding.  Patient instructed on adequate icing techniques. Patient should ice the affected area at least once per day x 10 minutes for 10 days . I advised the  patient that extra icing would also be beneficial to ensure adequate anti inflammatory effect.   The patient and I reviewed the types of shoes he should be wearing, my recommendation includes generally the best time of the day for a shoe fitting is the afternoon, shoes with a wide toe box, very good cushion, and tennis shoes with removable inner soles.   Prescription written for custom inserts.   Completed  paperwork for patient to refrain from 2 mile run, Maxium Deadlift and Sprint Drag Carry.  Patient to return in 1-2 months.                   Pradeep Da Silva DPM  [facility]

## 2020-03-12 RX ORDER — TRIAMCINOLONE ACETONIDE 40 MG/ML
40 INJECTION, SUSPENSION INTRA-ARTICULAR; INTRAMUSCULAR
Status: COMPLETED | OUTPATIENT
Start: 2020-03-11 | End: 2020-03-11

## 2020-03-13 ENCOUNTER — OFFICE VISIT (OUTPATIENT)
Dept: ORTHOPEDICS | Facility: CLINIC | Age: 45
End: 2020-03-13
Payer: OTHER GOVERNMENT

## 2020-03-13 ENCOUNTER — OFFICE VISIT (OUTPATIENT)
Dept: RHEUMATOLOGY | Facility: CLINIC | Age: 45
End: 2020-03-13
Payer: OTHER GOVERNMENT

## 2020-03-13 ENCOUNTER — PATIENT OUTREACH (OUTPATIENT)
Dept: OTHER | Facility: OTHER | Age: 45
End: 2020-03-13

## 2020-03-13 ENCOUNTER — HOSPITAL ENCOUNTER (OUTPATIENT)
Dept: RADIOLOGY | Facility: HOSPITAL | Age: 45
Discharge: HOME | End: 2020-03-13
Attending: FAMILY MEDICINE
Payer: OTHER GOVERNMENT

## 2020-03-13 VITALS
HEIGHT: 67 IN | SYSTOLIC BLOOD PRESSURE: 122 MMHG | WEIGHT: 220 LBS | DIASTOLIC BLOOD PRESSURE: 87 MMHG | HEART RATE: 58 BPM | BODY MASS INDEX: 34.53 KG/M2

## 2020-03-13 VITALS
WEIGHT: 214.5 LBS | DIASTOLIC BLOOD PRESSURE: 90 MMHG | SYSTOLIC BLOOD PRESSURE: 140 MMHG | BODY MASS INDEX: 33.6 KG/M2 | HEART RATE: 57 BPM

## 2020-03-13 DIAGNOSIS — M79.672 BILATERAL FOOT PAIN: ICD-10-CM

## 2020-03-13 DIAGNOSIS — M79.671 BILATERAL FOOT PAIN: ICD-10-CM

## 2020-03-13 DIAGNOSIS — R74.8 HYPERCKEMIA: ICD-10-CM

## 2020-03-13 DIAGNOSIS — M25.562 PAIN IN BOTH KNEES, UNSPECIFIED CHRONICITY: ICD-10-CM

## 2020-03-13 DIAGNOSIS — M17.0 ARTHRITIS OF BOTH KNEES: Primary | ICD-10-CM

## 2020-03-13 DIAGNOSIS — M10.9 GOUT, UNSPECIFIED CAUSE, UNSPECIFIED CHRONICITY, UNSPECIFIED SITE: Primary | ICD-10-CM

## 2020-03-13 DIAGNOSIS — Z71.89 COUNSELING ON HEALTH PROMOTION AND DISEASE PREVENTION: ICD-10-CM

## 2020-03-13 DIAGNOSIS — M25.561 PAIN IN BOTH KNEES, UNSPECIFIED CHRONICITY: ICD-10-CM

## 2020-03-13 DIAGNOSIS — M17.0 OSTEOARTHRITIS OF BOTH KNEES, UNSPECIFIED OSTEOARTHRITIS TYPE: Primary | ICD-10-CM

## 2020-03-13 PROCEDURE — 73564 X-RAY EXAM KNEE 4 OR MORE: CPT | Mod: TC,50

## 2020-03-13 PROCEDURE — 99214 OFFICE O/P EST MOD 30 MIN: CPT | Mod: PBBFAC,25 | Performed by: FAMILY MEDICINE

## 2020-03-13 PROCEDURE — 99213 OFFICE O/P EST LOW 20 MIN: CPT | Mod: PBBFAC,25,27 | Performed by: INTERNAL MEDICINE

## 2020-03-13 PROCEDURE — 99214 OFFICE O/P EST MOD 30 MIN: CPT | Mod: S$PBB,,, | Performed by: FAMILY MEDICINE

## 2020-03-13 PROCEDURE — 73564 X-RAY EXAM KNEE 4 OR MORE: CPT | Mod: 26,50,, | Performed by: RADIOLOGY

## 2020-03-13 PROCEDURE — 99214 OFFICE O/P EST MOD 30 MIN: CPT | Mod: S$PBB,,, | Performed by: INTERNAL MEDICINE

## 2020-03-13 PROCEDURE — 99214 PR OFFICE/OUTPT VISIT, EST, LEVL IV, 30-39 MIN: ICD-10-PCS | Mod: S$PBB,,, | Performed by: INTERNAL MEDICINE

## 2020-03-13 PROCEDURE — 99214 PR OFFICE/OUTPT VISIT, EST, LEVL IV, 30-39 MIN: ICD-10-PCS | Mod: S$PBB,,, | Performed by: FAMILY MEDICINE

## 2020-03-13 PROCEDURE — 99999 PR PBB SHADOW E&M-EST. PATIENT-LVL III: CPT | Mod: PBBFAC,,, | Performed by: INTERNAL MEDICINE

## 2020-03-13 PROCEDURE — 99999 PR PBB SHADOW E&M-EST. PATIENT-LVL III: ICD-10-PCS | Mod: PBBFAC,,, | Performed by: INTERNAL MEDICINE

## 2020-03-13 PROCEDURE — 99999 PR PBB SHADOW E&M-EST. PATIENT-LVL IV: ICD-10-PCS | Mod: PBBFAC,,, | Performed by: FAMILY MEDICINE

## 2020-03-13 PROCEDURE — 73564 XR KNEE ORTHO BILAT WITH FLEXION: ICD-10-PCS | Mod: 26,50,, | Performed by: RADIOLOGY

## 2020-03-13 PROCEDURE — 99999 PR PBB SHADOW E&M-EST. PATIENT-LVL IV: CPT | Mod: PBBFAC,,, | Performed by: FAMILY MEDICINE

## 2020-03-13 RX ORDER — FEBUXOSTAT 40 MG/1
40 TABLET, FILM COATED ORAL DAILY
Qty: 30 TABLET | Refills: 2 | Status: SHIPPED | OUTPATIENT
Start: 2020-03-13 | End: 2020-04-24

## 2020-03-13 NOTE — PATIENT INSTRUCTIONS
If you are experiencing pain/discomfort ,or have questions after 5pm and would like to be connected to the [facility] [place] on-call team, please call this number and specify which Sports Medicine provider is treating you: [phone]    Recheck in a few weeks if we are able to get has Visco supplementation order approved by insurance.    Recheck at any time if desires further evaluation or treatment for his knees or other joints.

## 2020-03-13 NOTE — PROGRESS NOTES
Digital Medicine: Clinician Follow-Up      Called patient to follow up. BP remains elevated above goal. Discussed with patient that he remains near where I evaluated his level of control to be in December. We also reviewed his recent BP readings in clinic, which mirror his home readings.     Patient endorses adherence to medication regimen. Patient denies hypotensive s/sx (lightheadedness, dizziness, nausea, fatigue); patient denies hypertensive s/sx (SOB, CP, severe headaches, changes in vision). Instructed patient to seek medical care if BP > 160/100 and is accompanied by hypertensive s/sx associated, patient confirms understanding.     Reviewed recent readings. Per 2017 ACC/ AHA HTN guidelines (goal of BP < 130/80), current 30-day average is above goal, with DBP .       The history is provided by the patient.     Follow Up  Follow-up reason(s): reading review and routine education      Readings are trending up       INTERVENTION(S)  reviewed appropriate dose schedule, reviewed monitoring technique, recommended med change - patient refuses and encouragement/support    PLAN  patient verbalizes understanding, patient refuses additional therapy, Health  follow up and continue monitoring    Continue current medication regimen.Patient requests 1-2 weeks to work on resting prior to reading before making a medication change.  I will continue to monitor regularly and will follow-up in 1 to 2 weeks, with plans to increase amlodipine to 5 mg daily.        Patient denies having questions or concerns. Patient has my contact information and knows to call with any concerns or clinical changes.        There are no preventive care reminders to display for this patient.    Last 5 Patient Entered Readings                                      Current 30 Day Average: 140/92     Recent Readings 3/13/2020 3/12/2020 3/11/2020 3/10/2020 3/9/2020    SBP (mmHg) 129 106 138 150 156    DBP (mmHg) 96 64 92 93 100    Pulse 88 127 113 67  77             Hypertension Medications             amLODIPine (NORVASC) 2.5 MG tablet Take 1 tablet (2.5 mg total) by mouth once daily.    losartan (COZAAR) 50 MG tablet Take 1 tablet (50 mg total) by mouth once daily.    prazosin (MINIPRESS) 2 MG Cap Take 1 capsule (2 mg total) by mouth every evening.                 Screenings

## 2020-03-13 NOTE — PATIENT INSTRUCTIONS
Febuxostat oral tablets  What is this medicine?  FEBUXOSTAT (feb UX oh stat) is used to treat gout. People with gout have too much uric acid in their body. This medicine works to lower how much uric acid the body makes.  How should I use this medicine?  Take this medicine by mouth with a glass of water. Follow the directions on the prescription label. You can take it with or without food. Take your medicine at regular intervals. Do not take it more often than directed. Do not stop taking except on your doctor's advice.  Talk to your pediatrician regarding the use of this medicine in children. This medicine is not approved for use in children.  What side effects may I notice from receiving this medicine?  Side effects that you should report to your doctor or health care professional as soon as possible:  · allergic reactions like skin rash, itching or hives, swelling of the face, lips, or tongue  · breathing problems  · changes in vision  · chest pain  · confusion, trouble speaking or understanding  · dizziness  · fast, irregular heartbeat  · feeling faint or lightheaded, falls  · gout pain  · muscle aches or pains  · severe headaches  · sudden numbness or weakness of the face, arm or leg  · trouble walking, dizziness, loss of balance or coordination  · unusually weak or tired  · yellowing of the eyes or skin  Side effects that usually do not require medical attention (Report these to your doctor or health care professional if they continue or are bothersome.):  · changes in appetite  · constipation or diarrhea  · nausea  · red, hot flush to face or skin  · stomach upset or pain  What may interact with this medicine?  Do not take this medicine with any of the following medications:  · azathioprine  · mercaptopurine  · theophylline  This medicine may also interact with the following medications:  · certain medicines used to treat gout  · medicines used to treat cancer  · rasburicase  What if I miss a dose?  If you  miss a dose, take it as soon as you can. If it is almost time for your next dose, take only that dose. Do not take double or extra doses.  Where should I keep my medicine?  Keep out of the reach of children.  Store at room temperature between 15 and 30 degrees C (59 and 86 degrees F). Protect from light and moisture. Throw away any unused medicine after the expiration date.  What should I tell my health care provider before I take this medicine?  They need to know if you have any of these conditions:  · heart disease  · history of stroke  · kidney disease  · liver disease  · taking theophylline, azathioprine, or mercaptopurine  · an unusual or allergic reaction to febuxostat, other medicines, foods, dyes, or preservatives  · pregnant or trying to get pregnant  · breast feeding  What should I watch for while using this medicine?  Visit your doctor or health care professional for regular checks on your progress. You will need regular blood tests while you are taking this medicine. Your gout may flare up while you are taking this medicine. If you have gout pain, do not stop taking this medicine. You may need to take another medicine with this medicine for gout pain.  NOTE:This sheet is a summary. It may not cover all possible information. If you have questions about this medicine, talk to your doctor, pharmacist, or health care provider. Copyright© 2017 [employer]

## 2020-03-13 NOTE — PROGRESS NOTES
RHEUMATOLOGY OUTPATIENT CLINIC NOTE    3/13/2020    Attending Rheumatologist: Mannie Holland  Primary Care Provider: Tommie Baltazar MD   Physician Requesting Consultation: No referring provider defined for this encounter.  Chief Complaint/Reason For Consultation:  Bursitis (pain in both arms today) and Hypercalcemia    Subjective:       HPI  Elder Hernandez is a 44 y.o. Black or  male hyper CK Gloria and gout comes for follow-up.    Today  Last seen on February.  Extended gout flare status post trauma on elbows, recommendations given 12 were flare.  Instructed to increase allopurinol to 300mg daily for goal of uric acid less than 6 mg/dL.  No acute complaints.  Refers was instructed by Nephrology to decrease allopurinol back to 150 mg daily.  Denies new joint pain or swelling.  Refers episodic bilateral elbow pain with mechanical features, mostly after activity.  Relieved somewhat by rest.  Denies new gout flares, fever, or having  or GI complaints.    Review of Systems   Constitutional: Negative for chills, fever and malaise/fatigue.   Eyes: Negative for pain and redness.   Respiratory: Negative for cough, hemoptysis and shortness of breath.    Cardiovascular: Negative for chest pain and leg swelling.   Gastrointestinal: Negative for abdominal pain, blood in stool and melena.   Genitourinary: Negative for dysuria and hematuria.   Musculoskeletal: Negative for falls and joint pain.   Skin: Negative for rash.   Neurological: Negative for tingling and focal weakness.   Psychiatric/Behavioral: Negative for memory loss. The patient does not have insomnia.      Chronic comorbid conditions affecting medical decision making today:  Past Medical History:   Diagnosis Date    Acid reflux     Gout     Hypertension     Kidney problem     PTSD (post-traumatic stress disorder)     Sleep apnea    · FSGS  · History of rhabdomyolysis    Past Surgical History:   Procedure Laterality Date     COLONOSCOPY N/A 10/8/2019    Procedure: COLONOSCOPY;  Surgeon: Niraj Grey MD;  Location: [facility];  Service: Endoscopy;  Laterality: N/A;    RENAL BIOPSY Right 2015    SHOULDER SURGERY Left 2015     Family History   Problem Relation Age of Onset    No Known Problems Mother     No Known Problems Father      Social History     Substance and Sexual Activity   Alcohol Use Yes    Comment: socially     Social History     Tobacco Use   Smoking Status Never Smoker   Smokeless Tobacco Never Used     Social History     Substance and Sexual Activity   Drug Use Never       Current Outpatient Medications:     amLODIPine (NORVASC) 2.5 MG tablet, Take 1 tablet (2.5 mg total) by mouth once daily., Disp: 90 tablet, Rfl: 1    diclofenac sodium (VOLTAREN) 1 % Gel, Apply 2 grams topically once daily., Disp: 1 Tube, Rfl: 2    dicyclomine (BENTYL) 20 mg tablet, Take 1 tablet (20 mg total) by mouth 3 (three) times daily as needed (abdominal pain)., Disp: 90 tablet, Rfl: 0    losartan (COZAAR) 50 MG tablet, Take 1 tablet (50 mg total) by mouth once daily., Disp: 90 tablet, Rfl: 3    meloxicam (MOBIC) 15 MG tablet, Take 1 tablet (15 mg total) by mouth once daily. For pain and inflammation, Disp: 15 tablet, Rfl: 0    methocarbamol (ROBAXIN) 750 MG Tab, Take one or two tablets by mouth three times daily as needed for muscle spasm., Disp: 30 tablet, Rfl: 0    MULTIVITAMIN ORAL, Take 1 tablet by mouth once daily., Disp: , Rfl:     prazosin (MINIPRESS) 2 MG Cap, Take 1 capsule (2 mg total) by mouth every evening., Disp: 90 capsule, Rfl: 3    sertraline (ZOLOFT) 100 MG tablet, Take 1 tablet (100 mg total) by mouth once daily., Disp: 90 tablet, Rfl: 3    sildenafil (VIAGRA) 100 MG tablet, Take 1 tablet (100 mg total) by mouth daily as needed for Erectile Dysfunction., Disp: 12 tablet, Rfl: 0    topiramate (TOPAMAX) 25 MG tablet, Take one tablet at night for first week, then take 1 tablet (25 mg total) by mouth 2 (two) times  "daily., Disp: 180 tablet, Rfl: 0    traZODone (DESYREL) 150 MG tablet, Take 1 tablet (150 mg total) by mouth every evening., Disp: 90 tablet, Rfl: 2    valACYclovir (VALTREX) 500 MG tablet, Take 1 tablet (500 mg total) by mouth 2 (two) times daily as needed., Disp: 30 tablet, Rfl: 0    colchicine (COLCRYS) 0.6 mg tablet, Take 1 tablet (0.6 mg total) by mouth once daily., Disp: 30 tablet, Rfl: 2    febuxostat (ULORIC) 40 mg Tab, Take 1 tablet (40 mg total) by mouth once daily., Disp: 30 tablet, Rfl: 2    finasteride (PROSCAR) 5 mg tablet, Take 1 tablet (5 mg total) by mouth once daily. (Patient not taking: Reported on 3/13/2020), Disp: 90 tablet, Rfl: 0    rizatriptan (MAXALT-MLT) 10 MG disintegrating tablet, Take 1 tablet (10 mg total) by mouth daily as needed for Migraine. May repeat in 2 hours if needed, Disp: 30 tablet, Rfl: 0     Objective:         Vitals:    03/13/20 1238   BP: (!) 140/90   Pulse: (!) 57     Physical Exam   Constitutional: No distress.   Estimated body mass index is 33.6 kg/m² as calculated from the following:    Height as of an earlier encounter on 3/13/20: 5' 7" (1.702 m).    Weight as of this encounter: 97.3 kg (214 lb 8.1 oz).    Wt Readings from Last 1 Encounters:  03/13/20 1238 : 97.3 kg (214 lb 8.1 oz)     HENT:   Head: Normocephalic and atraumatic.   Eyes: Conjunctivae are normal. Pupils are equal, round, and reactive to light.   Neck: Normal range of motion.   Cardiovascular: Normal rate and intact distal pulses.    Pulmonary/Chest: Effort normal. No respiratory distress.   Abdominal: Soft. He exhibits no distension.   Neurological: He is alert. Gait normal.   Skin: No rash noted. No erythema.     Musculoskeletal: Normal range of motion. He exhibits edema (Olecranon ursa area b/l, greatly improved compared to last visit.). He exhibits no tenderness.   : strong  No synovitis or significant squeeze tenderness    AROM: intact  PROM: intact    Devices used by patient: none "       Reviewed old and all outside pertinent medical records available.    All lab results personally reviewed and interpreted by me.  Lab Results   Component Value Date    WBC 10.23 05/06/2019    HGB 14.2 05/06/2019    HCT 39.6 (L) 05/06/2019    MCV 88 05/06/2019    MCH 31.7 (H) 05/06/2019    MCHC 35.9 05/06/2019    RDW 13.8 05/06/2019     05/06/2019    MPV 11.0 05/06/2019       Lab Results   Component Value Date     03/13/2020    K 4.2 03/13/2020     03/13/2020    CO2 26 03/13/2020    GLU 82 03/13/2020    BUN 16 03/13/2020    CALCIUM 9.3 03/13/2020    PROT 7.8 03/13/2020    ALBUMIN 3.7 03/13/2020    BILITOT 0.8 03/13/2020    AST 29 03/13/2020    ALKPHOS 73 03/13/2020    ALT 28 03/13/2020       Lab Results   Component Value Date    COLORU Yellow 02/04/2020    APPEARANCEUA Clear 02/04/2020    SPECGRAV 1.020 02/04/2020    PHUR 6.0 02/04/2020    PROTEINUA 2+ (A) 02/04/2020    KETONESU Negative 02/04/2020    LEUKOCYTESUR Negative 02/04/2020    NITRITE Negative 02/04/2020       Lab Results   Component Value Date    CRP 3.4 10/18/2019       Lab Results   Component Value Date    SEDRATE <2 10/18/2019       Lab Results   Component Value Date    SEDRATE <2 10/18/2019       No components found for: 25OHVITDTOT, 32DTKRDQ4, 31JBVPRL4, METHODNOTE    Lab Results   Component Value Date    URICACID 7.8 (H) 03/13/2020     No components found for: TSPOTTB    · CK: 830 (5/2019) -> 543 (8/2019)-> 707 (10/2019)-> 402  · Aldolase within reference value    Rheum Labs:   MARINA negative   RNP antibody negative   Myomarker panel 3 negative     Infectious Labs:   HIV NR     Imaging:  All imaging reviewed and independently  interpreted by me.    X-ray knees March 2020   Mild degenerative change throughout the remainder of the knee.     ASSESSMENT / PLAN:     Elder Hernandez is a 44 y.o. Black or  male with:    1. Chronic gout  - typical clinical presentation, hyperuricemia.  - has been on chronic  therapy with allopurinol 150 mg.   - Uric acid not at goal, please recommended to increase to 300 mg a remeasure uric acid level.  - refers was recommended to decrease dose of allopurinol by Nephrology.  - recommend to replace allopurinol with Uloric 40 mg daily.  - will remeasure uric acid level and CMP in around 6 weeks  - when continue with colchicine for prophylaxis and systemic or local corticosteroids for acute flares.  - clinical significance side effects of therapy discussed in detail.  - Dietary and lifestyle modifications  - continue with topical therapy and elbow protection.    2. HyperCKemia  - history of present illness and current findings no consistent with inflammatory myositis for which would recommend immunosuppression.    3. Other specified counseling  - over 10 minutes spent regarding below topics:  - Nutrition and exercise counseling.  - Limitation of alcohol consumption.  - Medication counseling provided.    Follow up in about 3 months (around 6/13/2020).    Method of contact with patient concerns: [facility]    Disclaimer:  This note is prepared using voice recognition software and as such is likely to have errors and has not been proof read. Please contact me for questions.     Time spent: 25 minutes in face to face discussion concerning diagnosis, prognosis, review of lab and test results, benefits of treatment as well as management of disease, counseling of patient and coordination of care between various health care providers.  Greater than half the time spent was used for coordination of care and counseling of patient.    Mannie Holland M.D.  Rheumatology Department   [facility]

## 2020-03-13 NOTE — PROGRESS NOTES
Subjective:     Patient ID: Elder Hernandez is a 44 y.o. male.    Chief Complaint: Pain of the Left Knee and Pain of the Right Knee      HPI:  Patient scheduled for recheck of his knees today but produces multiple forms that he needs completed for the  having to do with particular exercises he can do or not do based on his limitations specifically regarding his knee problems.  He has knee problems have been stable recently and he regularly treats them with ice occasion and occasionally over-the-counter Tylenol or ibuprofen.    He has had the forms evaluated by his podiatrist for his ft issues and his primary care physician will complete the forms from an overall medical standpoint.    We have gone over the various exercises 1 x 1 and he  advised me of certain ones which he has tried before and cannot do without pain or swelling to his knee joints.  I have made recommendation for alternative exercise such as bike riding or rowing.  He is also able to do walking  as long as at his own pace.  He should definitely avoid deep knee bend and, squat type exercises.  Past Medical History:   Diagnosis Date    Acid reflux     Gout     Hypertension     Kidney problem     PTSD (post-traumatic stress disorder)     Sleep apnea      Past Surgical History:   Procedure Laterality Date    COLONOSCOPY N/A 10/8/2019    Procedure: COLONOSCOPY;  Surgeon: Niraj Grey MD;  Location: [facility];  Service: Endoscopy;  Laterality: N/A;    RENAL BIOPSY Right 2015    SHOULDER SURGERY Left 2015     Family History   Problem Relation Age of Onset    No Known Problems Mother     No Known Problems Father      Social History     Socioeconomic History    Marital status:      Spouse name: Not on file    Number of children: 4    Years of education: Not on file    Highest education level: Not on file   Occupational History    Occupation: [employer]   Social Needs    Financial resource strain: Not on file    Food  insecurity:     Worry: Not on file     Inability: Not on file    Transportation needs:     Medical: Not on file     Non-medical: Not on file   Tobacco Use    Smoking status: Never Smoker    Smokeless tobacco: Never Used   Substance and Sexual Activity    Alcohol use: Yes     Comment: socially    Drug use: Never    Sexual activity: Yes     Partners: Female     Birth control/protection: None   Lifestyle    Physical activity:     Days per week: Not on file     Minutes per session: Not on file    Stress: Rather much   Relationships    Social connections:     Talks on phone: Not on file     Gets together: Not on file     Attends Temple service: Not on file     Active member of club or organization: Not on file     Attends meetings of clubs or organizations: Not on file     Relationship status: Not on file   Other Topics Concern    Not on file   Social History Narrative    Not on file       Current Outpatient Medications:     amLODIPine (NORVASC) 2.5 MG tablet, Take 1 tablet (2.5 mg total) by mouth once daily., Disp: 90 tablet, Rfl: 1    diclofenac sodium (VOLTAREN) 1 % Gel, Apply 2 grams topically once daily., Disp: 1 Tube, Rfl: 2    dicyclomine (BENTYL) 20 mg tablet, Take 1 tablet (20 mg total) by mouth 3 (three) times daily as needed (abdominal pain)., Disp: 90 tablet, Rfl: 0    finasteride (PROSCAR) 5 mg tablet, Take 1 tablet (5 mg total) by mouth once daily., Disp: 90 tablet, Rfl: 0    losartan (COZAAR) 50 MG tablet, Take 1 tablet (50 mg total) by mouth once daily., Disp: 90 tablet, Rfl: 3    meloxicam (MOBIC) 15 MG tablet, Take 1 tablet (15 mg total) by mouth once daily. For pain and inflammation, Disp: 15 tablet, Rfl: 0    methocarbamol (ROBAXIN) 750 MG Tab, Take one or two tablets by mouth three times daily as needed for muscle spasm., Disp: 30 tablet, Rfl: 0    MULTIVITAMIN ORAL, Take 1 tablet by mouth once daily., Disp: , Rfl:     prazosin (MINIPRESS) 2 MG Cap, Take 1 capsule (2 mg total)  by mouth every evening., Disp: 90 capsule, Rfl: 3    sertraline (ZOLOFT) 100 MG tablet, Take 1 tablet (100 mg total) by mouth once daily., Disp: 90 tablet, Rfl: 3    sildenafil (VIAGRA) 100 MG tablet, Take 1 tablet (100 mg total) by mouth daily as needed for Erectile Dysfunction., Disp: 12 tablet, Rfl: 0    topiramate (TOPAMAX) 25 MG tablet, Take one tablet at night for first week, then take 1 tablet (25 mg total) by mouth 2 (two) times daily., Disp: 180 tablet, Rfl: 0    traZODone (DESYREL) 150 MG tablet, Take 1 tablet (150 mg total) by mouth every evening., Disp: 90 tablet, Rfl: 2    valACYclovir (VALTREX) 500 MG tablet, Take 1 tablet (500 mg total) by mouth 2 (two) times daily as needed., Disp: 30 tablet, Rfl: 0    colchicine (COLCRYS) 0.6 mg tablet, Take 1 tablet (0.6 mg total) by mouth once daily., Disp: 30 tablet, Rfl: 2    rizatriptan (MAXALT-MLT) 10 MG disintegrating tablet, Take 1 tablet (10 mg total) by mouth daily as needed for Migraine. May repeat in 2 hours if needed, Disp: 30 tablet, Rfl: 0  Review of patient's allergies indicates:  No Known Allergies  Review of Systems   Constitutional: Negative for chills, fever and weight loss.   Respiratory: Negative for shortness of breath.    Cardiovascular: Negative for chest pain and palpitations.       Objective:   Body mass index is 34.46 kg/m².  Vitals:    03/13/20 0809   BP: 122/87   Pulse: (!) 58           Ortho/SPM Exam -ambulatory alert well-nourished well-developed adult male in no acute distress    Respiratory effort normal    Bilateral knee exam - full knee exam will be done on next visit since most of the time today was taken up regarding consultation on his exercise forms which he needed completed and signed for the .    Overall no appreciable change from the last exam from May 2019.    Patient has mild tenderness palpation of the medial joint lines bilaterally    Mild chronic bony changes but no edema or effusion noted    Bilaterally  there is range of motion 0-120 degrees    The joints are stable with negative Lachman's but there is 1+ valgus laxity noted of the left knee joint.    Neurovascular intact    Strength 4/5 bilaterally    Psychiatric good affect cognition    30 min spent face-to-face with the patient today and over 50% time in consultation regarding care of his knees and including particular exercises that he should not do because excessive stress on the knee joint including muscles tendons ligaments and cartilage.  We discussed the benefits of viscosupplementation today and instead of cortisone injection in the near future the patient would like to try the Euflexxa which I think would be a good choice and may help improve long-term management for him to improve function and reduce pain.  IMAGING: XR ELBOW 2 VIEWS LEFT  Narrative: EXAMINATION:  TWO VIEWS OF THE BILATERAL ELBOWS    CLINICAL HISTORY:  Pain in right elbow    TECHNIQUE:  AP and lateral view of the both elbows    COMPARISON:  None.    FINDINGS:  Two views of the right elbow demonstrate no acute fracture or dislocation.  On the lateral radiograph, there appears to be soft tissue swelling in the olecranon bursal region with a few tiny calcifications.    Two views of the left elbow demonstrate no acute fracture or dislocation.  On the lateral radiograph, there is minimal enthesopathy at the triceps tendon insertion and soft tissue swelling within the olecranon bursal region with a 4 x 2.2 mm dystrophic calcification within the bursa or distal triceps tendon mineralization.  Impression: No acute bony abnormality.  As above described.    Electronically signed by: Sveta Monroe  Date:    12/26/2019  Time:    19:09  XR ELBOW 2 VIEWS RIGHT  Narrative: EXAMINATION:  TWO VIEWS OF THE BILATERAL ELBOWS    CLINICAL HISTORY:  Pain in right elbow    TECHNIQUE:  AP and lateral view of the both elbows    COMPARISON:  None.    FINDINGS:  Two views of the right elbow demonstrate no acute  fracture or dislocation.  On the lateral radiograph, there appears to be soft tissue swelling in the olecranon bursal region with a few tiny calcifications.    Two views of the left elbow demonstrate no acute fracture or dislocation.  On the lateral radiograph, there is minimal enthesopathy at the triceps tendon insertion and soft tissue swelling within the olecranon bursal region with a 4 x 2.2 mm dystrophic calcification within the bursa or distal triceps tendon mineralization.  Impression: No acute bony abnormality.  As above described.    Electronically signed by: Sveta Monroe  Date:    12/26/2019  Time:    19:09       Radiographs / Imaging : Relevant imaging results reviewed by me and interpreted by me, discussed with the patient and / or family       Assessment:     Encounter Diagnoses   Name Primary?    Arthritis of both knees Yes    Bilateral foot pain         Plan:   Arthritis of both knees    Bilateral foot pain     I reviewed the patient's updated x-rays today and find mild to moderate degenerative changes and loss of space in the medial compartments bilaterally.  I think is a good candidate for viscosupplementation.    The patient verbalized good understanding of the medical issues discussed today and expressed appreciation for the care provided.  Patient was given the opportunity to ask questions and be an active participant in their medical care. Patient had no further questions or concerns at this time.     The patient was encouraged to participate in appropriate physical activity.      Torito Damon M.D.  [facility]        This note was dictated using voice recognition software and may have sound a like errors.

## 2020-03-20 NOTE — PROGRESS NOTES
Called for one week follow up - DBP remains uncontrolled. Plan to increase amlodipine to 5 mg daily.     NA - no voicemail. Will continue to monitor.

## 2020-03-23 ENCOUNTER — DOCUMENTATION ONLY (OUTPATIENT)
Dept: REHABILITATION | Facility: HOSPITAL | Age: 45
End: 2020-03-23

## 2020-03-23 NOTE — PROGRESS NOTES
Outpatient Therapy Discharge Summary     Name: Elder Hernandez  Clinic Number: [id number]    Therapy Diagnosis:        Encounter Diagnoses   Name Primary?    Abdominal weakness      Muscle tone increased      Muscle tightness        Physician: Helena Rivera MD     Physician Orders: PT Eval and Treat  Medical Diagnosis from Referral: Lumbar facet arthropathy, back muscle spasm  Evaluation Date: 1/21/2020    Date of Last visit: 1/21/2020  Total Visits Received: 1  Cancelled Visits: 2  No Show Visits: 1    Assessment    Goals:     Patient did not return to physical therapy following initial evaluation. No goals were met.     Short Term Goals:  4 weeks   1. Pain: Pt will demonstrate improved pain by reports of less than or equal to 4/10 worst pain on the verbal rating scale in order to progress toward maximal functional ability and improve QOL.   2. Function: Patient will demonstrate improved function as indicated by a score of 52 out of 100 on the FOTO.   3. Mobility: Patient will improve AROM to 50% of stated goals, listed in objective measures above, in order to progress towards independence with functional activities.    4. Strength: Patient will improve strength to 50% of stated goals, listed in objective measures above, in order to progress towards independence with functional activities.    5. Gait: Patient will demonstrate improved gait mechanics including equal weightbearing bilaterally, equal heel strike bilaterally and increased hui in order to improve functional mobility, improve quality of life, and decrease risk of further injury or fall.    6. HEP: Patient will demonstrate independence with HEP in order to progress toward functional independence.   7. Patient will be able to hold a plank for 90 seconds without pain noting improved abdominal strength and control.       Long Term Goals:  8 weeks   1. Pain: Pt will demonstrate improved pain by reports of less than or equal to 1/10 worst  pain on the verbal rating scale in order to progress toward maximal functional ability and improve QOL.     2. Function: Patient will demonstrate improved function as indicated by a score of 59 out of 100 on the FOTO.    3. Mobility: Patient will improve AROM to stated goals, listed in objective measures above, in order to return to maximal functional potential and improve quality of life.   4. Strength: Patient will improve strength to stated goals, listed in objective measures above, in order to improve functional independence and quality of life.   5. Patient will be able to negotiate 5 flights of stairs without pain to improve pt's ability to access the 5th floor of his work building in order to return to PLOF.    6. Patient will return to normal ADL's, IADL's, community involvement, recreational activities, and work-related activities with less than or equal to 1/10 pain and maximal function.    7. Patient will perform dead lifts x30 reps with 50 lbs without pain to note adequate abdominal strength and control required to complete portion of PT test without risk of reinjury.          Discharge reason: Patient has not attended therapy since 1/21/2020.    Plan   This patient is discharged from Physical Therapy      Breana Singer, JOSELIN, DPT

## 2020-03-24 ENCOUNTER — TELEPHONE (OUTPATIENT)
Dept: PHARMACY | Facility: CLINIC | Age: 45
End: 2020-03-24

## 2020-03-24 NOTE — TELEPHONE ENCOUNTER
Good Afternoon,     The prior authorization for Elder Hernandez's generic Uloric prescription has been APPROVED FROM 3/24/2020 TO 12/31/2099 with copayment of $0.00.       Patient has been notified of the decision on 3/24/2020 and patient states he will  medication at [facility] on Wednesday 3/25/2020.    If there are any additional questions or concerns, please contact me.    Thank You!   Laura Flowers CPhT, B.A  Patient Care Advocate   [facility]  [email or web address]  Phone: [phone] Ext [phone]  Fax: [phone]

## 2020-03-25 ENCOUNTER — PATIENT OUTREACH (OUTPATIENT)
Dept: OTHER | Facility: OTHER | Age: 45
End: 2020-03-25

## 2020-03-26 ENCOUNTER — TELEPHONE (OUTPATIENT)
Dept: ORTHOPEDICS | Facility: CLINIC | Age: 45
End: 2020-03-26

## 2020-03-26 NOTE — TELEPHONE ENCOUNTER
Spoke w/ patient and informed them that due to the policy changes regarding covid-19 we have been asked system wide to cancel all non urgent appt until further notice. Informed patient that they are eligible for telemedicine visits which is a virtual visit between the patient and the doctor. Patient declined offer and stated that they will cancel appt. Informed patient that we will reach back out to them once we are able to start seeing patients face to face again. Patient verbalized understanding and was grateful for the call-DD

## 2020-03-27 ENCOUNTER — PATIENT MESSAGE (OUTPATIENT)
Dept: OTHER | Facility: OTHER | Age: 45
End: 2020-03-27

## 2020-03-30 ENCOUNTER — PATIENT OUTREACH (OUTPATIENT)
Dept: OTHER | Facility: OTHER | Age: 45
End: 2020-03-30

## 2020-03-30 DIAGNOSIS — I10 ESSENTIAL HYPERTENSION: Primary | ICD-10-CM

## 2020-03-30 RX ORDER — AMLODIPINE BESYLATE 5 MG/1
5 TABLET ORAL DAILY
Qty: 90 TABLET | Refills: 1 | Status: SHIPPED | OUTPATIENT
Start: 2020-03-30 | End: 2021-02-05 | Stop reason: SDUPTHER

## 2020-03-30 NOTE — PROGRESS NOTES
Digital Medicine: Clinician Follow-Up    Called patient to follow up. Patient endorses adherence to medication regimen. Patient denies hypotensive s/sx (lightheadedness, dizziness, nausea, fatigue); patient denies hypertensive s/sx (SOB, CP, severe headaches, changes in vision). Instructed patient to seek medical care if BP > 160/100 and is accompanied by hypertensive s/sx associated, patient confirms understanding.     Reviewed recent readings. Per 2017 ACC/ AHA HTN guidelines (goal of BP < 130/80), current 30-day average needs to be addressed more thoroughly today.     The history is provided by the patient.     Follow Up  Follow-up reason(s): reading review, medication change and routine education      Readings are trending up       INTERVENTION(S)  reviewed appropriate dose schedule, recommended med change and encouragement/support    PLAN  patient verbalizes understanding, patient amenable to changes, additional monitoring needed, Health  follow up and continue monitoring    Increase amlodipine to 5 mg daily. I will continue to monitor regularly and will follow-up in 4 to 6 weeks, sooner if needed.      Patient denies having questions or concerns. Patient has my contact information and knows to call with any concerns or clinical changes.        There are no preventive care reminders to display for this patient.    Last 5 Patient Entered Readings                                      Current 30 Day Average: 142/95     Recent Readings 3/24/2020 3/20/2020 3/18/2020 3/17/2020 3/16/2020    SBP (mmHg) 155 138 146 149 151    DBP (mmHg) 107 93 101 98 105    Pulse 59 90 97 83 59           Hypertension Medications             amLODIPine (NORVASC) 5 MG tablet Take 1 tablet (5 mg total) by mouth once daily.    losartan (COZAAR) 50 MG tablet Take 1 tablet (50 mg total) by mouth once daily.    prazosin (MINIPRESS) 2 MG Cap Take 1 capsule (2 mg total) by mouth every evening.

## 2020-04-17 ENCOUNTER — OFFICE VISIT (OUTPATIENT)
Dept: INTERNAL MEDICINE | Facility: CLINIC | Age: 45
End: 2020-04-17
Payer: OTHER GOVERNMENT

## 2020-04-17 DIAGNOSIS — K59.00 CONSTIPATION, UNSPECIFIED CONSTIPATION TYPE: ICD-10-CM

## 2020-04-17 DIAGNOSIS — R10.13 EPIGASTRIC PAIN: ICD-10-CM

## 2020-04-17 DIAGNOSIS — R10.13 EPIGASTRIC PAIN: Primary | ICD-10-CM

## 2020-04-17 PROCEDURE — 99214 PR OFFICE/OUTPT VISIT, EST, LEVL IV, 30-39 MIN: ICD-10-PCS | Mod: 95,,, | Performed by: INTERNAL MEDICINE

## 2020-04-17 PROCEDURE — 99214 OFFICE O/P EST MOD 30 MIN: CPT | Mod: 95,,, | Performed by: INTERNAL MEDICINE

## 2020-04-17 RX ORDER — FAMOTIDINE 20 MG/1
20 TABLET, FILM COATED ORAL 2 TIMES DAILY
Qty: 60 TABLET | Refills: 0 | Status: SHIPPED | OUTPATIENT
Start: 2020-04-17 | End: 2020-04-17 | Stop reason: SDUPTHER

## 2020-04-17 NOTE — TELEPHONE ENCOUNTER
----- Message from Janae Trinidad sent at 4/17/2020  2:49 PM CDT -----  ..Type:  Patient Returning Call    Who Called:pt  Who Left Message for Patient:  Does the patient know what this is regarding?:refill request   Would the patient rather a call back or a response via [facility]? Call back   Best Call Back Number:[phone]      [address] [address] [address] [place] [id number] [facility] [address]  Phone: [phone] Fax: [phone]

## 2020-04-17 NOTE — PROGRESS NOTES
Subjective:      Patient ID: Elder Hernandez is a 45 y.o. male.    Chief Complaint: Abdominal Pain    The patient location is: home    The chief complaint leading to consultation is: abd pain  Visit type: audiovisual  Total time spent with patient: 20 min  Each patient to whom he or she provides medical services by telemedicine is:  (1) informed of the relationship between the physician and patient and the respective role of any other health care provider with respect to management of the patient; and (2) notified that he or she may decline to receive medical services by telemedicine and may withdraw from such care at any time.    Notes:     46 yo with   Patient Active Problem List   Diagnosis    PTSD (post-traumatic stress disorder)    Irritable bowel syndrome with diarrhea    Chronic kidney disease    Migraine with aura and without status migrainosus, not intractable    Erectile dysfunction    HSV-2 infection    HTN (hypertension)    Benign prostatic hyperplasia without lower urinary tract symptoms    Arthritis of both knees    Gout    Bilateral foot pain    FSGS (focal segmental glomerulosclerosis)    Colon polyp    Muscle tone increased    Muscle tightness     Past Medical History:   Diagnosis Date    Acid reflux     Gout     Hypertension     Kidney problem     PTSD (post-traumatic stress disorder)     Sleep apnea      C/o   Abdominal Pain   This is a new problem. Episode onset: 5 days. The onset quality is gradual. The problem occurs intermittently (worse in evenings after dinner but before lying down. ). The pain is at a severity of 7/10. The quality of the pain is aching. The abdominal pain radiates to the LUQ and epigastric region. Associated symptoms include belching and constipation (mild. decreased BM amount. no bm in 2 days. ). Pertinent negatives include no diarrhea, fever, frequency, headaches, hematochezia, hematuria, melena, nausea or vomiting. Associated symptoms  comments: Flatulence. . Nothing aggravates the pain. The pain is relieved by nothing. Treatments tried: gas ex and tums. The treatment provided mild relief. His past medical history is significant for irritable bowel syndrome. There is no history of colon cancer, gallstones, pancreatitis or PUD.     Review of Systems   Constitutional: Negative for chills and fever.   Eyes: Negative for visual disturbance.   Respiratory: Negative for shortness of breath and wheezing.    Gastrointestinal: Positive for abdominal pain and constipation (mild. decreased BM amount. no bm in 2 days. ). Negative for diarrhea, hematochezia, melena, nausea and vomiting.   Genitourinary: Negative for frequency and hematuria.   Skin: Negative for rash and wound.   Neurological: Negative for headaches.     Objective:   There were no vitals taken for this visit.    Physical Exam   Constitutional: He is oriented to person, place, and time. He appears well-developed and well-nourished. No distress.   Eyes: EOM are normal.   Neck: Normal range of motion.   Pulmonary/Chest: Effort normal.   Abdominal: There is tenderness (per pt palpation epigastric and luq).   Musculoskeletal: He exhibits no edema.   Neurological: He is alert and oriented to person, place, and time.   Skin: Skin is warm and dry.   Psychiatric: He has a normal mood and affect. His behavior is normal.       Assessment:     1. Epigastric pain    2. Constipation, unspecified constipation type      Plan:   Epigastric pain  C/w gastritis  famotidine (PEPCID) 20 MG tablet; Take 1 tablet (20 mg total) by mouth 2 (two) times daily.  Dispense: 60 tablet; Refill: 0    Constipation, unspecified constipation type  mild  Try stool softener of miralax daily until bowel movements normalize    ER precautions discussed    Lab Frequency Next Occurrence   Urinalysis Once 08/26/2019   X-Ray Foot Complete Left Once 12/19/2019   Ambulatory referral/consult to Genetics Once 02/18/2020   Renal function panel  Once 02/11/2020   CK Once 02/11/2020   Urinalysis Once 02/11/2020   Uric acid Once 03/13/2020   Comprehensive metabolic panel Once 03/13/2020   CK     Angiotensin converting enzyme     C-reactive protein     Sedimentation rate     Comprehensive metabolic panel     Comprehensive metabolic panel         Problem List Items Addressed This Visit     None      Visit Diagnoses     Epigastric pain    -  Primary    Constipation, unspecified constipation type              Follow up if symptoms worsen or fail to improve.

## 2020-04-17 NOTE — PATIENT INSTRUCTIONS
Try stool softener of miralax daily until bowel movements normalize      Tips to Control Acid Reflux    To control acid reflux, youll need to make some basic diet and lifestyle changes. The simple steps outlined below may be all youll need to ease discomfort.  Watch what you eat  · Avoid fatty foods and spicy foods.  · Eat fewer acidic foods, such as citrus and tomato-based foods. These can increase symptoms.  · Limit drinking alcohol, caffeine, and fizzy beverages. All increase acid reflux.  · Try limiting chocolate, peppermint, and spearmint. These can worsen acid reflux in some people.  Watch when you eat  · Avoid lying down for 3 hours after eating.  · Do not snack before going to bed.  Raise your head  Raising your head and upper body by 4 to 6 inches helps limit reflux when youre lying down. Put blocks under the head of your bed frame to raise it.  Other changes  · Lose weight, if you need to  · Dont exercise near bedtime  · Avoid tight-fitting clothes  · Limit aspirin and ibuprofen  · Stop smoking   Date Last Reviewed: 7/1/2016 © 2000-2017 [employer]. [address] [place] [address]. All rights reserved. This information is not intended as a substitute for professional medical care. Always follow your healthcare professional's instructions.        GERD (Adult)    The esophagus is a tube that carries food from the mouth to the stomach. A valve at the lower end of the esophagus prevents stomach acid from flowing upward. When this valve doesn't work properly, stomach contents may repeatedly flow back up (reflux) into the esophagus. This is called gastroesophageal reflux disease (GERD). GERD can irritate the esophagus. It can cause problems with swallowing or breathing. In severe cases, GERD can cause recurrent pneumonia or other serious problems.  Symptoms of reflux include burning, pressure or sharp pain in the upper abdomen or mid to lower chest. The pain can spread to the neck,  "back, or shoulder. There may be belching, an acid taste in the back of the throat, chronic cough, or sore throat or hoarseness. GERD symptoms often occur during the day after a big meal. They can also occur at night when lying down.   Home care  Lifestyle changes can help reduce symptoms. If needed, medicines may be prescribed. Symptoms often improve with treatment, but if treatment is stopped, the symptoms often return after a few months. So most persons with GERD will need to continue treatment.  Lifestyle changes  · Limit or avoid fatty, fried, and spicy foods, as well as coffee, chocolate, mint, and foods with high acid content such as tomatoes and citrus fruit and juices (orange, grapefruit, lemon).  · Dont eat large meals, especially at night. Frequent, smaller meals are best. Do not lie down right after eating. And dont eat anything 3 hours before going to bed.  · Avoid drinking alcohol and smoking. As much as possible, stay away from second hand smoke.  · If you are overweight, losing weight will reduce symptoms.   · Avoid wearing tight clothing around your stomach area.  · If your symptoms occur during sleep, use a foam wedge to elevate your upper body (not just your head.) Or, place 4" blocks under the head of your bed.  Medicines  If needed, medicines can help relieve the symptoms of GERD and prevent damage to the esophagus. Discuss a medicine plan with your healthcare provider. This may include one or more of the following medicines:  · Antacids to help neutralize the normal acids in your stomach.  · Acid blockers (H2 blockers) to decrease acid production.  · Acid inhibitors (PPIs) to decrease acid production in a different way than the blockers. They may work better, but can take a little longer to take effect.  Take an antacid 30-60 minutes after eating and at bedtime, but not at the same time as an acid blocker.  Try not to take medicines such as ibuprofen and aspirin. If you are taking aspirin for " your heart or other medical reasons, talk to your healthcare provider about stopping it.  Follow-up care  Follow up with your healthcare provider or as advised by our staff.  When to seek medical advice  Call your healthcare provider if any of the following occur:  · Stomach pain gets worse or moves to the lower right abdomen (appendix area)  · Chest pain appears or gets worse, or spreads to the back, neck, shoulder, or arm  · Frequent vomiting (cant keep down liquids)  · Blood in the stool or vomit (red or black in color)  · Feeling weak or dizzy  · Fever of 100.4ºF (38ºC) or higher, or as directed by your healthcare provider  Date Last Reviewed: 6/23/2015  © 7190-9195 [employer]. [address] [place] [address], [place] [address]. All rights reserved. This information is not intended as a substitute for professional medical care. Always follow your healthcare professional's instructions.

## 2020-04-18 RX ORDER — FAMOTIDINE 20 MG/1
20 TABLET, FILM COATED ORAL 2 TIMES DAILY
Qty: 60 TABLET | Refills: 0 | Status: SHIPPED | OUTPATIENT
Start: 2020-04-18 | End: 2020-08-10 | Stop reason: SDUPTHER

## 2020-04-27 ENCOUNTER — PATIENT OUTREACH (OUTPATIENT)
Dept: OTHER | Facility: OTHER | Age: 45
End: 2020-04-27

## 2020-04-27 NOTE — PROGRESS NOTES
Digital Medicine: Clinician Follow-Up    Called patient to follow up on recent dose increase - amlodipine increased from 2.5 mg daily to 5 mg daily. No downward trend appreciated. Also no readings in 2 weeks and patient states he started the dose increase about a week after I prescribed it; therefore, have about 1 week of amlodipine dose increase documented through readings.     The history is provided by the patient.     Follow Up  Follow-up reason(s): reading review, medication change follow-up and routine education        Patient endorses adherence to medication regimen. Patient denies hypotensive s/sx (lightheadedness, dizziness, nausea, fatigue); patient denies hypertensive s/sx (SOB, CP, severe headaches, changes in vision). Instructed patient to seek medical care if BP > 160/100 and is accompanied by hypertensive s/sx associated, patient confirms understanding.     Reviewed recent readings. Per 2017 ACC/ AHA HTN guidelines (goal of BP < 130/80), current 30-day average is above goal - requested more frequent readings to appropriately monitor medication adjustment.              INTERVENTION(S)  reviewed appropriate dose schedule and encouragement/support    PLAN  patient verbalizes understanding, additional monitoring needed, Health  follow up and continue monitoring    Continue current medication regimen. I will continue to monitor regularly and will follow-up in 1 to 2 months.     Patient denies having questions or concerns. Patient has my contact information and knows to call with any concerns or clinical changes.      There are no preventive care reminders to display for this patient.    Last 5 Patient Entered Readings                                      Current 30 Day Average: 143/93     Recent Readings 4/15/2020 4/14/2020 4/10/2020 4/9/2020 4/6/2020    SBP (mmHg) 147 142 143 143 151    DBP (mmHg) 84 97 98 86 101    Pulse 92 70 61 102 63           Hypertension Medications             amLODIPine  (NORVASC) 5 MG tablet Take 1 tablet (5 mg total) by mouth once daily.    losartan (COZAAR) 50 MG tablet Take 1 tablet (50 mg total) by mouth once daily.    prazosin (MINIPRESS) 2 MG Cap Take 1 capsule (2 mg total) by mouth every evening.                   Sleep Apnea Screening  Patient not previously diagnosed with LALITO and

## 2020-05-11 NOTE — TELEPHONE ENCOUNTER
----- Message from Isamar Townsend sent at 5/11/2020  4:13 PM CDT -----  Contact: Pt  Type:  RX Refill Request    Who Called: PT  Refill or New Rx:REFILL  RX Name and Strength:  Febuxostat  How is the patient currently taking it? (ex. 1XDay):1  Is this a 30 day or 90 day RX:30  Preferred Pharmacy with phone number:  [facility]  [address]  [place] [address]  Phone: [phone] Fax: [phone]  Local or Mail Order:LOCAL  Ordering Provider:ARACELIS  Would the patient rather a call back or a response via [facility]? CALL BACK  Best Call Back Number:[phone]  Additional Information:

## 2020-05-12 RX ORDER — FEBUXOSTAT 40 MG/1
40 TABLET, FILM COATED ORAL DAILY
Qty: 90 TABLET | Refills: 1 | Status: SHIPPED | OUTPATIENT
Start: 2020-05-12 | End: 2020-11-17 | Stop reason: SDUPTHER

## 2020-05-13 NOTE — PROGRESS NOTES
"Digital Medicine: Health  Follow-Up    The history is provided by the patient.     Follow Up  Follow-up reason(s): reading review    Patient stated he had "5 minutes to talk"    Patient's SBP is looking better, recent SBP are in the 130's/140's. He's pleased with the readings.    DBP remains elevated. Patient states he's been eating a lot of crawfish every weekend with alcoholic beverages.     Discussed with patient crawfish is high in sodium and that paired with the alcohol is going to increase the fluid retention in his body.  Encouraged patient to drink water instead and increase his water intake, especially on the weekends when he tends to eat crawfish.         INTERVENTION(S)  recommended diet modifications, encouragement/support and denied questions    PLAN  patient verbalizes understanding, patient amenable to changes and continue monitoring    Will follow up in 4 weeks.       There are no preventive care reminders to display for this patient.    Last 5 Patient Entered Readings                                      Current 30 Day Average: 139/91     Recent Readings 5/13/2020 5/11/2020 5/8/2020 5/7/2020 5/6/2020    SBP (mmHg) 148 140 132 137 137    DBP (mmHg) 100 98 89 89 84    Pulse 69 88 103 84 81                      Diet Screening       Intervention(s): reducing sodium intake and increasing water intake      SDOH  "

## 2020-05-14 ENCOUNTER — TELEPHONE (OUTPATIENT)
Dept: INTERNAL MEDICINE | Facility: CLINIC | Age: 45
End: 2020-05-14

## 2020-05-14 ENCOUNTER — OFFICE VISIT (OUTPATIENT)
Dept: OPHTHALMOLOGY | Facility: CLINIC | Age: 45
End: 2020-05-14
Payer: OTHER GOVERNMENT

## 2020-05-14 ENCOUNTER — PATIENT OUTREACH (OUTPATIENT)
Dept: ADMINISTRATIVE | Facility: OTHER | Age: 45
End: 2020-05-14

## 2020-05-14 ENCOUNTER — TELEPHONE (OUTPATIENT)
Dept: RHEUMATOLOGY | Facility: CLINIC | Age: 45
End: 2020-05-14

## 2020-05-14 DIAGNOSIS — H52.13 MYOPIA WITH ASTIGMATISM AND PRESBYOPIA, BILATERAL: Primary | ICD-10-CM

## 2020-05-14 DIAGNOSIS — H52.4 MYOPIA WITH ASTIGMATISM AND PRESBYOPIA, BILATERAL: Primary | ICD-10-CM

## 2020-05-14 DIAGNOSIS — Z00.00 ROUTINE GENERAL MEDICAL EXAMINATION AT A HEALTH CARE FACILITY: Primary | ICD-10-CM

## 2020-05-14 DIAGNOSIS — H52.203 MYOPIA WITH ASTIGMATISM AND PRESBYOPIA, BILATERAL: Primary | ICD-10-CM

## 2020-05-14 PROCEDURE — 92004 PR EYE EXAM, NEW PATIENT,COMPREHESV: ICD-10-PCS | Mod: S$PBB,,, | Performed by: OPTOMETRIST

## 2020-05-14 PROCEDURE — 99999 PR PBB SHADOW E&M-EST. PATIENT-LVL I: CPT | Mod: PBBFAC,,, | Performed by: OPTOMETRIST

## 2020-05-14 PROCEDURE — 92015 PR REFRACTION: ICD-10-PCS | Mod: ,,, | Performed by: OPTOMETRIST

## 2020-05-14 PROCEDURE — 92004 COMPRE OPH EXAM NEW PT 1/>: CPT | Mod: S$PBB,,, | Performed by: OPTOMETRIST

## 2020-05-14 PROCEDURE — 99211 OFF/OP EST MAY X REQ PHY/QHP: CPT | Mod: PBBFAC | Performed by: OPTOMETRIST

## 2020-05-14 PROCEDURE — 92310 CONTACT LENS FITTING OU: CPT | Mod: CSM,,, | Performed by: OPTOMETRIST

## 2020-05-14 PROCEDURE — 92310 PR CONTACT LENS FITTING (NO CHANGE): ICD-10-PCS | Mod: CSM,,, | Performed by: OPTOMETRIST

## 2020-05-14 PROCEDURE — 92015 DETERMINE REFRACTIVE STATE: CPT | Mod: ,,, | Performed by: OPTOMETRIST

## 2020-05-14 PROCEDURE — 99999 PR PBB SHADOW E&M-EST. PATIENT-LVL I: ICD-10-PCS | Mod: PBBFAC,,, | Performed by: OPTOMETRIST

## 2020-05-14 NOTE — PROGRESS NOTES
HPI     Pts last exam was 1 year ago. PT c/o blurred distance va and wears cls   full time.   HPI    Any vision changes since last exam: no   Eye pain: no  Other ocular symptoms: no    Do you wear currently wear glasses or contacts? both    Interested in contacts today? Yes, wears AV oasys    Do you plan on getting new glasses today? If needed          Last edited by Hayde Mg MA on 5/14/2020  9:24 AM. (History)            Assessment /Plan     For exam results, see Encounter Report.    Myopia with astigmatism and presbyopia, bilateral      Eyeglass Final Rx     Eyeglass Final Rx       Sphere Cylinder Axis    Right -3.00 +1.25 010    Left -2.25 +0.75 170    Expiration Date:  5/15/2021              Contact Lens Prescription (5/14/2020)        Brand Base Curve Diameter Sphere Cylinder Axis    Right Acuvue Oasys for Astigmatism 8.60 14.5 -1.50 -1.25 100    Left Acuvue Oasys for Astigmatism 8.60 14.5 -1.50 -0.75 080    Expiration Date:  5/15/2021    Replacement:  Every 2 weeks    Wearing Schedule:  Daily wear        RTC 1 yr for dilated eye exam or PRN if any problems.   Discussed above and answered questions.

## 2020-05-15 ENCOUNTER — OFFICE VISIT (OUTPATIENT)
Dept: RHEUMATOLOGY | Facility: CLINIC | Age: 45
End: 2020-05-15
Payer: OTHER GOVERNMENT

## 2020-05-15 VITALS
SYSTOLIC BLOOD PRESSURE: 132 MMHG | HEIGHT: 67 IN | WEIGHT: 209.19 LBS | BODY MASS INDEX: 32.83 KG/M2 | DIASTOLIC BLOOD PRESSURE: 85 MMHG | HEART RATE: 84 BPM

## 2020-05-15 DIAGNOSIS — M77.01 EPICONDYLITIS ELBOW, MEDIAL, RIGHT: Primary | ICD-10-CM

## 2020-05-15 DIAGNOSIS — M70.22 OLECRANON BURSITIS OF BOTH ELBOWS: ICD-10-CM

## 2020-05-15 DIAGNOSIS — R74.8 HYPERCKEMIA: ICD-10-CM

## 2020-05-15 DIAGNOSIS — Z71.89 COUNSELING ON HEALTH PROMOTION AND DISEASE PREVENTION: ICD-10-CM

## 2020-05-15 DIAGNOSIS — M70.21 OLECRANON BURSITIS OF BOTH ELBOWS: ICD-10-CM

## 2020-05-15 DIAGNOSIS — M10.9 GOUT, UNSPECIFIED CAUSE, UNSPECIFIED CHRONICITY, UNSPECIFIED SITE: ICD-10-CM

## 2020-05-15 PROCEDURE — 99214 OFFICE O/P EST MOD 30 MIN: CPT | Mod: S$PBB,,, | Performed by: INTERNAL MEDICINE

## 2020-05-15 PROCEDURE — 99214 PR OFFICE/OUTPT VISIT, EST, LEVL IV, 30-39 MIN: ICD-10-PCS | Mod: S$PBB,,, | Performed by: INTERNAL MEDICINE

## 2020-05-15 PROCEDURE — 99213 OFFICE O/P EST LOW 20 MIN: CPT | Mod: PBBFAC | Performed by: INTERNAL MEDICINE

## 2020-05-15 PROCEDURE — 99999 PR PBB SHADOW E&M-EST. PATIENT-LVL III: ICD-10-PCS | Mod: PBBFAC,,, | Performed by: INTERNAL MEDICINE

## 2020-05-15 PROCEDURE — 99999 PR PBB SHADOW E&M-EST. PATIENT-LVL III: CPT | Mod: PBBFAC,,, | Performed by: INTERNAL MEDICINE

## 2020-05-15 RX ORDER — DICLOFENAC SODIUM 10 MG/G
2 GEL TOPICAL 4 TIMES DAILY
Qty: 1 TUBE | Refills: 2 | Status: SHIPPED | OUTPATIENT
Start: 2020-05-15 | End: 2020-06-14

## 2020-05-15 RX ORDER — DICLOFENAC SODIUM 10 MG/G
2 GEL TOPICAL DAILY
Qty: 1 TUBE | Refills: 2 | Status: SHIPPED | OUTPATIENT
Start: 2020-05-15 | End: 2021-06-14 | Stop reason: SDUPTHER

## 2020-05-15 NOTE — PATIENT INSTRUCTIONS
Understanding Medial Epicondylitis    Several muscles attach to the arm at the elbow joint. The tough bands of tissue that attach muscle to bones are called tendons. The bone in the upper arm has knobs on the farthest end called epicondyles. Tendons attach some arm muscles to these knobs. The tissues in this area can become irritated.  Epicondylitis is the medical term for a painful elbow over the epicondyle. Medial refers to the inner side of the elbow. Medial epicondylitis is sometimes called golfers elbow.     How to say it  JENNY[facility]elvie[facility]FXPP-dye-lie-tis   Causes of medial epicondylitis  A painful inner elbow may be caused by:  · Using an elbow or hand the same way over and over  · Using poor form or too much force in a sport such as golf, tennis, or baseball  · Lifting too heavy a weight  · Other injuries to the arm or elbow  Symptoms of medial epicondylitis  · Pain or tenderness on the inside of the elbow that may travel down the forearm  · Pain when moving the wrist  · Pain or weakness when gripping something  · A crackling sound or grating feeling when moving the elbow  Treatment for medial epicondylitis  Treatments may include:  · Avoiding or changing the action that caused the problem. This helps prevent irritating the tissues more.  · Prescription or over-the-counter pain medicines. These help reduce inflammation, swelling, and pain.  · Cold or heat packs. These help reduce pain and swelling.  · Stretching and other exercises. These improve flexibility and strength.  · Physical therapy. This may include exercises or other treatments.  · Injections of medicine. This may relieve symptoms.  If other treatments do not relieve symptoms, you may need surgery.  Possible complications  If you dont give your elbow time to heal, symptoms may return or get worse. Follow your healthcare providers instructions on resting and treating your elbow.     When to call your healthcare provider  Call your  healthcare provider right away if you have any of these:  · Fever of 100.4°F (38°C) or higher, or as directed  · Redness, swelling, or warmth that gets worse  · Symptoms that dont get better with prescribed medicines, or get worse  · New symptoms   Date Last Reviewed: 3/10/2016  © 0433-1385 [employer]. [address] [place] [address], [place] [address]. All rights reserved. This information is not intended as a substitute for professional medical care. Always follow your healthcare professional's instructions.

## 2020-05-15 NOTE — PROGRESS NOTES
RHEUMATOLOGY OUTPATIENT CLINIC NOTE    5/15/2020    Attending Rheumatologist: Mannie Holland  Primary Care Provider: Tommei Baltazar MD   Physician Requesting Consultation: No referring provider defined for this encounter.  Chief Complaint/Reason For Consultation:  Appointment    Subjective:       GIANA  Elder Hernandez is a 45 y.o. Black or  male hyper CK Gloria and gout comes for follow-up.    Today  Last seen on March.  Allopurinol switch to Uloric 40 mg daily.  Patient referred taking until approximately 4 days ago when ran out of script.  Denies gout flare since last visit.  Main complaint today right elbow pain.  Worst in the evening, aggravated by range of motion. Relieved somewhat by rest and OTC pain medication.  Denies association with prolonged morning stiffness, redness, or joint swelling.    Review of Systems   Constitutional: Negative for chills, fever and malaise/fatigue.   Eyes: Negative for pain and redness.   Respiratory: Negative for cough, hemoptysis and shortness of breath.    Cardiovascular: Negative for chest pain and leg swelling.   Gastrointestinal: Negative for abdominal pain, blood in stool and melena.   Genitourinary: Negative for dysuria and hematuria.   Musculoskeletal: Positive for joint pain (Right elbow, mechanical pattern.). Negative for falls.   Skin: Negative for rash.   Neurological: Negative for tingling and focal weakness.   Psychiatric/Behavioral: Negative for memory loss. The patient does not have insomnia.      Chronic comorbid conditions affecting medical decision making today:  Past Medical History:   Diagnosis Date    Acid reflux     Gout     Hypertension     Kidney problem     PTSD (post-traumatic stress disorder)     Sleep apnea    · FSGS  · History of rhabdomyolysis    Past Surgical History:   Procedure Laterality Date    COLONOSCOPY N/A 10/8/2019    Procedure: COLONOSCOPY;  Surgeon: Niraj Grey MD;  Location: [facility];  Service:  Endoscopy;  Laterality: N/A;    RENAL BIOPSY Right 2015    SHOULDER SURGERY Left 2015     Family History   Problem Relation Age of Onset    No Known Problems Mother     No Known Problems Father      Social History     Substance and Sexual Activity   Alcohol Use Yes    Comment: socially     Social History     Tobacco Use   Smoking Status Never Smoker   Smokeless Tobacco Never Used     Social History     Substance and Sexual Activity   Drug Use Never       Current Outpatient Medications:     amLODIPine (NORVASC) 5 MG tablet, Take 1 tablet (5 mg total) by mouth once daily., Disp: 90 tablet, Rfl: 1    diclofenac sodium (VOLTAREN) 1 % Gel, Apply 2 grams topically once daily., Disp: 1 Tube, Rfl: 2    dicyclomine (BENTYL) 20 mg tablet, Take 1 tablet (20 mg total) by mouth 3 (three) times daily as needed (abdominal pain)., Disp: 90 tablet, Rfl: 0    famotidine (PEPCID) 20 MG tablet, Take 1 tablet (20 mg total) by mouth 2 (two) times daily., Disp: 60 tablet, Rfl: 0    febuxostat (ULORIC) 40 mg Tab, Take 1 tablet (40 mg total) by mouth once daily., Disp: 90 tablet, Rfl: 1    finasteride (PROSCAR) 5 mg tablet, Take 1 tablet (5 mg total) by mouth once daily., Disp: 90 tablet, Rfl: 0    losartan (COZAAR) 50 MG tablet, Take 1 tablet (50 mg total) by mouth once daily., Disp: 90 tablet, Rfl: 3    methocarbamol (ROBAXIN) 750 MG Tab, Take one or two tablets by mouth three times daily as needed for muscle spasm., Disp: 30 tablet, Rfl: 0    MULTIVITAMIN ORAL, Take 1 tablet by mouth once daily., Disp: , Rfl:     sertraline (ZOLOFT) 100 MG tablet, Take 1 tablet (100 mg total) by mouth once daily., Disp: 90 tablet, Rfl: 3    sildenafil (VIAGRA) 100 MG tablet, Take 1 tablet (100 mg total) by mouth daily as needed for Erectile Dysfunction., Disp: 12 tablet, Rfl: 0    topiramate (TOPAMAX) 25 MG tablet, Take one tablet at night for first week, then take 1 tablet (25 mg total) by mouth 2 (two) times daily., Disp: 180 tablet,  "Rfl: 0    traZODone (DESYREL) 150 MG tablet, Take 1 tablet (150 mg total) by mouth every evening., Disp: 90 tablet, Rfl: 2    valACYclovir (VALTREX) 500 MG tablet, Take 1 tablet (500 mg total) by mouth 2 (two) times daily as needed., Disp: 30 tablet, Rfl: 0    colchicine (COLCRYS) 0.6 mg tablet, Take 1 tablet (0.6 mg total) by mouth once daily., Disp: 30 tablet, Rfl: 2    diclofenac sodium (VOLTAREN) 1 % Gel, Apply 2 grams topically 4 (four) times daily., Disp: 1 Tube, Rfl: 2    prazosin (MINIPRESS) 2 MG Cap, Take 1 capsule (2 mg total) by mouth every evening., Disp: 90 capsule, Rfl: 3    rizatriptan (MAXALT-MLT) 10 MG disintegrating tablet, Take 1 tablet (10 mg total) by mouth daily as needed for Migraine. May repeat in 2 hours if needed, Disp: 30 tablet, Rfl: 0     Objective:         Vitals:    05/15/20 1142   BP: 132/85   Pulse: 84     Physical Exam   Constitutional: No distress.   Estimated body mass index is 32.77 kg/m² as calculated from the following:    Height as of this encounter: 5' 7" (1.702 m).    Weight as of this encounter: 94.9 kg (209 lb 3.5 oz).    Wt Readings from Last 1 Encounters:  05/15/20 1142 : 94.9 kg (209 lb 3.5 oz)     HENT:   Head: Normocephalic and atraumatic.   Eyes: Conjunctivae are normal. Pupils are equal, round, and reactive to light.   Neck: Normal range of motion.   Cardiovascular: Normal rate and intact distal pulses.    Pulmonary/Chest: Effort normal. No respiratory distress.   Abdominal: Soft. He exhibits no distension.   Neurological: He is alert. Gait normal.   Skin: No rash noted. No erythema.     Musculoskeletal: Normal range of motion. He exhibits tenderness (Medial epicondyle.  Exacerbated during hand flexion.).   : strong  No synovitis or significant squeeze tenderness    AROM: intact  PROM: intact    Devices used by patient: none       Reviewed old and all outside pertinent medical records available.    All lab results personally reviewed and interpreted by " me.  Lab Results   Component Value Date    WBC 10.23 05/06/2019    HGB 14.2 05/06/2019    HCT 39.6 (L) 05/06/2019    MCV 88 05/06/2019    MCH 31.7 (H) 05/06/2019    MCHC 35.9 05/06/2019    RDW 13.8 05/06/2019     05/06/2019    MPV 11.0 05/06/2019       Lab Results   Component Value Date     03/13/2020    K 4.2 03/13/2020     03/13/2020    CO2 26 03/13/2020    GLU 82 03/13/2020    BUN 16 03/13/2020    CALCIUM 9.3 03/13/2020    PROT 7.8 03/13/2020    ALBUMIN 3.7 03/13/2020    BILITOT 0.8 03/13/2020    AST 29 03/13/2020    ALKPHOS 73 03/13/2020    ALT 28 03/13/2020       Lab Results   Component Value Date    COLORU Yellow 02/04/2020    APPEARANCEUA Clear 02/04/2020    SPECGRAV 1.020 02/04/2020    PHUR 6.0 02/04/2020    PROTEINUA 2+ (A) 02/04/2020    KETONESU Negative 02/04/2020    LEUKOCYTESUR Negative 02/04/2020    NITRITE Negative 02/04/2020       Lab Results   Component Value Date    CRP 3.4 10/18/2019       Lab Results   Component Value Date    SEDRATE <2 10/18/2019       Lab Results   Component Value Date    SEDRATE <2 10/18/2019       No components found for: 25OHVITDTOT, 34CSXWSF1, 02YUWCDI9, METHODNOTE    Lab Results   Component Value Date    URICACID 7.8 (H) 03/13/2020     No components found for: TSPOTTB    · CK: 830 (5/2019) -> 543 (8/2019)-> 707 (10/2019)-> 402  · Aldolase within reference value    Rheum Labs:   MARINA negative   RNP antibody negative   Myomarker panel 3 negative     Infectious Labs:   HIV NR     Imaging:  All imaging reviewed and independently  interpreted by me.    X-ray knees March 2020   Mild degenerative change throughout the remainder of the knee.     ASSESSMENT / PLAN:     Elder Hernandez is a 45 y.o. Black or  male with:    1. Chronic gout  - was previously recommended not to increase allopurinol past 150 mg by Nephrology.  - recommended switch to Uloric 40 mg last visit.  Ran out of script approximately 4 days ago.   - denies gout flare  since last visit.  Was tolerating therapy without side effects.    - Will resume Uloric 40mg daily.  Adherence with therapy reinforced.  - will remeasure uric acid if refractory gout.  - may stop colchicine (patient reports not taking)  - continue dietary and lifestyle modifications    2. Right medial epicondylitis  - history of present illness and current findings consistent with DJD.  - Discussed and recommended exercise (amy non wt-bearing/swimming)  - resting affected joint for brief periods (<12 h), activity modification  - brace p.r.n. recommended  - stretching, massage, heat/ice  - Acetaminophen prn up to 3 g per day  - Topicals therapy: Capsaicin / NSAIDs     2. HyperCKemia  - no features suggestive of active inflammatory myopathy    3. Other specified counseling  - over 10 minutes spent regarding below topics:  - Nutrition and exercise counseling.  - Medication counseling provided.    Follow up in about 5 months (around 10/15/2020).    Method of contact with patient concerns: [facility]    Disclaimer:  This note is prepared using voice recognition software and as such is likely to have errors and has not been proof read. Please contact me for questions.     Mannie Holland M.D.  Rheumatology Department   [facility] [place]

## 2020-06-11 ENCOUNTER — PATIENT OUTREACH (OUTPATIENT)
Dept: ADMINISTRATIVE | Facility: OTHER | Age: 45
End: 2020-06-11

## 2020-06-12 ENCOUNTER — OFFICE VISIT (OUTPATIENT)
Dept: RHEUMATOLOGY | Facility: CLINIC | Age: 45
End: 2020-06-12
Payer: OTHER GOVERNMENT

## 2020-06-12 VITALS
BODY MASS INDEX: 32.87 KG/M2 | WEIGHT: 209.88 LBS | DIASTOLIC BLOOD PRESSURE: 85 MMHG | SYSTOLIC BLOOD PRESSURE: 121 MMHG | HEART RATE: 85 BPM

## 2020-06-12 DIAGNOSIS — M10.9 GOUT, UNSPECIFIED CAUSE, UNSPECIFIED CHRONICITY, UNSPECIFIED SITE: Primary | ICD-10-CM

## 2020-06-12 DIAGNOSIS — M77.01 EPICONDYLITIS ELBOW, MEDIAL, RIGHT: ICD-10-CM

## 2020-06-12 PROCEDURE — 99499 UNLISTED E&M SERVICE: CPT | Mod: S$PBB,,, | Performed by: INTERNAL MEDICINE

## 2020-06-12 PROCEDURE — 99999 PR PBB SHADOW E&M-EST. PATIENT-LVL III: ICD-10-PCS | Mod: PBBFAC,,, | Performed by: INTERNAL MEDICINE

## 2020-06-12 PROCEDURE — 99999 PR PBB SHADOW E&M-EST. PATIENT-LVL III: CPT | Mod: PBBFAC,,, | Performed by: INTERNAL MEDICINE

## 2020-06-12 PROCEDURE — 99213 OFFICE O/P EST LOW 20 MIN: CPT | Mod: PBBFAC | Performed by: INTERNAL MEDICINE

## 2020-06-12 PROCEDURE — 99499 NO LOS: ICD-10-PCS | Mod: S$PBB,,, | Performed by: INTERNAL MEDICINE

## 2020-06-23 ENCOUNTER — PATIENT OUTREACH (OUTPATIENT)
Dept: OTHER | Facility: OTHER | Age: 45
End: 2020-06-23

## 2020-06-25 ENCOUNTER — OFFICE VISIT (OUTPATIENT)
Dept: INTERNAL MEDICINE | Facility: CLINIC | Age: 45
End: 2020-06-25
Payer: OTHER GOVERNMENT

## 2020-06-25 VITALS
RESPIRATION RATE: 16 BRPM | HEART RATE: 64 BPM | HEIGHT: 67 IN | TEMPERATURE: 97 F | DIASTOLIC BLOOD PRESSURE: 82 MMHG | OXYGEN SATURATION: 98 % | WEIGHT: 212.5 LBS | SYSTOLIC BLOOD PRESSURE: 128 MMHG | BODY MASS INDEX: 33.35 KG/M2

## 2020-06-25 DIAGNOSIS — J06.9 UPPER RESPIRATORY TRACT INFECTION, UNSPECIFIED TYPE: Primary | ICD-10-CM

## 2020-06-25 PROCEDURE — 99215 OFFICE O/P EST HI 40 MIN: CPT | Mod: PBBFAC | Performed by: INTERNAL MEDICINE

## 2020-06-25 PROCEDURE — 99999 PR PBB SHADOW E&M-EST. PATIENT-LVL V: CPT | Mod: PBBFAC,,, | Performed by: INTERNAL MEDICINE

## 2020-06-25 PROCEDURE — 99999 PR PBB SHADOW E&M-EST. PATIENT-LVL V: ICD-10-PCS | Mod: PBBFAC,,, | Performed by: INTERNAL MEDICINE

## 2020-06-25 PROCEDURE — 99213 PR OFFICE/OUTPT VISIT, EST, LEVL III, 20-29 MIN: ICD-10-PCS | Mod: S$PBB,,, | Performed by: INTERNAL MEDICINE

## 2020-06-25 PROCEDURE — 99213 OFFICE O/P EST LOW 20 MIN: CPT | Mod: S$PBB,,, | Performed by: INTERNAL MEDICINE

## 2020-06-25 RX ORDER — CETIRIZINE HYDROCHLORIDE 10 MG/1
10 TABLET ORAL NIGHTLY
Qty: 30 TABLET | Refills: 0 | Status: SHIPPED | OUTPATIENT
Start: 2020-06-25 | End: 2021-06-14 | Stop reason: SDUPTHER

## 2020-06-25 RX ORDER — FLUTICASONE PROPIONATE 50 MCG
2 SPRAY, SUSPENSION (ML) NASAL DAILY
Qty: 16 G | Refills: 2 | Status: SHIPPED | OUTPATIENT
Start: 2020-06-25 | End: 2020-07-25

## 2020-06-25 NOTE — PROGRESS NOTES
"Subjective:      Patient ID: Elder Hernandez is a 45 y.o. male.    Chief Complaint: Nasal Congestion    HPI   46 yo with   Patient Active Problem List   Diagnosis    PTSD (post-traumatic stress disorder)    Irritable bowel syndrome with diarrhea    Chronic kidney disease    Migraine with aura and without status migrainosus, not intractable    Erectile dysfunction    HSV-2 infection    HTN (hypertension)    Benign prostatic hyperplasia without lower urinary tract symptoms    Arthritis of both knees    Gout    Bilateral foot pain    FSGS (focal segmental glomerulosclerosis)    Colon polyp    Muscle tone increased    Muscle tightness     Past Medical History:   Diagnosis Date    Acid reflux     Gout     Hypertension     Kidney problem     PTSD (post-traumatic stress disorder)     Sleep apnea      Here today c/o nasal congestion.  Congestion/blockages right nostril only.  No discharge.  He does feel that he may have some postnasal drip on the right side as he feels he needs to clear his throat often.  He has had some mild increase in sneezing.  Right side of his throat as intermittent itchy feeling.  No significant pain.  No sinus pressure.  No nose bleeds.  Review of Systems   Constitutional: Negative for chills and fever.   HENT: Negative for drooling, ear discharge, ear pain, mouth sores, nosebleeds, rhinorrhea, sinus pressure, sinus pain, sneezing, sore throat, trouble swallowing and voice change.    Respiratory: Negative for cough.    Cardiovascular: Negative for chest pain.   Gastrointestinal: Negative for abdominal pain and blood in stool.   Genitourinary: Negative for dysuria and hematuria.   Neurological: Negative for seizures and syncope.     Objective:   /82 (BP Location: Right arm, Patient Position: Sitting, BP Method: Large (Manual))   Pulse 64   Temp 96.8 °F (36 °C) (Tympanic)   Resp 16   Ht 5' 7" (1.702 m)   Wt 96.4 kg (212 lb 8.4 oz)   SpO2 98%   BMI 33.29 " kg/m²     Physical Exam  Constitutional:       General: He is not in acute distress.     Appearance: He is well-developed.   HENT:      Right Ear: Tympanic membrane normal.      Left Ear: Tympanic membrane normal.      Nose:      Right Nostril: No foreign body, epistaxis, septal hematoma or occlusion.      Left Nostril: No foreign body, epistaxis, septal hematoma or occlusion.      Right Sinus: No maxillary sinus tenderness or frontal sinus tenderness.      Left Sinus: No maxillary sinus tenderness or frontal sinus tenderness.   Cardiovascular:      Rate and Rhythm: Normal rate.   Pulmonary:      Effort: Pulmonary effort is normal.      Breath sounds: Normal breath sounds.   Skin:     General: Skin is warm and dry.   Psychiatric:         Behavior: Behavior normal.         Assessment:     1. Upper respiratory tract infection, unspecified type      Plan:   Upper respiratory tract infection, unspecified type  -     fluticasone propionate (FLONASE) 50 mcg/actuation nasal spray; use 2 sprays (100 mcg total) by Each Nostril route once daily. For nasal congestion or runny nose  Dispense: 16 g; Refill: 2  -     cetirizine (ZYRTEC) 10 MG tablet; Take 1 tablet (10 mg total) by mouth every evening.  Dispense: 30 tablet; Refill: 0    advised pt to see ENT if no resolution over next 2 weeks.     Lab Frequency Next Occurrence   Urinalysis Once 08/26/2019   X-Ray Foot Complete Left Once 12/19/2019   Ambulatory referral/consult to Genetics Once 02/18/2020   Renal function panel Once 02/11/2020   CK Once 02/11/2020   Urinalysis Once 02/11/2020   Uric acid Once 03/13/2020   Comprehensive metabolic panel Once 03/13/2020   Ambulatory referral/consult to Ophthalmology Once 05/21/2020   CK     Angiotensin converting enzyme     C-reactive protein     Sedimentation rate     Comprehensive metabolic panel     Comprehensive metabolic panel         Problem List Items Addressed This Visit     None      Visit Diagnoses     Upper respiratory  tract infection, unspecified type    -  Primary    Relevant Medications    fluticasone propionate (FLONASE) 50 mcg/actuation nasal spray    cetirizine (ZYRTEC) 10 MG tablet          No follow-ups on file.

## 2020-07-09 NOTE — PROGRESS NOTES
Digital Medicine: Health  Follow-Up    The history is provided by the patient.   Follow Up  Follow-up reason(s): reading review    Called to follow up with patient.     His readings in office are better controlled than his reading at home. Patient states he thinks something is wrong with the machine.     Recommended patient take his cuff to his clinic or next dr appointment to have the readings compared. Might have to get his cuff exchanged for a new one.           INTERVENTION(S)  encouragement/support and denied questions    PLAN  patient verbalizes understanding and continue monitoring    Will check in with patient in 4-6 weeks      There are no preventive care reminders to display for this patient.    Last 5 Patient Entered Readings                                      Current 30 Day Average: 137/91     Recent Readings 7/2/2020 7/1/2020 6/29/2020 6/26/2020 6/25/2020    SBP (mmHg) 142 137 130 115 139    DBP (mmHg) 87 89 95 85 89    Pulse 100 94 86 78 81                      Diet Screening   No change to diet.      Physical Activity Screening   No change to exercise routine.          SDOH

## 2020-07-22 ENCOUNTER — TELEPHONE (OUTPATIENT)
Dept: INTERNAL MEDICINE | Facility: CLINIC | Age: 45
End: 2020-07-22

## 2020-07-22 ENCOUNTER — LAB VISIT (OUTPATIENT)
Dept: INTERNAL MEDICINE | Facility: CLINIC | Age: 45
End: 2020-07-22
Payer: OTHER GOVERNMENT

## 2020-07-22 DIAGNOSIS — R51.9 ACUTE INTRACTABLE HEADACHE, UNSPECIFIED HEADACHE TYPE: Primary | ICD-10-CM

## 2020-07-22 DIAGNOSIS — R51.9 ACUTE INTRACTABLE HEADACHE, UNSPECIFIED HEADACHE TYPE: ICD-10-CM

## 2020-07-22 PROCEDURE — U0003 INFECTIOUS AGENT DETECTION BY NUCLEIC ACID (DNA OR RNA); SEVERE ACUTE RESPIRATORY SYNDROME CORONAVIRUS 2 (SARS-COV-2) (CORONAVIRUS DISEASE [COVID-19]), AMPLIFIED PROBE TECHNIQUE, MAKING USE OF HIGH THROUGHPUT TECHNOLOGIES AS DESCRIBED BY CMS-2020-01-R: HCPCS

## 2020-07-22 NOTE — TELEPHONE ENCOUNTER
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 He has a sore throat and headache. covid test ordered. Pt scheduled

## 2020-07-22 NOTE — TELEPHONE ENCOUNTER
----- Message from Arlene Mcdonald sent at 7/22/2020 10:04 AM CDT -----  Regarding: calling for order to get COVID  Contact: patirnt  Please call patient @ [phone]. thanks

## 2020-07-24 LAB — SARS-COV-2 RNA RESP QL NAA+PROBE: NOT DETECTED

## 2020-08-24 ENCOUNTER — PATIENT OUTREACH (OUTPATIENT)
Dept: OTHER | Facility: OTHER | Age: 45
End: 2020-08-24

## 2020-09-14 ENCOUNTER — OFFICE VISIT (OUTPATIENT)
Dept: INTERNAL MEDICINE | Facility: CLINIC | Age: 45
End: 2020-09-14
Payer: OTHER GOVERNMENT

## 2020-09-14 VITALS
OXYGEN SATURATION: 99 % | TEMPERATURE: 97 F | BODY MASS INDEX: 33.6 KG/M2 | WEIGHT: 214.5 LBS | DIASTOLIC BLOOD PRESSURE: 88 MMHG | HEART RATE: 79 BPM | SYSTOLIC BLOOD PRESSURE: 118 MMHG

## 2020-09-14 DIAGNOSIS — M54.50 ACUTE LEFT-SIDED LOW BACK PAIN WITHOUT SCIATICA: Primary | ICD-10-CM

## 2020-09-14 DIAGNOSIS — M62.838 MUSCLE SPASM: ICD-10-CM

## 2020-09-14 DIAGNOSIS — N52.9 ERECTILE DYSFUNCTION, UNSPECIFIED ERECTILE DYSFUNCTION TYPE: ICD-10-CM

## 2020-09-14 DIAGNOSIS — Z23 NEED FOR INFLUENZA VACCINATION: ICD-10-CM

## 2020-09-14 DIAGNOSIS — S39.012A STRAIN OF LUMBAR REGION, INITIAL ENCOUNTER: ICD-10-CM

## 2020-09-14 DIAGNOSIS — R12 HEART BURN: ICD-10-CM

## 2020-09-14 PROCEDURE — 99214 OFFICE O/P EST MOD 30 MIN: CPT | Mod: PBBFAC | Performed by: INTERNAL MEDICINE

## 2020-09-14 PROCEDURE — 99999 PR PBB SHADOW E&M-EST. PATIENT-LVL IV: ICD-10-PCS | Mod: PBBFAC,,, | Performed by: INTERNAL MEDICINE

## 2020-09-14 PROCEDURE — 99214 PR OFFICE/OUTPT VISIT, EST, LEVL IV, 30-39 MIN: ICD-10-PCS | Mod: 25,S$PBB,, | Performed by: INTERNAL MEDICINE

## 2020-09-14 PROCEDURE — 99214 OFFICE O/P EST MOD 30 MIN: CPT | Mod: 25,S$PBB,, | Performed by: INTERNAL MEDICINE

## 2020-09-14 PROCEDURE — 90686 IIV4 VACC NO PRSV 0.5 ML IM: CPT | Mod: PBBFAC

## 2020-09-14 PROCEDURE — 99999 PR PBB SHADOW E&M-EST. PATIENT-LVL IV: CPT | Mod: PBBFAC,,, | Performed by: INTERNAL MEDICINE

## 2020-09-14 RX ORDER — METHOCARBAMOL 750 MG/1
TABLET, FILM COATED ORAL
Qty: 30 TABLET | Refills: 0 | Status: SHIPPED | OUTPATIENT
Start: 2020-09-14 | End: 2020-10-19 | Stop reason: SDUPTHER

## 2020-09-14 RX ORDER — FAMOTIDINE 20 MG/1
20 TABLET, FILM COATED ORAL 2 TIMES DAILY PRN
Qty: 60 TABLET | Refills: 0 | Status: SHIPPED | OUTPATIENT
Start: 2020-09-14 | End: 2021-06-14 | Stop reason: SDUPTHER

## 2020-09-14 RX ORDER — TADALAFIL 20 MG/1
TABLET ORAL
Qty: 15 TABLET | Refills: 0 | Status: SHIPPED | OUTPATIENT
Start: 2020-09-14 | End: 2020-09-24 | Stop reason: SDUPTHER

## 2020-09-14 RX ORDER — MELOXICAM 15 MG/1
15 TABLET ORAL DAILY
Qty: 15 TABLET | Refills: 0 | Status: SHIPPED | OUTPATIENT
Start: 2020-09-14 | End: 2020-10-19 | Stop reason: SDUPTHER

## 2020-09-14 NOTE — PROGRESS NOTES
Subjective:      Patient ID: Elder Hernandez is a 45 y.o. male.    Chief Complaint: Back Pain    Back Pain  This is a recurrent problem. The current episode started 1 to 4 weeks ago. The problem occurs constantly. The problem has been waxing and waning since onset. The pain is present in the lumbar spine and gluteal. The quality of the pain is described as aching. The pain does not radiate. The pain is moderate. The pain is the same all the time. The symptoms are aggravated by bending, twisting and standing. Stiffness is present in the morning. Pertinent negatives include no abdominal pain, bladder incontinence, bowel incontinence, chest pain, dysuria, fever, headaches, leg pain, numbness, paresis, paresthesias or pelvic pain. He has tried heat and ice for the symptoms. The treatment provided no relief.        44 yo with   Patient Active Problem List   Diagnosis    PTSD (post-traumatic stress disorder)    Irritable bowel syndrome with diarrhea    Chronic kidney disease    Migraine with aura and without status migrainosus, not intractable    Erectile dysfunction    HSV-2 infection    HTN (hypertension)    Benign prostatic hyperplasia without lower urinary tract symptoms    Arthritis of both knees    Gout    Bilateral foot pain    FSGS (focal segmental glomerulosclerosis)    Colon polyp    Muscle tone increased    Muscle tightness     Past Medical History:   Diagnosis Date    Acid reflux     Gout     Hypertension     Kidney problem     PTSD (post-traumatic stress disorder)     Sleep apnea      Here today c/o back pain. Pt feels viagra not helpful. Would like to try alternative. Has had ED [facility]    Review of Systems   Constitutional: Negative for chills and fever.   HENT: Negative for ear pain and sore throat.    Respiratory: Negative for cough.    Cardiovascular: Negative for chest pain.   Gastrointestinal: Negative for abdominal pain, blood in stool and bowel  incontinence.   Genitourinary: Negative for bladder incontinence, dysuria, hematuria and pelvic pain.   Musculoskeletal: Positive for back pain.   Neurological: Negative for seizures, syncope, numbness, headaches and paresthesias.     Objective:   /88 (BP Location: Left arm, Patient Position: Sitting, BP Method: Large (Manual))   Pulse 79   Temp 97.3 °F (36.3 °C) (Tympanic)   Wt 97.3 kg (214 lb 8.1 oz)   SpO2 99%   BMI 33.60 kg/m²     Physical Exam  Constitutional:       General: He is not in acute distress.     Appearance: He is well-developed.   Eyes:      Conjunctiva/sclera: Conjunctivae normal.   Cardiovascular:      Rate and Rhythm: Normal rate.   Pulmonary:      Effort: Pulmonary effort is normal.      Breath sounds: Normal breath sounds.   Musculoskeletal:      Right shoulder: He exhibits normal range of motion, no tenderness and no bony tenderness.      Lumbar back: He exhibits normal range of motion, no tenderness and no swelling.      Comments: Pain reproduced with bending and rising from bent position.    Skin:     General: Skin is warm and dry.   Neurological:      Mental Status: He is oriented to person, place, and time.      Motor: No weakness.      Gait: Gait normal.      Deep Tendon Reflexes: Reflexes normal.      Comments: slt neg bart     Psychiatric:         Behavior: Behavior normal.         Thought Content: Thought content normal.         Assessment:     1. Acute left-sided low back pain without sciatica    2. Need for influenza vaccination    3. Muscle spasm    4. Heart burn    5. Strain of lumbar region, initial encounter    6. Erectile dysfunction, unspecified erectile dysfunction type      Plan:   Acute left-sided low back pain without sciatica    Need for influenza vaccination  -     Influenza - Quadrivalent *Preferred* (6 months+) (PF)    Muscle spasm    Heart burn  -     famotidine (PEPCID) 20 MG tablet; Take 1 tablet (20 mg total) by mouth 2 (two) times daily as needed for  Heartburn.  Dispense: 60 tablet; Refill: 0    Strain of lumbar region, initial encounter  -     meloxicam (MOBIC) 15 MG tablet; Take 1 tablet (15 mg total) by mouth once daily. For pain and inflammation  Dispense: 15 tablet; Refill: 0  -     methocarbamoL (ROBAXIN) 750 MG Tab; Take one or two tablets by mouth three times daily as needed for muscle spasm.  Dispense: 30 tablet; Refill: 0    Erectile dysfunction, unspecified erectile dysfunction type  -     tadalafiL (CIALIS) 20 MG Tab; Take 1/2 to 1 tablet by mouth once daily as needed for sexual activity.  Dispense: 15 tablet; Refill: 0        Lab Frequency Next Occurrence   X-Ray Foot Complete Left Once 12/19/2019   Ambulatory referral/consult to Genetics Once 02/18/2020   Renal function panel Once 02/11/2020   CK Once 02/11/2020   Urinalysis Once 02/11/2020   Uric acid Once 03/13/2020   Comprehensive metabolic panel Once 03/13/2020   Ambulatory referral/consult to Ophthalmology Once 05/21/2020   CK     Angiotensin converting enzyme     C-reactive protein     Sedimentation rate     Comprehensive metabolic panel     Comprehensive metabolic panel         Problem List Items Addressed This Visit        Renal/    Erectile dysfunction    Relevant Medications    tadalafiL (CIALIS) 20 MG Tab      Other Visit Diagnoses     Acute left-sided low back pain without sciatica    -  Primary    Need for influenza vaccination        Relevant Orders    Influenza - Quadrivalent *Preferred* (6 months+) (PF) (Completed)    Muscle spasm        Heart burn        Relevant Medications    famotidine (PEPCID) 20 MG tablet    Strain of lumbar region, initial encounter        Relevant Medications    meloxicam (MOBIC) 15 MG tablet    methocarbamoL (ROBAXIN) 750 MG Tab          Follow up in about 6 months (around 3/14/2021), or if symptoms worsen or fail to improve.

## 2020-09-18 ENCOUNTER — PATIENT OUTREACH (OUTPATIENT)
Dept: OTHER | Facility: OTHER | Age: 45
End: 2020-09-18

## 2020-09-18 NOTE — PROGRESS NOTES
LVM to discuss BP readings and medications.     BP remains elevated. Patient recently saw PCP for back pain, on NSAID.   Compliance data shows lag in fills. Need for new RX for amlodipine.     Last 5 Patient Entered Readings                                      Current 30 Day Average: 135/93     Recent Readings 9/15/2020 9/14/2020 9/14/2020 9/1/2020 8/31/2020    SBP (mmHg) 150 131 140 127 141    DBP (mmHg) 96 101 110 89 94    Pulse 75 99 94 94 79        Hypertension Medications             amLODIPine (NORVASC) 5 MG tablet Take 1 tablet (5 mg total) by mouth once daily.    losartan (COZAAR) 50 MG tablet Take 1 tablet (50 mg total) by mouth once daily.    prazosin (MINIPRESS) 2 MG Cap Take 1 capsule (2 mg total) by mouth every evening.        Recommend encouraging compliance. If patient does not endorse compliance issue, consider increase in amlodipine dosing to target DBP control. If intolerant, consider HCTZ vs interchange losartan to valsartan and advance dose.

## 2020-09-23 ENCOUNTER — TELEPHONE (OUTPATIENT)
Dept: INTERNAL MEDICINE | Facility: CLINIC | Age: 45
End: 2020-09-23

## 2020-09-23 DIAGNOSIS — N52.9 ERECTILE DYSFUNCTION, UNSPECIFIED ERECTILE DYSFUNCTION TYPE: ICD-10-CM

## 2020-09-23 NOTE — TELEPHONE ENCOUNTER
----- Message from Cindy Hernandez sent at 9/23/2020 12:45 PM CDT -----  Regarding: medication  Contact: pt  Wants to talk with dr about increasing the # of pills for Cialis.  Cutting them in half is not working.  Dr states he would increase the # of pills.  Pt can be reached at [phone]

## 2020-09-23 NOTE — TELEPHONE ENCOUNTER
Pt states taking 1/2 of cialis is not working. He stated you would send more pills if this were the case.

## 2020-09-24 RX ORDER — TADALAFIL 20 MG/1
TABLET ORAL
Qty: 15 TABLET | Refills: 0 | Status: SHIPPED | OUTPATIENT
Start: 2020-09-24 | End: 2021-02-05 | Stop reason: SDUPTHER

## 2020-10-08 NOTE — PROGRESS NOTES
"Digital Medicine: Health  Follow-Up    The history is provided by the patient.             Reason for review: Blood pressure not at goal        Topics Covered on Call: physical activity    Additional Follow-up details: Patient states he still needs to take his [facility] cuff to the [facility] to have it "re-calibrated". Patient states he should be able to take it in some time next week    Patient states he's complying with anti-htn med regimen.    Encouraged patient to ensure he's following a low sodium diet and limit salty foods to boost effectiveness of medication and lower DBP              Diet-no change to diet    No change to diet.        Physical Activity-Change      Additional physical activity details: Patient states he is working out 5 days per week      Medication Adherence-Medication adherence was assessed.      Substance, Sleep, Stress-Not assessed      Continue current diet/physical activity routine.  Provided patient education.       Addressed patient questions and patient has my contact information if needed prior to next outreach. Patient verbalizes understanding.      Explained the importance of self-monitoring and medication adherence. Encouraged the patient to communicate with their health  for lifestyle modifications to help improve or maintain a healthy lifestyle.               There are no preventive care reminders to display for this patient.      Last 5 Patient Entered Readings                                      Current 30 Day Average: 134/93     Recent Readings 10/8/2020 10/7/2020 10/6/2020 10/5/2020 10/5/2020    SBP (mmHg) 119 145 137 134 141    DBP (mmHg) 88 92 87 92 101    Pulse 96 64 94 93 87               "

## 2020-10-15 ENCOUNTER — TELEPHONE (OUTPATIENT)
Dept: INTERNAL MEDICINE | Facility: CLINIC | Age: 45
End: 2020-10-15

## 2020-10-15 DIAGNOSIS — M25.551 HIP PAIN, RIGHT: Primary | ICD-10-CM

## 2020-10-15 DIAGNOSIS — G89.29 CHRONIC LOW BACK PAIN, UNSPECIFIED BACK PAIN LATERALITY, UNSPECIFIED WHETHER SCIATICA PRESENT: ICD-10-CM

## 2020-10-15 DIAGNOSIS — M54.50 CHRONIC LOW BACK PAIN, UNSPECIFIED BACK PAIN LATERALITY, UNSPECIFIED WHETHER SCIATICA PRESENT: ICD-10-CM

## 2020-10-15 DIAGNOSIS — Z00.00 PHYSICAL EXAM: Primary | ICD-10-CM

## 2020-10-15 NOTE — TELEPHONE ENCOUNTER
Spoke with patient and he informed me that the medication isn't working for his back and wanted a referral to physiatry. And also wanted to get his testosterone levels check. Labs are pended.    JEFF Gunn

## 2020-10-15 NOTE — TELEPHONE ENCOUNTER
----- Message from Teodora Herrera sent at 10/15/2020 11:33 AM CDT -----  Regarding: Returning a missed call  Type:  Patient Returning Call    Who Called: Pt   Who Left Message for Patient:nurse   Does the patient know what this is regarding?: no   Would the patient rather a call back or a response via [facility]? Call back   Best Call Back Number: [phone] (home)   Additional Information: n/a

## 2020-10-15 NOTE — TELEPHONE ENCOUNTER
[facility]. He verbalized understanding.

## 2020-10-16 ENCOUNTER — OFFICE VISIT (OUTPATIENT)
Dept: ORTHOPEDICS | Facility: CLINIC | Age: 45
End: 2020-10-16
Payer: OTHER GOVERNMENT

## 2020-10-16 ENCOUNTER — PATIENT OUTREACH (OUTPATIENT)
Dept: ADMINISTRATIVE | Facility: OTHER | Age: 45
End: 2020-10-16

## 2020-10-16 ENCOUNTER — HOSPITAL ENCOUNTER (OUTPATIENT)
Dept: RADIOLOGY | Facility: HOSPITAL | Age: 45
Discharge: HOME | End: 2020-10-16
Attending: FAMILY MEDICINE
Payer: OTHER GOVERNMENT

## 2020-10-16 VITALS
SYSTOLIC BLOOD PRESSURE: 156 MMHG | DIASTOLIC BLOOD PRESSURE: 102 MMHG | WEIGHT: 214.5 LBS | HEART RATE: 61 BPM | BODY MASS INDEX: 33.67 KG/M2 | HEIGHT: 67 IN

## 2020-10-16 DIAGNOSIS — G89.29 CHRONIC LOW BACK PAIN, UNSPECIFIED BACK PAIN LATERALITY, UNSPECIFIED WHETHER SCIATICA PRESENT: ICD-10-CM

## 2020-10-16 DIAGNOSIS — M25.551 HIP PAIN, RIGHT: ICD-10-CM

## 2020-10-16 DIAGNOSIS — M54.50 CHRONIC LOW BACK PAIN, UNSPECIFIED BACK PAIN LATERALITY, UNSPECIFIED WHETHER SCIATICA PRESENT: Primary | ICD-10-CM

## 2020-10-16 DIAGNOSIS — M25.551 BILATERAL HIP PAIN: ICD-10-CM

## 2020-10-16 DIAGNOSIS — G89.29 CHRONIC LOW BACK PAIN, UNSPECIFIED BACK PAIN LATERALITY, UNSPECIFIED WHETHER SCIATICA PRESENT: Primary | ICD-10-CM

## 2020-10-16 DIAGNOSIS — M25.552 BILATERAL HIP PAIN: ICD-10-CM

## 2020-10-16 DIAGNOSIS — M54.50 CHRONIC LOW BACK PAIN, UNSPECIFIED BACK PAIN LATERALITY, UNSPECIFIED WHETHER SCIATICA PRESENT: ICD-10-CM

## 2020-10-16 DIAGNOSIS — M17.0 ARTHRITIS OF BOTH KNEES: Primary | ICD-10-CM

## 2020-10-16 PROCEDURE — 99213 OFFICE O/P EST LOW 20 MIN: CPT | Mod: PBBFAC,25 | Performed by: FAMILY MEDICINE

## 2020-10-16 PROCEDURE — 99214 OFFICE O/P EST MOD 30 MIN: CPT | Mod: S$PBB,,, | Performed by: FAMILY MEDICINE

## 2020-10-16 PROCEDURE — 99214 PR OFFICE/OUTPT VISIT, EST, LEVL IV, 30-39 MIN: ICD-10-PCS | Mod: S$PBB,,, | Performed by: FAMILY MEDICINE

## 2020-10-16 PROCEDURE — 73502 X-RAY EXAM HIP UNI 2-3 VIEWS: CPT | Mod: TC,RT

## 2020-10-16 PROCEDURE — 73502 XR HIP 2 VIEW RIGHT: ICD-10-PCS | Mod: 26,RT,, | Performed by: RADIOLOGY

## 2020-10-16 PROCEDURE — 73502 X-RAY EXAM HIP UNI 2-3 VIEWS: CPT | Mod: 26,RT,, | Performed by: RADIOLOGY

## 2020-10-16 PROCEDURE — 99999 PR PBB SHADOW E&M-EST. PATIENT-LVL III: CPT | Mod: PBBFAC,,, | Performed by: FAMILY MEDICINE

## 2020-10-16 PROCEDURE — 99999 PR PBB SHADOW E&M-EST. PATIENT-LVL III: ICD-10-PCS | Mod: PBBFAC,,, | Performed by: FAMILY MEDICINE

## 2020-10-16 NOTE — LETTER
October 16, 2020      Tommie Baltazar MD  [address] [place] [address]           [facility]  [address] [facility] [address]  Phone: [phone]  Fax: [phone]          Patient: Elder Hernandez   MR Number: [id number]   YOB: 1975   Date of Visit: 10/16/2020       Dear Dr. Tommie Baltazar:    Thank you for referring Elder Hernandez to me for evaluation. Attached you will find relevant portions of my assessment and plan of care.    If you have questions, please do not hesitate to call me. I look forward to following Elder Hernandez along with you.    Sincerely,    Torito Damon MD    Enclosure  CC:  No Recipients    If you would like to receive this communication electronically, please contact [email or web address] or [phone] to request more information on [employer] access.    For providers and/or their staff who would like to refer a patient to [facility], please contact us through our one-stop-shop provider referral line, [facility], at [phone].    If you feel you have received this communication in error or would no longer like to receive these types of communications, please e-mail [email or web address]

## 2020-10-16 NOTE — PROGRESS NOTES
"Subjective:     Patient ID: Elder Hernandez is a 45 y.o. male.    Chief Complaint: Pain of the Spine and Pain of the Right Hip      HPI: patient with chronic history of back pain sometimes into 1 or both hips and started with the work in the  about 5 years ago.  Problem -   Recent flare up over the last few months of low back pain and sometimes causes a feeling of "numbness" into the posterior right thigh    Duration -  Several months    Severity -    Pain -  7/10   Limitations -  Affects physical exercise at times    Previous Treatment -  Physical therapy several years ago but did not feel like it was helpful at the time    Other Information -  Takes meloxicam and Robaxin as needed     history of bilateral knee pain but has been manageable the last few months.  Blood pressure elevated today and patient will monitor this follow-up with primary care as n      Past Medical History:   Diagnosis Date    Acid reflux     Gout     Hypertension     Kidney problem     PTSD (post-traumatic stress disorder)     Sleep apnea      Past Surgical History:   Procedure Laterality Date    COLONOSCOPY N/A 10/8/2019    Procedure: COLONOSCOPY;  Surgeon: Niraj Grey MD;  [facility];  Service: Endoscopy;  Laterality: N/A;    RENAL BIOPSY Right 2015    SHOULDER SURGERY Left 2015     Family History   Problem Relation Age of Onset    No Known Problems Mother     No Known Problems Father      Social History     Socioeconomic History    Marital status:      Spouse name: Not on file    Number of children: 4    Years of education: Not on file    Highest education level: Not on file   Occupational History    Occupation: [employer]   Social Needs    Financial resource strain: Not on file    Food insecurity     Worry: Not on file     Inability: Not on file    Transportation needs     Medical: Not on file     Non-medical: Not on file   Tobacco Use    Smoking status: Never Smoker    Smokeless " tobacco: Never Used   Substance and Sexual Activity    Alcohol use: Yes     Comment: socially    Drug use: Never    Sexual activity: Yes     Partners: Female     Birth control/protection: None   Lifestyle    Physical activity     Days per week: Not on file     Minutes per session: Not on file    Stress: Rather much   Relationships    Social connections     Talks on phone: Not on file     Gets together: Not on file     Attends [employer] service: Not on file     Active member of club or organization: Not on file     Attends meetings of clubs or organizations: Not on file     Relationship status: Not on file   Other Topics Concern    Not on file   Social History Narrative    [employer], no smokers or pets in household.       Current Outpatient Medications:     amLODIPine (NORVASC) 5 MG tablet, Take 1 tablet (5 mg total) by mouth once daily., Disp: 90 tablet, Rfl: 1    cetirizine (ZYRTEC) 10 MG tablet, Take 1 tablet (10 mg total) by mouth every evening., Disp: 30 tablet, Rfl: 0    diclofenac sodium (VOLTAREN) 1 % Gel, Apply 2 grams topically once daily., Disp: 1 Tube, Rfl: 2    dicyclomine (BENTYL) 20 mg tablet, Take 1 tablet (20 mg total) by mouth 3 (three) times daily as needed (abdominal pain)., Disp: 90 tablet, Rfl: 0    famotidine (PEPCID) 20 MG tablet, Take 1 tablet (20 mg total) by mouth 2 (two) times daily as needed for Heartburn., Disp: 60 tablet, Rfl: 0    febuxostat (ULORIC) 40 mg Tab, Take 1 tablet (40 mg total) by mouth once daily., Disp: 90 tablet, Rfl: 1    losartan (COZAAR) 50 MG tablet, Take 1 tablet (50 mg total) by mouth once daily., Disp: 90 tablet, Rfl: 3    meloxicam (MOBIC) 15 MG tablet, Take 1 tablet (15 mg total) by mouth once daily. For pain and inflammation, Disp: 15 tablet, Rfl: 0    methocarbamoL (ROBAXIN) 750 MG Tab, Take one or two tablets by mouth three times daily as needed for muscle spasm., Disp: 30 tablet, Rfl: 0    MULTIVITAMIN ORAL, Take 1 tablet by mouth once  daily., Disp: , Rfl:     prazosin (MINIPRESS) 2 MG Cap, Take 1 capsule (2 mg total) by mouth every evening. (Patient taking differently: Take 2 mg by mouth as needed. ), Disp: 90 capsule, Rfl: 3    sertraline (ZOLOFT) 100 MG tablet, Take 1 tablet (100 mg total) by mouth once daily., Disp: 90 tablet, Rfl: 3    tadalafiL (CIALIS) 20 MG Tab, Take 1/2 to 1 tablet by mouth once daily as needed for sexual activity., Disp: 15 tablet, Rfl: 0    traZODone (DESYREL) 150 MG tablet, Take 1 tablet (150 mg total) by mouth every evening., Disp: 90 tablet, Rfl: 2    valACYclovir (VALTREX) 500 MG tablet, Take 1 tablet (500 mg total) by mouth 2 (two) times daily as needed., Disp: 30 tablet, Rfl: 0    colchicine (COLCRYS) 0.6 mg tablet, Take 1 tablet (0.6 mg total) by mouth once daily., Disp: 30 tablet, Rfl: 2    finasteride (PROSCAR) 5 mg tablet, Take 1 tablet (5 mg total) by mouth once daily., Disp: 90 tablet, Rfl: 0    rizatriptan (MAXALT-MLT) 10 MG disintegrating tablet, Take 1 tablet (10 mg total) by mouth daily as needed for Migraine. May repeat in 2 hours if needed, Disp: 30 tablet, Rfl: 0    topiramate (TOPAMAX) 25 MG tablet, Take one tablet at night for first week, then take 1 tablet (25 mg total) by mouth 2 (two) times daily., Disp: 180 tablet, Rfl: 0  Review of patient's allergies indicates:  No Known Allergies  Review of Systems   Constitutional: Negative for chills, fever and weight loss.   Respiratory: Negative for shortness of breath.    Cardiovascular: Negative for chest pain and palpitations.       Objective:   Body mass index is 33.6 kg/m².  Vitals:    10/16/20 1132   BP: (!) 156/102   Pulse: 61           Ortho/SPM Exam  General - A&O, NAD.     Respiratory Effort - Normal    Extremity (Body Part) -   Low back     -No acute deformity/swelling    ROM -  10° extension and 30° flexion.  10 degree side bending bilateral and 30° bilateral rotation with mild discomfort with rotation to the r    TTP - mild diffuse  tenderness to palpation right paralumbar L3-L4    Stability - good    Strength -  4/5    Neurovascular Intact -  Negative straight leg raise knee jerk and ankle jerk 1+ bilaterally    Other -     Psychiatric - Affect & Cognition WNL      Plan for Improvement -  Physical therapy    Meloxicam and  Robaxin      IMAGING: X-Ray Hip 2 or 3 views Right  Narrative: EXAMINATION:  XR HIP 2 VIEW RIGHT    CLINICAL HISTORY:  Pain in right hip    TECHNIQUE:  AP view of the pelvis and frog leg lateral view of the right hip were performed.    COMPARISON:  None    FINDINGS:  No fracture identified.  No evidence of femoral head avascular necrosis.  Hip joint spaces are normal.  Sacroiliac joints are normal.  Multiple pelvic phleboliths are present.  Impression: No acute abnormality.  As above.    Electronically signed by: Abisai Spencer  Date:    10/16/2020  Time:    11:21       Radiographs / Imaging : Relevant imaging results reviewed by me and interpreted by me, discussed with the patient and / or family - radiographs few by me and agree with the reading above and discussed with patient.  Updated back x-rays will be obtained soon.  Patient states he had a back MRI few years ago which showed a few bulges but no acute surgical problems at the time.      Assessment:     Encounter Diagnoses   Name Primary?    Chronic low back pain, unspecified back pain laterality, unspecified whether sciatica present     Arthritis of both knees Yes    Bilateral hip pain         Plan:   Arthritis of both knees    Chronic low back pain, unspecified back pain laterality, unspecified whether sciatica present  -     Ambulatory referral/consult to Physiatry    Bilateral hip pain        The patient verbalized good understanding of the medical issues discussed today and expressed appreciation for the care provided.  Patient was given the opportunity to ask questions and be an active participant in their medical care. Patient had no further questions or  concerns at this time.     The patient was encouraged to participate in appropriate physical activity.      Torito Damon M.D.  [facility]        This note was dictated using voice recognition software and may have sound a like errors.

## 2020-10-19 ENCOUNTER — OFFICE VISIT (OUTPATIENT)
Dept: PODIATRY | Facility: CLINIC | Age: 45
End: 2020-10-19
Payer: OTHER GOVERNMENT

## 2020-10-19 VITALS
SYSTOLIC BLOOD PRESSURE: 136 MMHG | BODY MASS INDEX: 33.59 KG/M2 | HEIGHT: 67 IN | WEIGHT: 214 LBS | DIASTOLIC BLOOD PRESSURE: 91 MMHG | HEART RATE: 57 BPM

## 2020-10-19 DIAGNOSIS — M76.821 POSTERIOR TIBIAL TENDON DYSFUNCTION (PTTD) OF BOTH LOWER EXTREMITIES: ICD-10-CM

## 2020-10-19 DIAGNOSIS — M79.671 INFLAMMATORY HEEL PAIN, RIGHT: ICD-10-CM

## 2020-10-19 DIAGNOSIS — M21.41 PES PLANUS OF BOTH FEET: ICD-10-CM

## 2020-10-19 DIAGNOSIS — M72.2 PLANTAR FASCIITIS: Primary | ICD-10-CM

## 2020-10-19 DIAGNOSIS — S39.012A STRAIN OF LUMBAR REGION, INITIAL ENCOUNTER: ICD-10-CM

## 2020-10-19 DIAGNOSIS — M21.42 PES PLANUS OF BOTH FEET: ICD-10-CM

## 2020-10-19 DIAGNOSIS — M24.573 EQUINUS CONTRACTURE OF ANKLE: ICD-10-CM

## 2020-10-19 DIAGNOSIS — M76.822 POSTERIOR TIBIAL TENDON DYSFUNCTION (PTTD) OF BOTH LOWER EXTREMITIES: ICD-10-CM

## 2020-10-19 PROCEDURE — 99499 NO LOS: ICD-10-PCS | Mod: S$PBB,,, | Performed by: PODIATRIST

## 2020-10-19 PROCEDURE — 99499 UNLISTED E&M SERVICE: CPT | Mod: S$PBB,,, | Performed by: PODIATRIST

## 2020-10-19 PROCEDURE — 99999 PR PBB SHADOW E&M-EST. PATIENT-LVL IV: ICD-10-PCS | Mod: PBBFAC,,, | Performed by: PODIATRIST

## 2020-10-19 PROCEDURE — 99999 PR PBB SHADOW E&M-EST. PATIENT-LVL IV: CPT | Mod: PBBFAC,,, | Performed by: PODIATRIST

## 2020-10-19 PROCEDURE — 20550 NJX 1 TENDON SHEATH/LIGAMENT: CPT | Mod: S$PBB,RT,, | Performed by: PODIATRIST

## 2020-10-19 PROCEDURE — 20550 NJX 1 TENDON SHEATH/LIGAMENT: CPT | Mod: PBBFAC,RT | Performed by: PODIATRIST

## 2020-10-19 PROCEDURE — 99214 OFFICE O/P EST MOD 30 MIN: CPT | Mod: PBBFAC,25 | Performed by: PODIATRIST

## 2020-10-19 PROCEDURE — 20550 PR INJECT TENDON SHEATH/LIGAMENT: ICD-10-PCS | Mod: S$PBB,RT,, | Performed by: PODIATRIST

## 2020-10-19 RX ORDER — MELOXICAM 15 MG/1
15 TABLET ORAL DAILY
Qty: 30 TABLET | Refills: 1 | Status: SHIPPED | OUTPATIENT
Start: 2020-10-19 | End: 2020-11-19

## 2020-10-19 RX ORDER — METHOCARBAMOL 750 MG/1
TABLET, FILM COATED ORAL
Qty: 30 TABLET | Refills: 1 | Status: SHIPPED | OUTPATIENT
Start: 2020-10-19 | End: 2021-05-10

## 2020-10-19 RX ADMIN — TRIAMCINOLONE ACETONIDE 40 MG: 200 INJECTION, SUSPENSION INTRA-ARTICULAR; INTRAMUSCULAR at 04:10

## 2020-10-19 NOTE — PROGRESS NOTES
Subjective:     Patient ID: Elder Hernandez is a 45 y.o. male.    Chief Complaint: Foot Pain (c/o sharp pain to right heel. rates pain 8/10. wears boots with socks. non-diabetic Pt. last seen on 09/14/20 with  PCP Dr. Baltazar)    Elder is a 45 y.o. male who presents to the clinic complaining of heel pain in right, especially with the first step in the morning. The pain is described as Aching and Tight. The onset of the pain was gradual and has worsened over the past several weeks. Elder rates the pain as 8/10. He denies a history of trauma. Prior treatments include injection that helped a lot. Patient admits he has not gotten the custom inserts yet. Patient has no other pedal complaints at this time.     Patient Active Problem List   Diagnosis    PTSD (post-traumatic stress disorder)    Irritable bowel syndrome with diarrhea    Chronic kidney disease    Migraine with aura and without status migrainosus, not intractable    Erectile dysfunction    HSV-2 infection    HTN (hypertension)    Benign prostatic hyperplasia without lower urinary tract symptoms    Arthritis of both knees    Gout    Bilateral foot pain    FSGS (focal segmental glomerulosclerosis)    Colon polyp    Muscle tone increased    Muscle tightness       Medication List with Changes/Refills   Current Medications    AMLODIPINE (NORVASC) 5 MG TABLET    Take 1 tablet (5 mg total) by mouth once daily.    CETIRIZINE (ZYRTEC) 10 MG TABLET    Take 1 tablet (10 mg total) by mouth every evening.    COLCHICINE (COLCRYS) 0.6 MG TABLET    Take 1 tablet (0.6 mg total) by mouth once daily.    DICLOFENAC SODIUM (VOLTAREN) 1 % GEL    Apply 2 grams topically once daily.    DICYCLOMINE (BENTYL) 20 MG TABLET    Take 1 tablet (20 mg total) by mouth 3 (three) times daily as needed (abdominal pain).    FAMOTIDINE (PEPCID) 20 MG TABLET    Take 1 tablet (20 mg total) by mouth 2 (two) times daily as needed for Heartburn.    FEBUXOSTAT (ULORIC) 40 MG  TAB    Take 1 tablet (40 mg total) by mouth once daily.    FINASTERIDE (PROSCAR) 5 MG TABLET    Take 1 tablet (5 mg total) by mouth once daily.    LOSARTAN (COZAAR) 50 MG TABLET    Take 1 tablet (50 mg total) by mouth once daily.    MULTIVITAMIN ORAL    Take 1 tablet by mouth once daily.    PRAZOSIN (MINIPRESS) 2 MG CAP    Take 1 capsule (2 mg total) by mouth every evening.    RIZATRIPTAN (MAXALT-MLT) 10 MG DISINTEGRATING TABLET    Take 1 tablet (10 mg total) by mouth daily as needed for Migraine. May repeat in 2 hours if needed    SERTRALINE (ZOLOFT) 100 MG TABLET    Take 1 tablet (100 mg total) by mouth once daily.    TADALAFIL (CIALIS) 20 MG TAB    Take 1/2 to 1 tablet by mouth once daily as needed for sexual activity.    TOPIRAMATE (TOPAMAX) 25 MG TABLET    Take one tablet at night for first week, then take 1 tablet (25 mg total) by mouth 2 (two) times daily.    TRAZODONE (DESYREL) 150 MG TABLET    Take 1 tablet (150 mg total) by mouth every evening.    VALACYCLOVIR (VALTREX) 500 MG TABLET    Take 1 tablet (500 mg total) by mouth 2 (two) times daily as needed.   Changed and/or Refilled Medications    Modified Medication Previous Medication    MELOXICAM (MOBIC) 15 MG TABLET meloxicam (MOBIC) 15 MG tablet       Take 1 tablet (15 mg total) by mouth once daily. For pain and inflammation.    Take 1 tablet (15 mg total) by mouth once daily. For pain and inflammation    METHOCARBAMOL (ROBAXIN) 750 MG TAB methocarbamoL (ROBAXIN) 750 MG Tab       Take 1 tablet once or twice a day as needed for muscle spasm.    Take one or two tablets by mouth three times daily as needed for muscle spasm.       Review of patient's allergies indicates:  No Known Allergies    Past Surgical History:   Procedure Laterality Date    COLONOSCOPY N/A 10/8/2019    Procedure: COLONOSCOPY;  Surgeon: Niraj Grey MD;  Location: [facility];  Service: Endoscopy;  Laterality: N/A;    RENAL BIOPSY Right 2015    SHOULDER SURGERY Left 2015  "      Family History   Problem Relation Age of Onset    No Known Problems Mother     No Known Problems Father        Social History     Socioeconomic History    Marital status:      Spouse name: Not on file    Number of children: 4    Years of education: Not on file    Highest education level: Not on file   Occupational History    Occupation: [employer]   Social Needs    Financial resource strain: Not on file    Food insecurity     Worry: Not on file     Inability: Not on file    Transportation needs     Medical: Not on file     Non-medical: Not on file   Tobacco Use    Smoking status: Never Smoker    Smokeless tobacco: Never Used   Substance and Sexual Activity    Alcohol use: Yes     Comment: socially    Drug use: Never    Sexual activity: Yes     Partners: Female     Birth control/protection: None   Lifestyle    Physical activity     Days per week: Not on file     Minutes per session: Not on file    Stress: Rather much   Relationships    Social connections     Talks on phone: Not on file     Gets together: Not on file     Attends Episcopalian service: Not on file     Active member of club or organization: Not on file     Attends meetings of clubs or organizations: Not on file     Relationship status: Not on file   Other Topics Concern    Not on file   Social History Narrative    [employer], no smokers or pets in household.       Vitals:    10/19/20 1610   BP: (!) 136/91   Pulse: (!) 57   Weight: 97.1 kg (214 lb)   Height: 5' 7" (1.702 m)   PainSc:   8   PainLoc: Foot       Review of Systems   Constitutional: Negative for chills and fever.   Respiratory: Negative for shortness of breath.    Cardiovascular: Negative for chest pain, palpitations, orthopnea, claudication and leg swelling.   Gastrointestinal: Negative for diarrhea, nausea and vomiting.   Musculoskeletal: Negative for joint pain.   Skin: Negative for rash.   Neurological: Negative for dizziness, tingling, sensory change, focal " weakness and weakness.   Psychiatric/Behavioral: Negative.            Objective:   PHYSICAL EXAM: Apperance: Alert and orient in no distress,well developed, and with good attention to grooming and body habits  Patient presents ambulating in army boots with inserts.   Lower Extremity Exam  VASCULAR: Dorsalis pedis pulses 2/4 bilateral and Posterior Tibial pulses 2/4 bilateral. Capillary fill time <4 seconds bilateral. No edema observed bilateral . Varicosities absent bilateral. Skin temperature of the lower extremities is warm to warm, proximal to distal. Hair growth WNLbilateral.  DERMATOLOGICAL: No skin rashes, subcutaneous nodules, lesions, or ulcers observed bilateral.   NEUROLOGICAL: Light touch, sharp-dull, proprioception all present and equal bilaterally.   MUSCULOSKELETAL:Muscle strength is 5/5 for foot inverters, everters, plantarflexors, and dorsiflexors. Muscle tone is normal. Ankle joints bilateral shows decreased ROM. bilateral ankle ROM shows greater decrease in dorsiflexion with knee extended. Ankle joint ROM is pain free and without crepitus bilateral. Pain to palpation right plantar medial tubercle. Plantar medial aspect of bilateral heels shows tenderness to palpation. No pain on medial-lateral compression of the calcaneus.     TEST RESULTS: Radiographs of bilateral foot/ankle taken reveals Right: There is no radiographic evidence of acute osseous, articular, or soft tissue abnormality.  Joint spaces are preserved.  Left: There is no radiographic evidence of acute osseous, articular, or soft tissue abnormality.  Joint spaces are preserved.          Assessment:   The following prescriptions/orders were written today per listed diagnosis  Plantar fasciitis - Right Foot  -     ORTHOTIC DEVICE (DME)  -     triamcinolone acetonide injection 40 mg    Inflammatory heel pain, right - Right Foot  -     ORTHOTIC DEVICE (DME)  -     triamcinolone acetonide injection 40 mg    Equinus contracture of ankle  -      ORTHOTIC DEVICE (DME)    Posterior tibial tendon dysfunction (PTTD) of both lower extremities  -     ORTHOTIC DEVICE (DME)    Pes planus of both feet  -     ORTHOTIC DEVICE (DME)          Plan:   Plantar fasciitis - Right Foot  -     ORTHOTIC DEVICE (DME)  -     triamcinolone acetonide injection 40 mg    Inflammatory heel pain, right - Right Foot  -     ORTHOTIC DEVICE (DME)  -     triamcinolone acetonide injection 40 mg    Equinus contracture of ankle  -     ORTHOTIC DEVICE (DME)    Posterior tibial tendon dysfunction (PTTD) of both lower extremities  -     ORTHOTIC DEVICE (DME)    Pes planus of both feet  -     ORTHOTIC DEVICE (DME)      I counseled the patient on his conditions, regarding findings of my examination, my impressions, and usual treatment plan.   Reviewed bilateral foot x-rays in exam room with patient.   I explained to the patient that etiology and treatment options for heel pain including rest,  ice messages, stretching exercises, strappings/tappings, NSAID's, injections, new shoegear with orthotic inserts, and/or surgical treatment.   Patient agreed to injection therapy today.  Patient agreed to injection therapy today. After sterilizing the area with an alcohol prep, the affected area was injected with a solution containing 1 cc of 1% lidocaine plain, 1cc of 0.5% Marcaine plain and 1 cc of Kenalog 40%.  Injection area was then covered with band-aid. The patient tolerated the injection well and reported comfort to the area.  I gave written and verbal instructions on heel cord stretching and this was demonstrated for the patient. Patient expressed understanding.  Patient instructed on adequate icing techniques. Patient should ice the affected area at least once per day x 10 minutes for 10 days . I advised the  patient that extra icing would also be beneficial to ensure adequate anti inflammatory effect.   The patient and I reviewed the types of shoes he should be wearing, my recommendation includes  generally the best time of the day for a shoe fitting is the afternoon, shoes with a wide toe box, very good cushion, and tennis shoes with removable inner soles.   Prescription for custom inserts re-written.   Patient to return in 3 months.                   Pradeep Da Silva DPM  [facility]

## 2020-10-20 RX ORDER — TRIAMCINOLONE ACETONIDE 40 MG/ML
40 INJECTION, SUSPENSION INTRA-ARTICULAR; INTRAMUSCULAR
Status: COMPLETED | OUTPATIENT
Start: 2020-10-19 | End: 2020-10-19

## 2020-11-12 ENCOUNTER — CLINICAL SUPPORT (OUTPATIENT)
Dept: REHABILITATION | Facility: HOSPITAL | Age: 45
End: 2020-11-12
Payer: OTHER GOVERNMENT

## 2020-11-12 DIAGNOSIS — M54.50 ACUTE RIGHT-SIDED LOW BACK PAIN WITHOUT SCIATICA: ICD-10-CM

## 2020-11-12 DIAGNOSIS — G89.29 CHRONIC LOW BACK PAIN, UNSPECIFIED BACK PAIN LATERALITY, UNSPECIFIED WHETHER SCIATICA PRESENT: ICD-10-CM

## 2020-11-12 DIAGNOSIS — M54.50 CHRONIC LOW BACK PAIN, UNSPECIFIED BACK PAIN LATERALITY, UNSPECIFIED WHETHER SCIATICA PRESENT: ICD-10-CM

## 2020-11-12 PROCEDURE — 97161 PT EVAL LOW COMPLEX 20 MIN: CPT | Performed by: PHYSICAL THERAPIST

## 2020-11-12 PROCEDURE — 97140 MANUAL THERAPY 1/> REGIONS: CPT | Performed by: PHYSICAL THERAPIST

## 2020-11-12 NOTE — PLAN OF CARE
[facility]  Physical Therapy Initial Evaluation    Name: Elder Hernandez  Clinic Number: [id number]    Therapy Diagnosis:   Encounter Diagnoses   Name Primary?    Chronic low back pain, unspecified back pain laterality, unspecified whether sciatica present     Acute right-sided low back pain without sciatica      Physician: Torito Damon MD    Physician Orders: PT Eval and Treat   Medical Diagnosis from Referral: M54.5,G89.29 (ICD-10-CM) - Chronic low back pain, unspecified back pain laterality, unspecified whether sciatica present  Evaluation Date: 11/12/2020  Authorization Period Expiration: 10/10/2021  Plan of Care Expiration: 12/24/2020  Visit # / Visits authorized: 1/ 9  FOTO: 1/10      Time In: 700  Time Out: 745  Total Billable Time: 45 minutes    Precautions: Standard    Subjective   Date of onset: 3 months ago  History of current condition - Elder reports: degeneration over time,  wear and tear. Low back soreness, stiffness, and pain. Main pains come first thing in AM upon waking up. Pt does have spasm activity over night as well. Pt does have numbness and tingling that runs down RLE at times but not consistent. When pain comes on during a workout, he usually just pushes through. He does take muscle relaxers prior to sleeping with no real effect at this time.      Medical History:   Past Medical History:   Diagnosis Date    Acid reflux     Gout     Hypertension     Kidney problem     PTSD (post-traumatic stress disorder)     Sleep apnea        Surgical History:   Elder Hernandez  has a past surgical history that includes Shoulder surgery (Left, 2015); Renal biopsy (Right, 2015); and Colonoscopy (N/A, 10/8/2019).    Medications:   Elder has a current medication list which includes the following prescription(s): amlodipine, cetirizine, colchicine, diclofenac sodium, dicyclomine, famotidine, febuxostat, finasteride, losartan, meloxicam,  methocarbamol, multivitamin, prazosin, rizatriptan, sertraline, tadalafil, topiramate, trazodone, and valacyclovir.    Allergies:   Review of patient's allergies indicates:  No Known Allergies     Imaging, Hip: No fracture identified.  No evidence of femoral head avascular necrosis.  Hip joint spaces are normal.  Sacroiliac joints are normal.  Multiple pelvic phleboliths are present.    Prior Therapy: Yes  Social History:  lives alone  Occupation:    Prior Level of Function: able to sleep full night with no pain  Current Level of Function: unable to sleep full night without pain    Pain:   Current 4/10, worst 8/10, best 0/10   Location: right back   Description: Aching, Dull and Tight  Aggravating Factors: Sitting, Bending, Night Time, Lifting and Getting out of bed/chair  Easing Factors: nothing    Pts goals: decrease low back pain with activity    Objective     Posture: Rounded shoulders, minimal thoracic kyphosis, increased lumbar lordosis.   Palpation: right side lumbar paraspinals more tenderness and sensitive comparatively to left side.  Sensation: no deficits noted    Range of Motion/Strength:     Thoracolumbar AROM Pain/Dysfunction with Movement   Flexion 68 Pulling   Extension 20 End range discomfort   Right side bending 25    Left side bending 25        Hip Right Left Pain/Dysfunction with Movement   AROM/PROM WNL WNL No ROM limitations but pt did c/o end range right hip flexion pain     Knee Right Left Pain/Dysfunction with Movement   AROM/PROM WNL WNL        L/E MMT Right Left Pain/Dysfunction with Movement   Hip Flexion 4/5 5/5    Hip Extension 4+/5 5/5    Hip Abduction 4/5 4/5    Hip Adduction 5/5 5/5    Knee Flexion 5/5 5/5    Knee Extension 5/5 5/5    Ankle DF 5/5 5/5    Ankle PF 5/5 5/5        Joint Mobility:   - Thoracic: Stiffness into extension plane due to kyphosis  - Lumbar: Stiffness into end range extension, quadrant testing discomfort with right side rotation and extension  combination    Special Tests: SLR (-) on RLE, Piriformis testing (-), Increased sensitivity along lower lumbar facet joints on right side. Lumbar gapping elicited stretch discomfort.       CMS Impairment/Limitation/Restriction for FOTO lumbar spine Survey    Therapist reviewed FOTO scores for Elder Hernandez on 11/12/2020.   FOTO documents entered into [employer] - see Media section.    Limitation Score: 54%         TREATMENT   Treatment Time In: 730  Treatment Time Out: 745  Total Treatment time separate from Evaluation: 15 minutes      Elder received the following manual therapy techniques: Myofacial release and Soft tissue Mobilization were applied to the: lumbar spine for 15 minutes, including:  ASTYM to bilateral lumbar paraspinals  Grade II/III UPA's to right side of lumbar spine        Home Exercises Provided and Patient Education Provided     Education provided:   - home modalities prn  - continue activity at home, adjust sleeping positions.       Assessment   Elder is a 45 y.o. male referred to outpatient Physical Therapy with a medical diagnosis of low back pain. Pt presents with right sided lumbar spine discomfort with increased activity. Pt will benefit from further soft tissue management along with core stability progression.    Pt prognosis is Excellent.   Pt will benefit from skilled outpatient Physical Therapy to address the deficits stated above and in the chart below, provide pt/family education, and to maximize pt's level of independence.     Plan of care discussed with patient: Yes  Pt's spiritual, cultural and educational needs considered and patient is agreeable to the plan of care and goals as stated below:     Anticipated Barriers for therapy: None    Medical Necessity is demonstrated by the following  History  Co-morbidities and personal factors that may impact the plan of care Co-morbidities:   None    Personal Factors:   no deficits     low   Examination  Body Structures and  Functions, activity limitations and participation restrictions that may impact the plan of care Body Regions:   back    Body Systems:    gross symmetry  ROM  strength    Participation Restrictions:   Lifting.    Activity limitations:   Learning and applying knowledge  no deficits    General Tasks and Commands  undertaking a single task    Communication  no deficits    Mobility  Lifting Objects    Self care  no deficits    Domestic Life  doing house work (cleaning house, washing dishes, laundry)    Interactions/Relationships  no deficits    Life Areas  no deficits    Community and Social Life  community life  recreation and leisure         moderate   Clinical Presentation evolving clinical presentation with changing clinical characteristics moderate   Decision Making/ Complexity Score: moderate       Goals:  Long Term Goals (6 Weeks):   1. Pt will be compliant with HEP to supplement PT in decreasing pain with functional mobility.  2. Pt will improve FOTO score to </= 30% limited to decrease perceived limitation with maintaining/changing body position.   3. Pt will perform proper prone planks for over 30 seconds with good control to demonstrate improved core strength.  4. Pt will improve impaired LE myotomes/MMTs to >/=5/5 to improve strength for functional tasks.  5. Pt will report no pain during sleeping promote improved low back pain.    Plan   Plan of care Certification: 11/12/2020 to 12/24/2020.    Outpatient Physical Therapy 2 times weekly for 6 weeks to include the following interventions: Electrical Stimulation IFC, Gait Training, Manual Therapy, Moist Heat/ Ice, Neuromuscular Re-ed, Patient Education, Self Care, Therapeutic Activites and Therapeutic Exercise, ASTYM, Kinesiotaping PRN, Functional Dry Needling    David Pickett, PT, DPT

## 2020-11-13 ENCOUNTER — PATIENT OUTREACH (OUTPATIENT)
Dept: OTHER | Facility: OTHER | Age: 45
End: 2020-11-13

## 2020-11-16 ENCOUNTER — TELEPHONE (OUTPATIENT)
Dept: RHEUMATOLOGY | Facility: CLINIC | Age: 45
End: 2020-11-16

## 2020-11-16 ENCOUNTER — CLINICAL SUPPORT (OUTPATIENT)
Dept: REHABILITATION | Facility: HOSPITAL | Age: 45
End: 2020-11-16
Payer: OTHER GOVERNMENT

## 2020-11-16 DIAGNOSIS — M54.50 ACUTE RIGHT-SIDED LOW BACK PAIN WITHOUT SCIATICA: ICD-10-CM

## 2020-11-16 PROCEDURE — 97110 THERAPEUTIC EXERCISES: CPT | Performed by: PHYSICAL THERAPIST

## 2020-11-16 PROCEDURE — 97140 MANUAL THERAPY 1/> REGIONS: CPT | Performed by: PHYSICAL THERAPIST

## 2020-11-16 NOTE — PROGRESS NOTES
"  Physical Therapy Daily Treatment Note     Name: Elder Quintana David  Clinic Number: [id number]    Therapy Diagnosis:   Encounter Diagnosis   Name Primary?    Acute right-sided low back pain without sciatica      Physician: Torito Damon MD    Visit Date: 11/16/2020    Physician Orders: PT Eval and Treat   Medical Diagnosis from Referral: M54.5,G89.29 (ICD-10-CM) - Chronic low back pain, unspecified back pain laterality, unspecified whether sciatica present  Evaluation Date: 11/12/2020  Authorization Period Expiration: 10/10/2021  Plan of Care Expiration: 12/24/2020  Visit # / Visits authorized: 2/ 9  FOTO: 2/10        Time In: 730  Time Out: 815  Total Billable Time: 45 minutes     Precautions: Standard    Subjective     Pt reports: driving long periods of time over weekend, back was pretty stiff. No spasm activity noted.   He was compliant with home exercise program.  Response to previous treatment: No significant changes  Functional change: None yet    Pain: 3/10  Location: right back      Objective     Elder received the following manual therapy techniques: Myofacial release and Soft tissue Mobilization were applied to the: right lumbar spine for 25 minutes, including:  ASTYM to right side of lumbar spine  FDN to right lumbar multifidi and QL with stim    Elder received therapeutic exercises to develop strength, endurance and core stabilization for 20 minutes including:  Hand heel rocks (mid back stretch) 20" x 5 with right sided bias  Prone planks with slides - 15x each arm  Side planks (R only) 20" x 5  Standing punch outs 25x each way - 4 plates    Home Exercises Provided and Patient Education Provided     Education provided:   - Home modalities prn  - continue activity at home, change sleeping position if possible, lumbar support while driving.         Assessment     Pt continues to have right sided lumbar spine stiffness and discomfort with activity. Pt responds well to manual soft tissue " treatments but did need significant cues for planks and core strengthening. Pt presents with stiffness in both shoulder with flexibility therex as well. Pt continues to have some discomfort stability therex but no radiating symptoms.   Elder is progressing well towards his goals.   Pt prognosis is Excellent.     Pt will continue to benefit from skilled outpatient physical therapy to address the deficits listed in the problem list box on initial evaluation, provide pt/family education and to maximize pt's level of independence in the home and community environment.     Pt's spiritual, cultural and educational needs considered and pt agreeable to plan of care and goals.    Anticipated barriers to physical therapy: None    Goals:   Long Term Goals (6 Weeks):   1. Pt will be compliant with HEP to supplement PT in decreasing pain with functional mobility.  2. Pt will improve FOTO score to </= 30% limited to decrease perceived limitation with maintaining/changing body position.   3. Pt will perform proper prone planks for over 30 seconds with good control to demonstrate improved core strength.  4. Pt will improve impaired LE myotomes/MMTs to >/=5/5 to improve strength for functional tasks.  5. Pt will report no pain during sleeping promote improved low back pain.    Plan     Plan of care Certification: 11/12/2020 to 12/24/2020.     Outpatient Physical Therapy 2 times weekly for 6 weeks to include the following interventions: Electrical Stimulation IFC, Gait Training, Manual Therapy, Moist Heat/ Ice, Neuromuscular Re-ed, Patient Education, Self Care, Therapeutic Activites and Therapeutic Exercise, ASTYM, Kinesiotaping PRN, Functional Dry Needling    David Pickett, PT

## 2020-11-17 ENCOUNTER — OFFICE VISIT (OUTPATIENT)
Dept: RHEUMATOLOGY | Facility: CLINIC | Age: 45
End: 2020-11-17
Payer: OTHER GOVERNMENT

## 2020-11-17 ENCOUNTER — LAB VISIT (OUTPATIENT)
Dept: LAB | Facility: HOSPITAL | Age: 45
End: 2020-11-17
Attending: INTERNAL MEDICINE
Payer: OTHER GOVERNMENT

## 2020-11-17 VITALS
DIASTOLIC BLOOD PRESSURE: 88 MMHG | SYSTOLIC BLOOD PRESSURE: 134 MMHG | BODY MASS INDEX: 33.35 KG/M2 | WEIGHT: 212.5 LBS | HEIGHT: 67 IN

## 2020-11-17 DIAGNOSIS — M1A.9XX0 CHRONIC GOUT WITHOUT TOPHUS, UNSPECIFIED CAUSE, UNSPECIFIED SITE: ICD-10-CM

## 2020-11-17 DIAGNOSIS — M15.9 PRIMARY OSTEOARTHRITIS INVOLVING MULTIPLE JOINTS: ICD-10-CM

## 2020-11-17 DIAGNOSIS — M1A.9XX0 CHRONIC GOUT WITHOUT TOPHUS, UNSPECIFIED CAUSE, UNSPECIFIED SITE: Primary | ICD-10-CM

## 2020-11-17 DIAGNOSIS — G89.29 CHRONIC LOW BACK PAIN, UNSPECIFIED BACK PAIN LATERALITY, UNSPECIFIED WHETHER SCIATICA PRESENT: Primary | ICD-10-CM

## 2020-11-17 DIAGNOSIS — M54.50 CHRONIC LOW BACK PAIN, UNSPECIFIED BACK PAIN LATERALITY, UNSPECIFIED WHETHER SCIATICA PRESENT: Primary | ICD-10-CM

## 2020-11-17 DIAGNOSIS — M10.9 GOUT, UNSPECIFIED CAUSE, UNSPECIFIED CHRONICITY, UNSPECIFIED SITE: ICD-10-CM

## 2020-11-17 DIAGNOSIS — Z71.89 COUNSELING ON HEALTH PROMOTION AND DISEASE PREVENTION: ICD-10-CM

## 2020-11-17 LAB
ALBUMIN SERPL BCP-MCNC: 3.9 G/DL (ref 3.5–5.2)
ALP SERPL-CCNC: 72 U/L (ref 55–135)
ALT SERPL W/O P-5'-P-CCNC: 24 U/L (ref 10–44)
ANION GAP SERPL CALC-SCNC: 7 MMOL/L (ref 8–16)
AST SERPL-CCNC: 28 U/L (ref 10–40)
BILIRUB SERPL-MCNC: 0.5 MG/DL (ref 0.1–1)
BUN SERPL-MCNC: 15 MG/DL (ref 6–20)
CALCIUM SERPL-MCNC: 9.2 MG/DL (ref 8.7–10.5)
CHLORIDE SERPL-SCNC: 104 MMOL/L (ref 95–110)
CO2 SERPL-SCNC: 27 MMOL/L (ref 23–29)
CREAT SERPL-MCNC: 1.7 MG/DL (ref 0.5–1.4)
EST. GFR  (AFRICAN AMERICAN): 54.3 ML/MIN/1.73 M^2
EST. GFR  (NON AFRICAN AMERICAN): 47 ML/MIN/1.73 M^2
GLUCOSE SERPL-MCNC: 83 MG/DL (ref 70–110)
POTASSIUM SERPL-SCNC: 4.7 MMOL/L (ref 3.5–5.1)
PROT SERPL-MCNC: 7.9 G/DL (ref 6–8.4)
SODIUM SERPL-SCNC: 138 MMOL/L (ref 136–145)
URATE SERPL-MCNC: 7.1 MG/DL (ref 3.4–7)

## 2020-11-17 PROCEDURE — 36415 COLL VENOUS BLD VENIPUNCTURE: CPT | Mod: PO

## 2020-11-17 PROCEDURE — 99999 PR PBB SHADOW E&M-EST. PATIENT-LVL III: CPT | Mod: PBBFAC,,, | Performed by: INTERNAL MEDICINE

## 2020-11-17 PROCEDURE — 84550 ASSAY OF BLOOD/URIC ACID: CPT

## 2020-11-17 PROCEDURE — 99213 OFFICE O/P EST LOW 20 MIN: CPT | Mod: PBBFAC,PO | Performed by: INTERNAL MEDICINE

## 2020-11-17 PROCEDURE — 99999 PR PBB SHADOW E&M-EST. PATIENT-LVL III: ICD-10-PCS | Mod: PBBFAC,,, | Performed by: INTERNAL MEDICINE

## 2020-11-17 PROCEDURE — 99214 PR OFFICE/OUTPT VISIT, EST, LEVL IV, 30-39 MIN: ICD-10-PCS | Mod: S$PBB,,, | Performed by: INTERNAL MEDICINE

## 2020-11-17 PROCEDURE — 80053 COMPREHEN METABOLIC PANEL: CPT

## 2020-11-17 PROCEDURE — 99214 OFFICE O/P EST MOD 30 MIN: CPT | Mod: S$PBB,,, | Performed by: INTERNAL MEDICINE

## 2020-11-17 RX ORDER — FEBUXOSTAT 40 MG/1
40 TABLET, FILM COATED ORAL DAILY
Qty: 90 TABLET | Refills: 3 | Status: SHIPPED | OUTPATIENT
Start: 2020-11-17 | End: 2020-11-19

## 2020-11-17 RX ORDER — COLCHICINE 0.6 MG/1
0.6 TABLET ORAL DAILY PRN
Qty: 30 TABLET | Refills: 2 | Status: SHIPPED | OUTPATIENT
Start: 2020-11-17 | End: 2020-11-19

## 2020-11-17 NOTE — PROGRESS NOTES
RHEUMATOLOGY OUTPATIENT CLINIC NOTE    11/17/2020    Attending Rheumatologist: Mannie Holland  Primary Care Provider: Tommie Baltazar MD   Physician Requesting Consultation: No referring provider defined for this encounter.  Chief Complaint/Reason For Consultation:  Gout    Subjective:       HPI  Elder Hernandez is a 45 y.o. Black or  male elevated CK and gout comes for follow-up.    Today  Last seen on mid May.  Remain without features of inflammatory myositis.  Recommend to continue Uloric 40 for gout.  Voices no acute complaints.  No gout attacks since last visit.  Episodic arthralgias with mechanical pattern, no present today.  Tolerating urate lowering therapy without side effects, taking colchicine only as needed.  Denies muscle weakness, joint swelling,  or GI complaints.    Addendum 11/19:  Uric acid not at goal.  Will recommend to increase Uloric to 80 mg daily and resume daily prophylaxis with colchicine.  Last calc CrCl 73.  Repeat labs in 3 months.    Review of Systems   Constitutional: Negative for chills, fever and malaise/fatigue.   Eyes: Negative for pain and redness.   Respiratory: Negative for cough, hemoptysis and shortness of breath.    Cardiovascular: Negative for chest pain and leg swelling.   Gastrointestinal: Negative for abdominal pain, blood in stool and melena.   Genitourinary: Negative for dysuria and hematuria.   Musculoskeletal: Negative for falls and joint pain.   Skin: Negative for rash.   Neurological: Negative for tingling and focal weakness.   Psychiatric/Behavioral: Negative for memory loss. The patient does not have insomnia.      Chronic comorbid conditions affecting medical decision making today:  Past Medical History:   Diagnosis Date    Acid reflux     Gout     Hypertension     Kidney problem     PTSD (post-traumatic stress disorder)     Sleep apnea    · FSGS  · History of rhabdomyolysis    Past Surgical History:   Procedure Laterality  Date    COLONOSCOPY N/A 10/8/2019    Procedure: COLONOSCOPY;  Surgeon: Niraj Grey MD;  Location: [facility];  Service: Endoscopy;  Laterality: N/A;    RENAL BIOPSY Right 2015    SHOULDER SURGERY Left 2015     Family History   Problem Relation Age of Onset    No Known Problems Mother     No Known Problems Father      Social History     Substance and Sexual Activity   Alcohol Use Yes    Comment: socially     Social History     Tobacco Use   Smoking Status Never Smoker   Smokeless Tobacco Never Used     Social History     Substance and Sexual Activity   Drug Use Never       Current Outpatient Medications:     amLODIPine (NORVASC) 5 MG tablet, Take 1 tablet (5 mg total) by mouth once daily., Disp: 90 tablet, Rfl: 1    cetirizine (ZYRTEC) 10 MG tablet, Take 1 tablet (10 mg total) by mouth every evening., Disp: 30 tablet, Rfl: 0    colchicine (COLCRYS) 0.6 mg tablet, Take 1 tablet (0.6 mg total) by mouth daily as needed. For gout flares: Day 1: Oral: 1.2 mg, followed in 1 hour with a single dose of 0.6 mg. Day 2 and thereafter: Oral: 0.6 mg daily until flare resolves, Disp: 30 tablet, Rfl: 2    diclofenac sodium (VOLTAREN) 1 % Gel, Apply 2 grams topically once daily., Disp: 1 Tube, Rfl: 2    dicyclomine (BENTYL) 20 mg tablet, Take 1 tablet (20 mg total) by mouth 3 (three) times daily as needed (abdominal pain)., Disp: 90 tablet, Rfl: 0    famotidine (PEPCID) 20 MG tablet, Take 1 tablet (20 mg total) by mouth 2 (two) times daily as needed for Heartburn., Disp: 60 tablet, Rfl: 0    febuxostat (ULORIC) 40 mg Tab, Take 1 tablet (40 mg total) by mouth once daily., Disp: 90 tablet, Rfl: 3    finasteride (PROSCAR) 5 mg tablet, Take 1 tablet (5 mg total) by mouth once daily., Disp: 90 tablet, Rfl: 0    losartan (COZAAR) 50 MG tablet, Take 1 tablet (50 mg total) by mouth once daily., Disp: 90 tablet, Rfl: 3    meloxicam (MOBIC) 15 MG tablet, Take 1 tablet (15 mg total) by mouth once daily. For pain and  "inflammation., Disp: 30 tablet, Rfl: 1    methocarbamoL (ROBAXIN) 750 MG Tab, Take 1 tablet once or twice a day as needed for muscle spasm., Disp: 30 tablet, Rfl: 1    MULTIVITAMIN ORAL, Take 1 tablet by mouth once daily., Disp: , Rfl:     prazosin (MINIPRESS) 2 MG Cap, Take 1 capsule (2 mg total) by mouth every evening. (Patient taking differently: Take 2 mg by mouth as needed. ), Disp: 90 capsule, Rfl: 3    rizatriptan (MAXALT-MLT) 10 MG disintegrating tablet, Take 1 tablet (10 mg total) by mouth daily as needed for Migraine. May repeat in 2 hours if needed, Disp: 30 tablet, Rfl: 0    sertraline (ZOLOFT) 100 MG tablet, Take 1 tablet (100 mg total) by mouth once daily., Disp: 90 tablet, Rfl: 3    tadalafiL (CIALIS) 20 MG Tab, Take 1/2 to 1 tablet by mouth once daily as needed for sexual activity., Disp: 15 tablet, Rfl: 0    topiramate (TOPAMAX) 25 MG tablet, Take one tablet at night for first week, then take 1 tablet (25 mg total) by mouth 2 (two) times daily., Disp: 180 tablet, Rfl: 0    traZODone (DESYREL) 150 MG tablet, Take 1 tablet (150 mg total) by mouth every evening., Disp: 90 tablet, Rfl: 2    valACYclovir (VALTREX) 500 MG tablet, Take 1 tablet (500 mg total) by mouth 2 (two) times daily as needed., Disp: 30 tablet, Rfl: 0     Objective:         Vitals:    11/17/20 0933   BP: 134/88     Physical Exam   Constitutional: No distress.   Estimated body mass index is 33.29 kg/m² as calculated from the following:    Height as of this encounter: 5' 7" (1.702 m).    Weight as of this encounter: 96.4 kg (212 lb 8.4 oz).    Wt Readings from Last 1 Encounters:  11/17/20 0933 : 96.4 kg (212 lb 8.4 oz)     HENT:   Head: Normocephalic and atraumatic.   Eyes: Conjunctivae are normal. Pupils are equal, round, and reactive to light.   Neck: Normal range of motion.   Cardiovascular: Normal rate and intact distal pulses.    Pulmonary/Chest: Effort normal. No respiratory distress.   Abdominal: Soft. He exhibits no " distension.   Neurological: He is alert. Gait normal.   Skin: No rash noted. No erythema.     Musculoskeletal: Normal range of motion. No tenderness.      Comments: : strong  No synovitis or significant squeeze tenderness    AROM: intact  PROM: intact    Devices used by patient: none       Reviewed old and all outside pertinent medical records available.    All lab results personally reviewed and interpreted by me.  Lab Results   Component Value Date    WBC 10.23 05/06/2019    HGB 14.2 05/06/2019    HCT 39.6 (L) 05/06/2019    MCV 88 05/06/2019    MCH 31.7 (H) 05/06/2019    MCHC 35.9 05/06/2019    RDW 13.8 05/06/2019     05/06/2019    MPV 11.0 05/06/2019       Lab Results   Component Value Date     03/13/2020    K 4.2 03/13/2020     03/13/2020    CO2 26 03/13/2020    GLU 82 03/13/2020    BUN 16 03/13/2020    CALCIUM 9.3 03/13/2020    PROT 7.8 03/13/2020    ALBUMIN 3.7 03/13/2020    BILITOT 0.8 03/13/2020    AST 29 03/13/2020    ALKPHOS 73 03/13/2020    ALT 28 03/13/2020       Lab Results   Component Value Date    COLORU Yellow 02/04/2020    APPEARANCEUA Clear 02/04/2020    SPECGRAV 1.020 02/04/2020    PHUR 6.0 02/04/2020    PROTEINUA 2+ (A) 02/04/2020    KETONESU Negative 02/04/2020    LEUKOCYTESUR Negative 02/04/2020    NITRITE Negative 02/04/2020       Lab Results   Component Value Date    CRP 3.4 10/18/2019       Lab Results   Component Value Date    SEDRATE <2 10/18/2019       Lab Results   Component Value Date    SEDRATE <2 10/18/2019       No components found for: 25OHVITDTOT, 50WHVFTD7, 55JFXFMP8, METHODNOTE    Lab Results   Component Value Date    URICACID 7.8 (H) 03/13/2020     No components found for: TSPOTTB    · CK: 830 (5/2019) -> 543 (8/2019)-> 707 (10/2019)-> 402  · Aldolase within reference value    Rheum Labs:   MARINA negative   RNP antibody negative   Myomarker panel 3 negative     Infectious Labs:   HIV NR     Imaging:  All imaging reviewed and independently  interpreted by  me.    X-ray foot December 2019   no radiographic evidence of acute osseous, articular, or soft tissue abnormality.  Joint spaces are preserved.    X-ray knees March 2020   Mild degenerative change throughout the remainder of the knee.    X-ray hip October 2020  No evidence of femoral head avascular necrosis.  Hip joint spaces are normal.  Sacroiliac joints are normal.  Multiple pelvic phleboliths are present     ASSESSMENT / PLAN:     Elder Hernandez is a 45 y.o. Black or  male with:    1. Chronic gout  - was previously recommended not to increase allopurinol past 150 mg by Nephrology.  - has being on Uloric 40 mg without side effects.  Denies gout flare since last visit.  - taking colchicine p.r.n. Will provide refills.  - pending repeat labs.  Will continue current dose of Uloric unless patient was gout flare or significant increase in uric acid and decline kidney function  - continue dietary and lifestyle modifications    2. Right medial epicondylitis - resolved  - brace p.r.n. recommended  - stretching, massage, heat/ice  - Acetaminophen prn up to 3 g per day  - Topicals therapy: Capsaicin / NSAIDs     2. HyperCKemia  - no features suggestive of active inflammatory myopathy  - clinical features of inflammatory myositis previously discussed from when to come to clinic sooner    3. Other specified counseling  - over 10 minutes spent regarding below topics:  - Nutrition and exercise counseling.  - Medication counseling provided.  - avoid nephrotoxic medications.  - blood pressure management per PMD and Nephrology    Follow up in about 8 months (around 7/17/2021).    Method of contact with patient concerns: [facility]    Disclaimer:  This note is prepared using voice recognition software and as such is likely to have errors and has not been proof read. Please contact me for questions.     Mannie Holland M.D.  Rheumatology Department   [facility]

## 2020-11-19 ENCOUNTER — CLINICAL SUPPORT (OUTPATIENT)
Dept: REHABILITATION | Facility: HOSPITAL | Age: 45
End: 2020-11-19
Payer: OTHER GOVERNMENT

## 2020-11-19 DIAGNOSIS — M54.50 ACUTE RIGHT-SIDED LOW BACK PAIN WITHOUT SCIATICA: ICD-10-CM

## 2020-11-19 PROCEDURE — 97140 MANUAL THERAPY 1/> REGIONS: CPT | Mod: CQ

## 2020-11-19 PROCEDURE — 97110 THERAPEUTIC EXERCISES: CPT | Mod: CQ

## 2020-11-19 RX ORDER — COLCHICINE 0.6 MG/1
0.6 TABLET ORAL DAILY
Qty: 30 TABLET | Refills: 2 | Status: SHIPPED | OUTPATIENT
Start: 2020-11-19 | End: 2021-04-30

## 2020-11-19 RX ORDER — FEBUXOSTAT 40 MG/1
80 TABLET, FILM COATED ORAL DAILY
Qty: 180 TABLET | Refills: 2 | Status: SHIPPED | OUTPATIENT
Start: 2020-11-19 | End: 2021-02-17

## 2020-11-19 NOTE — PROGRESS NOTES
High alert, BP remains uncontrolled.   Patient unavailable to talk now, states he will call me back.     Per chart review, dealing with back pain. Will discuss correlation to BP and question NSAID use with renal fxn and HBP.     BP was near goal at recent clinic visit.   Is cuff charged? Medication compliance??    Last 5 Patient Entered Readings                                      Current 30 Day Average: 144/100     Recent Readings 11/19/2020 11/19/2020 11/18/2020 11/17/2020 11/17/2020    SBP (mmHg) 153 143 151 151 157    DBP (mmHg) 112 107 100 110 106    Pulse 91 87 74 60 70        Hypertension Medications             amLODIPine (NORVASC) 5 MG tablet Take 1 tablet (5 mg total) by mouth once daily.    losartan (COZAAR) 50 MG tablet Take 1 tablet (50 mg total) by mouth once daily.    prazosin (MINIPRESS) 2 MG Cap Take 1 capsule (2 mg total) by mouth every evening.

## 2020-11-19 NOTE — PROGRESS NOTES
"  Physical Therapy Daily Treatment Note     Name: Elder Hernandez  Clinic Number: [id number]    Therapy Diagnosis:   Encounter Diagnosis   Name Primary?    Acute right-sided low back pain without sciatica      Physician: Torito Damon MD    Visit Date: 11/19/2020    Physician Orders: PT Eval and Treat   Medical Diagnosis from Referral: M54.5,G89.29 (ICD-10-CM) - Chronic low back pain, unspecified back pain laterality, unspecified whether sciatica present  Evaluation Date: 11/12/2020  Authorization Period Expiration: 10/10/2021  Plan of Care Expiration: 12/24/2020  Visit # / Visits authorized: 3/ 9  FOTO: 3/10        Time In: 748  Time Out: 832  Total Billable Time: 44 minutes     Precautions: Standard    Subjective     Pt reports: he is not really hurting today, he is just sore. He really liked the needling and has noticed his back is looser.   He was compliant with home exercise program.  Response to previous treatment: No significant changes  Functional change: None yet    Pain: 3/10  Location: right back      Objective     Elder received the following manual therapy techniques: Myofacial release and Soft tissue Mobilization were applied to the: right lumbar spine for 15 minutes, including:  AISTM and STM to right side of lumbar spine  FDN to right lumbar multifidi and QL with stim deferred    Elder received therapeutic exercises to develop strength, endurance and core stabilization for 30 minutes including:  Hand heel rocks (mid back stretch) 20" x 5 with right sided bias  Prone planks with slides - 15x each arm  Side planks with hip abduction 3x10  Standing punch outs 25x each way - 4 plates  Seated lumbar rotations at pulled 20# [id number] bilateral    Home Exercises Provided and Patient Education Provided     Education provided:   - Home modalities prn  - continue activity at home, change sleeping position if possible, lumbar support while driving.     Assessment     Patient tolerated treatment " well today with no complaints of discomfort, good fatigue noted throughout session. Strength progressions made and new activities added and patient responded well. Patient was very challenged with all core activities, noted decreased endurance. Patient left treatment with reports of increased soreness and decreased tightness in his back.   Elder is progressing well towards his goals.   Pt prognosis is Excellent.     Pt will continue to benefit from skilled outpatient physical therapy to address the deficits listed in the problem list box on initial evaluation, provide pt/family education and to maximize pt's level of independence in the home and community environment.     Pt's spiritual, cultural and educational needs considered and pt agreeable to plan of care and goals.    Anticipated barriers to physical therapy: None    Goals:   Long Term Goals (6 Weeks):   1. Pt will be compliant with HEP to supplement PT in decreasing pain with functional mobility.  2. Pt will improve FOTO score to </= 30% limited to decrease perceived limitation with maintaining/changing body position.   3. Pt will perform proper prone planks for over 30 seconds with good control to demonstrate improved core strength.  4. Pt will improve impaired LE myotomes/MMTs to >/=5/5 to improve strength for functional tasks.  5. Pt will report no pain during sleeping promote improved low back pain.    Plan     Plan of care Certification: 11/12/2020 to 12/24/2020.     Outpatient Physical Therapy 2 times weekly for 6 weeks to include the following interventions: Electrical Stimulation IFC, Gait Training, Manual Therapy, Moist Heat/ Ice, Neuromuscular Re-ed, Patient Education, Self Care, Therapeutic Activites and Therapeutic Exercise, ASTYM, Kinesiotaping PRN, Functional Dry Needling    Dalia Mar, PTA

## 2020-11-23 ENCOUNTER — PATIENT MESSAGE (OUTPATIENT)
Dept: RHEUMATOLOGY | Facility: CLINIC | Age: 45
End: 2020-11-23

## 2020-12-01 ENCOUNTER — CLINICAL SUPPORT (OUTPATIENT)
Dept: REHABILITATION | Facility: HOSPITAL | Age: 45
End: 2020-12-01
Payer: OTHER GOVERNMENT

## 2020-12-01 DIAGNOSIS — M54.50 ACUTE RIGHT-SIDED LOW BACK PAIN WITHOUT SCIATICA: ICD-10-CM

## 2020-12-01 PROCEDURE — 97140 MANUAL THERAPY 1/> REGIONS: CPT | Mod: CQ

## 2020-12-01 PROCEDURE — 97110 THERAPEUTIC EXERCISES: CPT | Mod: CQ

## 2020-12-01 NOTE — PROGRESS NOTES
"  Physical Therapy Daily Treatment Note     Name: Elder Hernandez  Clinic Number: [id number]    Therapy Diagnosis:   Encounter Diagnosis   Name Primary?    Acute right-sided low back pain without sciatica      Physician: Torito Damon MD    Visit Date: 12/1/2020    Physician Orders: PT Eval and Treat   Medical Diagnosis from Referral: M54.5,G89.29 (ICD-10-CM) - Chronic low back pain, unspecified back pain laterality, unspecified whether sciatica present  Evaluation Date: 11/12/2020  Authorization Period Expiration: 10/10/2021  Plan of Care Expiration: 12/24/2020  Visit # / Visits authorized: 4/ 9  FOTO: 4/10        Time In: 7:00am  Time Out: 7:43am  Total Billable Time: 43 minutes     Precautions: Standard    Subjective     Pt reports: he did not really work out last week because he was out of town. He felt good after last treatment but he is hurting a little bit today.   He was compliant with home exercise program.  Response to previous treatment: No significant changes  Functional change: None yet    Pain: 7/10  Location: right back      Objective     Elder received the following manual therapy techniques: Myofacial release and Soft tissue Mobilization were applied to the: right lumbar spine for 15 minutes, including:  AISTM and STM to right side of lumbar spine  FDN to right lumbar multifidi and QL with stim deferred    Elder received therapeutic exercises to develop strength, endurance and core stabilization for 30 minutes including:  Ball roll outs 10s 10x  Hand heel rocks (mid back stretch) 20" x 5 with right sided bias  Prone planks with slides - 15x each arm  Side planks with hip abduction 3x8  Standing punch outs 25x each way - 6 plates bilateral  Seated lumbar rotations at pulley 6 plates 3x10 bilateral    Home Exercises Provided and Patient Education Provided     Education provided:   - Home modalities prn  - continue activity at home, change sleeping position if possible, lumbar " support while driving.     Assessment     Patient tolerated treatment well today with no complaints of discomfort. Patient still very challenged by side planks, although patient had better form today on both sides compared to previous visit. Noted slight tightness and tenderness in right mid to low back today which decreased with manual therapy. Patient did exhibit some increased core strength and endurance today displayed by strength progressions made.   Eldre is progressing well towards his goals.   Pt prognosis is Excellent.     Pt will continue to benefit from skilled outpatient physical therapy to address the deficits listed in the problem list box on initial evaluation, provide pt/family education and to maximize pt's level of independence in the home and community environment.     Pt's spiritual, cultural and educational needs considered and pt agreeable to plan of care and goals.    Anticipated barriers to physical therapy: None    Goals:   Long Term Goals (6 Weeks):   1. Pt will be compliant with HEP to supplement PT in decreasing pain with functional mobility.  2. Pt will improve FOTO score to </= 30% limited to decrease perceived limitation with maintaining/changing body position.   3. Pt will perform proper prone planks for over 30 seconds with good control to demonstrate improved core strength.  4. Pt will improve impaired LE myotomes/MMTs to >/=5/5 to improve strength for functional tasks.  5. Pt will report no pain during sleeping promote improved low back pain.    Plan     Plan of care Certification: 11/12/2020 to 12/24/2020.     Outpatient Physical Therapy 2 times weekly for 6 weeks to include the following interventions: Electrical Stimulation IFC, Gait Training, Manual Therapy, Moist Heat/ Ice, Neuromuscular Re-ed, Patient Education, Self Care, Therapeutic Activites and Therapeutic Exercise, ASTYM, Kinesiotaping PRN, Functional Dry Needling    Dalia Mar, MAN

## 2020-12-02 ENCOUNTER — OFFICE VISIT (OUTPATIENT)
Dept: INTERNAL MEDICINE | Facility: CLINIC | Age: 45
End: 2020-12-02
Payer: OTHER GOVERNMENT

## 2020-12-02 VITALS
DIASTOLIC BLOOD PRESSURE: 100 MMHG | HEART RATE: 62 BPM | SYSTOLIC BLOOD PRESSURE: 150 MMHG | BODY MASS INDEX: 33.84 KG/M2 | HEIGHT: 67 IN | WEIGHT: 215.63 LBS | TEMPERATURE: 98 F

## 2020-12-02 DIAGNOSIS — I10 ESSENTIAL HYPERTENSION: ICD-10-CM

## 2020-12-02 DIAGNOSIS — R51.9 INTRACTABLE HEADACHE, UNSPECIFIED CHRONICITY PATTERN, UNSPECIFIED HEADACHE TYPE: Primary | ICD-10-CM

## 2020-12-02 PROCEDURE — 99213 OFFICE O/P EST LOW 20 MIN: CPT | Mod: PBBFAC,PO | Performed by: NURSE PRACTITIONER

## 2020-12-02 PROCEDURE — 99999 PR PBB SHADOW E&M-EST. PATIENT-LVL III: ICD-10-PCS | Mod: PBBFAC,,, | Performed by: NURSE PRACTITIONER

## 2020-12-02 PROCEDURE — 99213 PR OFFICE/OUTPT VISIT, EST, LEVL III, 20-29 MIN: ICD-10-PCS | Mod: S$PBB,,, | Performed by: NURSE PRACTITIONER

## 2020-12-02 PROCEDURE — 99999 PR PBB SHADOW E&M-EST. PATIENT-LVL III: CPT | Mod: PBBFAC,,, | Performed by: NURSE PRACTITIONER

## 2020-12-02 PROCEDURE — 99213 OFFICE O/P EST LOW 20 MIN: CPT | Mod: S$PBB,,, | Performed by: NURSE PRACTITIONER

## 2020-12-02 NOTE — PROGRESS NOTES
Digital Medicine: Clinician Follow-Up    Continues to high alert.   Patient endorses seldom headache and feeling of pressure behind his eyes at times.   He states he considered making an appointment with his PCP.     Has not charged his cuff recently, unsure of when last charge was. Will charge overnight tonight.     Working on improving hydration.     Denies increased stress, anxiety, sodium, or NSAID use.     Started taking two supplements, unable to provide specifics on either, but states one is a testosterone booster and one is nitric oxide. Asked he supply pictures of the labels.       The history is provided by the patient.      Review of patient's allergies indicates:  No Known Allergies  Follow-up reason(s): Alert received.   Care Team received high BP alert.      Hypertension    Readings are trending up due to new supplements, charge of cuff??.    Patient is not experiencing signs/symptoms of hypotension.  Patient is experiencing signs/symptoms of hypertension. HA, pain behind eyes.             Last 5 Patient Entered Readings                                      Current 30 Day Average: 150/104     Recent Readings 12/2/2020 12/2/2020 12/1/2020 11/29/2020 11/28/2020    SBP (mmHg) 143 137 176 173 154    DBP (mmHg) 105 107 112 108 108    Pulse 81 80 69 56 72                 Depression Screening  Did not address depression screening.    Sleep Apnea Screening  Patient not previously diagnosed with LALITO       Medication Affordability Screening  Patient did not answer the medication affordability questionnaires. Patient is currently not having problems affording medications    Medication Adherence-Medication adherence was assessed.        Patient states compliance when asked, compliance report shows non compliance with all BP medications.       ASSESSMENT(S)  Patients BP average is 150/104 mmHg, which is above goal. Patient's BP goal is less than or equal to 130/80.     Supplements contributing to BP rise. Patient  advised to send in picture of labels for further discussion and recommendations.     Charge cuff. Bring to PCP appointment for direct comparison.     Resting readings - technique/timing.       Hypertension Plan  Continue current therapy.  Instructed to charge device.  Patient to obtain readings after charging cuff to ensure accuracy of cuff at home. He will schedule PCP visit.     Compliance of prescribed medications and influence of supplements are causing elevations at this time. Considering renal fxn, plan to increase amlodipine once patient can remain compliant on current dose.      Addressed patient questions and patient has my contact information if needed prior to next outreach. Patient verbalizes understanding.      Explained the importance of self-monitoring and medication adherence. Encouraged the patient to communicate with their health  for lifestyle modifications to help improve or maintain a healthy lifestyle.        Explained to the patient that the Digital Medicine team is not available for emergencies. Advised patient call [facility] ([phone] or [phone]) or [phone] if needed.            There are no preventive care reminders to display for this patient.  There are no preventive care reminders to display for this patient.      Hypertension Medications             amLODIPine (NORVASC) 5 MG tablet Take 1 tablet (5 mg total) by mouth once daily.    losartan (COZAAR) 50 MG tablet Take 1 tablet (50 mg total) by mouth once daily.    prazosin (MINIPRESS) 2 MG Cap Take 1 capsule (2 mg total) by mouth every evening.

## 2020-12-02 NOTE — PROGRESS NOTES
"Subjective:       Patient ID: Elder Hernandez is a 45 y.o. male.    Chief Complaint: Headache    Patient here today to discuss bp  Has been high at home  Currently on losartan and amlodipine and prazosin   Patient started taking supplement "JYM" with fenugreek, ashwagandha, eurycoma in it as well as a nitric oxide booster. He has been taking them bid.  Supplements were started about 3 weeks ago  Feels that pressure started going up when he started this supplement.    Headache   Pertinent negatives include no abdominal pain, coughing, dizziness, ear pain, eye pain, fever, nausea, numbness, photophobia, rhinorrhea, seizures, sinus pressure, sore throat, vomiting or weakness.       BP (!) 150/100   Pulse 62   Temp 97.6 °F (36.4 °C) (Temporal)   Ht 5' 7" (1.702 m)   Wt 97.8 kg (215 lb 9.8 oz)   BMI 33.77 kg/m²     Review of Systems   Constitutional: Positive for activity change. Negative for appetite change, chills, diaphoresis, fever and unexpected weight change.   HENT: Negative for congestion, ear pain, nosebleeds, postnasal drip, rhinorrhea, sinus pressure, sneezing, sore throat and trouble swallowing.    Eyes: Negative for photophobia, pain and visual disturbance.   Respiratory: Negative for apnea, cough, choking, chest tightness, shortness of breath and wheezing.    Cardiovascular: Negative for chest pain, palpitations and leg swelling.   Gastrointestinal: Negative for abdominal pain, blood in stool, constipation, diarrhea, nausea and vomiting.   Genitourinary: Negative for decreased urine volume, difficulty urinating, dysuria, hematuria and urgency.   Musculoskeletal: Negative for arthralgias, gait problem, joint swelling and myalgias.   Skin: Negative for rash.   Neurological: Positive for headaches. Negative for dizziness, tremors, seizures, syncope, weakness, light-headedness and numbness.   Psychiatric/Behavioral: Negative for agitation, confusion, decreased concentration, hallucinations and " sleep disturbance. The patient is not nervous/anxious.        Objective:      Physical Exam  Vitals signs and nursing note reviewed.   Constitutional:       General: He is not in acute distress.     Appearance: He is well-developed. He is not diaphoretic.   HENT:      Head: Normocephalic and atraumatic.   Eyes:      General:         Right eye: No discharge.         Left eye: No discharge.      Conjunctiva/sclera: Conjunctivae normal.   Cardiovascular:      Rate and Rhythm: Normal rate and regular rhythm.      Heart sounds: Normal heart sounds. No murmur.   Pulmonary:      Effort: Pulmonary effort is normal. No respiratory distress.      Breath sounds: Normal breath sounds. No wheezing or rales.   Chest:      Chest wall: No tenderness.   Abdominal:      General: There is no distension.      Palpations: Abdomen is soft.   Musculoskeletal: Normal range of motion.   Skin:     General: Skin is warm and dry.      Findings: No rash.   Neurological:      Mental Status: He is alert and oriented to person, place, and time.   Psychiatric:         Behavior: Behavior normal. Behavior is cooperative.         Thought Content: Thought content normal.         Judgment: Judgment normal.         Assessment:       1. Intractable headache, unspecified chronicity pattern, unspecified headache type    2. Essential hypertension        Plan:       Elder was seen today for headache.    Diagnoses and all orders for this visit:    Intractable headache, unspecified chronicity pattern, unspecified headache type    Essential hypertension    increase losartan to 2 daily for the next 2-3 days  Stop supplement  Check pressures  If still running high off supplement will need to increase losartan on a long term basis  Follow up Primary Care Physician 2 weeks for bp check

## 2020-12-03 ENCOUNTER — TELEPHONE (OUTPATIENT)
Dept: REHABILITATION | Facility: HOSPITAL | Age: 45
End: 2020-12-03

## 2020-12-14 ENCOUNTER — CLINICAL SUPPORT (OUTPATIENT)
Dept: REHABILITATION | Facility: HOSPITAL | Age: 45
End: 2020-12-14
Payer: OTHER GOVERNMENT

## 2020-12-14 DIAGNOSIS — M54.50 ACUTE RIGHT-SIDED LOW BACK PAIN WITHOUT SCIATICA: ICD-10-CM

## 2020-12-14 PROCEDURE — 97140 MANUAL THERAPY 1/> REGIONS: CPT | Performed by: PHYSICAL THERAPIST

## 2020-12-14 PROCEDURE — 97110 THERAPEUTIC EXERCISES: CPT | Performed by: PHYSICAL THERAPIST

## 2020-12-14 NOTE — PROGRESS NOTES
"  Physical Therapy Daily Treatment Note     Name: Elder Hernandez  Clinic Number: [id number]    Therapy Diagnosis:   Encounter Diagnosis   Name Primary?    Acute right-sided low back pain without sciatica      Physician: Torito Damon MD    Visit Date: 12/14/2020    Physician Orders: PT Eval and Treat   Medical Diagnosis from Referral: M54.5,G89.29 (ICD-10-CM) - Chronic low back pain, unspecified back pain laterality, unspecified whether sciatica present  Evaluation Date: 11/12/2020  Authorization Period Expiration: 10/10/2021  Plan of Care Expiration: 12/24/2020  Visit # / Visits authorized: 5/9  FOTO: 5/10        Time In: 7:35am  Time Out: 820am  Total Billable Time: 45 minutes     Precautions: Standard    Subjective     Pt reports: right sided lumbar spine stiffness, tightness in muscles. Has been slowly improving.  He was compliant with home exercise program.  Response to previous treatment: No significant changes  Functional change: None yet    Pain: 3/10  Location: right back      Objective     Elder received the following manual therapy techniques: Myofacial release and Soft tissue Mobilization were applied to the: right lumbar spine for 25 minutes, including:  ASTYM and STM to right side of lumbar spine  Right sided UPA's to lumbar facet joints grade II/III    Elder received therapeutic exercises to develop strength, endurance and core stabilization for 20 minutes including:  Single leg bridges, 3x8  LTR with overpressure from opposite leg. 10" x 10  Prone planks with slides - 15x each arm  Side planks with hip abduction 3x8  Standing punch outs 25x each way - 6 plates bilateral  Seated lumbar rotations at pulley 6 plates 3x10 bilateral      Pain:     Current 2/10, worst 4/10, best 0/10   Location: right back   Description: Aching, Dull and Tight  Aggravating Factors: Sitting, Bending, Night Time, Lifting and Getting out of bed/chair       Range of Motion/Strength:      Thoracolumbar AROM " Pain/Dysfunction with Movement   Flexion 75 Pulling on right more than left   Extension 25    Right side bending 25     Left side bending 25           Hip Right Left Pain/Dysfunction with Movement   AROM/PROM WNL WNL       Knee Right Left Pain/Dysfunction with Movement   AROM/PROM WNL WNL           L/E MMT Right Left Pain/Dysfunction with Movement   Hip Flexion 4+/5 5/5     Hip Extension 5/5 5/5     Hip Abduction 4/5 4/5     Hip Adduction 5/5 5/5     Knee Flexion 5/5 5/5     Knee Extension 5/5 5/5     Ankle DF 5/5 5/5     Ankle PF 5/5 5/5          CMS Impairment/Limitation/Restriction for FOTO lumbar spine Survey     Therapist reviewed FOTO scores for Elder Hernandez on 12/14/2020.   FOTO documents entered into [employer] - see Media section.     Limitation Score: 26%         Home Exercises Provided and Patient Education Provided     Education provided:   - Home modalities prn  - continue activity at home, change sleeping position if possible, lumbar support while driving.     Assessment     Significant right sided paraspinal tightness along lumbar spine. Pt does not report any symptoms that run down RLE. Pt presents with difficulty with stability of lumbar spine with planks and side planks. Pt educated on discontinuing heavy trunk flexion exercise in favor of more stabilizing therex such as planks with home workouts.     Elder is progressing well towards his goals.   Pt prognosis is Excellent.     Pt will continue to benefit from skilled outpatient physical therapy to address the deficits listed in the problem list box on initial evaluation, provide pt/family education and to maximize pt's level of independence in the home and community environment.     Pt's spiritual, cultural and educational needs considered and pt agreeable to plan of care and goals.    Anticipated barriers to physical therapy: None    Goals:   Long Term Goals (6 Weeks):   1. Pt will be compliant with HEP to supplement PT in decreasing  pain with functional mobility. MET  2. Pt will improve FOTO score to </= 30% limited to decrease perceived limitation with maintaining/changing body position. MET  3. Pt will perform proper prone planks for over 30 seconds with good control to demonstrate improved core strength. Progressing, not met  4. Pt will improve impaired LE myotomes/MMTs to >/=5/5 to improve strength for functional tasks. Progressing, not met  5. Pt will report no pain during sleeping promote improved low back pain. Progressing, not met    Plan     Plan of care Certification: 11/12/2020 to 12/24/2020.     Outpatient Physical Therapy 2 times weekly for 6 weeks to include the following interventions: Electrical Stimulation IFC, Gait Training, Manual Therapy, Moist Heat/ Ice, Neuromuscular Re-ed, Patient Education, Self Care, Therapeutic Activites and Therapeutic Exercise, ASTYM, Kinesiotaping PRN, Functional Dry Needling    David Pickett, PT

## 2020-12-16 ENCOUNTER — CLINICAL SUPPORT (OUTPATIENT)
Dept: REHABILITATION | Facility: HOSPITAL | Age: 45
End: 2020-12-16
Payer: OTHER GOVERNMENT

## 2020-12-16 DIAGNOSIS — M54.50 ACUTE RIGHT-SIDED LOW BACK PAIN WITHOUT SCIATICA: ICD-10-CM

## 2020-12-16 PROCEDURE — 97110 THERAPEUTIC EXERCISES: CPT | Performed by: PHYSICAL THERAPIST

## 2020-12-16 PROCEDURE — 97140 MANUAL THERAPY 1/> REGIONS: CPT | Performed by: PHYSICAL THERAPIST

## 2020-12-16 NOTE — PROGRESS NOTES
"  Physical Therapy Daily Treatment Note     Name: Elder Hernandez  Clinic Number: [id number]    Therapy Diagnosis:   Encounter Diagnosis   Name Primary?    Acute right-sided low back pain without sciatica      Physician: Torito Damon MD    Visit Date: 12/16/2020    Physician Orders: PT Eval and Treat   Medical Diagnosis from Referral: M54.5,G89.29 (ICD-10-CM) - Chronic low back pain, unspecified back pain laterality, unspecified whether sciatica present  Evaluation Date: 11/12/2020  Authorization Period Expiration: 10/10/2021  Plan of Care Expiration: 12/24/2020  Visit # / Visits authorized: 6/9  FOTO: 6/10        Time In: 7:45 am  Time Out: 830 am  Total Billable Time: 45 minutes     Precautions: Standard    Subjective     Pt reports: right sided lumbar stiffness continues. Minimal improvement in low back, feels more stiff today due to cold weather.     He was compliant with home exercise program.  Response to previous treatment: No significant changes  Functional change: None yet    Pain: 3/10  Location: right back      Objective     Elder received the following manual therapy techniques: Myofacial release and Soft tissue Mobilization were applied to the: right lumbar spine for 25 minutes, including:  ASTYM and STM to right side of lumbar spine  Right sided UPA's to lumbar facet joints grade II/III    Elder received therapeutic exercises to develop strength, endurance and core stabilization for 20 minutes including:  Single leg bridges, 3x8  LTR with overpressure from opposite leg. 10" x 10  Prone planks with slides - 15x each arm HELD  Side planks 30" x 3 bilateral  Standing punch outs 25x each way - 6 plates bilateral  Chops and lifts at pulley machine: 3 plates. 2x8 each way  Split stands rows 4 plates 3x12      Home Exercises Provided and Patient Education Provided     Education provided:   - Home modalities prn  - continue activity at home, change sleeping position if possible, lumbar " support while driving.     Assessment     Right sided lumbar stiffness noted. Increased thoracic mobility and continue lumbar stability program. Pt presents with fatigue following session. Will continue to work on thoracic mobility at home.      Elder is progressing well towards his goals.   Pt prognosis is Excellent.     Pt will continue to benefit from skilled outpatient physical therapy to address the deficits listed in the problem list box on initial evaluation, provide pt/family education and to maximize pt's level of independence in the home and community environment.     Pt's spiritual, cultural and educational needs considered and pt agreeable to plan of care and goals.    Anticipated barriers to physical therapy: None    Goals:   Long Term Goals (6 Weeks):   1. Pt will be compliant with HEP to supplement PT in decreasing pain with functional mobility. MET  2. Pt will improve FOTO score to </= 30% limited to decrease perceived limitation with maintaining/changing body position. MET  3. Pt will perform proper prone planks for over 30 seconds with good control to demonstrate improved core strength. Progressing, not met  4. Pt will improve impaired LE myotomes/MMTs to >/=5/5 to improve strength for functional tasks. Progressing, not met  5. Pt will report no pain during sleeping promote improved low back pain. Progressing, not met    Plan     Plan of care Certification: 11/12/2020 to 12/24/2020.     Outpatient Physical Therapy 2 times weekly for 6 weeks to include the following interventions: Electrical Stimulation IFC, Gait Training, Manual Therapy, Moist Heat/ Ice, Neuromuscular Re-ed, Patient Education, Self Care, Therapeutic Activites and Therapeutic Exercise, ASTYM, Kinesiotaping PRN, Functional Dry Needling    David Pickett, PT

## 2020-12-24 ENCOUNTER — CLINICAL SUPPORT (OUTPATIENT)
Dept: REHABILITATION | Facility: HOSPITAL | Age: 45
End: 2020-12-24
Payer: OTHER GOVERNMENT

## 2020-12-24 DIAGNOSIS — M54.50 ACUTE RIGHT-SIDED LOW BACK PAIN WITHOUT SCIATICA: ICD-10-CM

## 2020-12-24 PROCEDURE — 97140 MANUAL THERAPY 1/> REGIONS: CPT | Mod: CQ

## 2020-12-24 PROCEDURE — 97110 THERAPEUTIC EXERCISES: CPT | Mod: CQ

## 2020-12-24 NOTE — PROGRESS NOTES
"  Physical Therapy Daily Treatment Note     Name: Elder Hernandez  Clinic Number: [id number]    Therapy Diagnosis:   Encounter Diagnosis   Name Primary?    Acute right-sided low back pain without sciatica      Physician: Torito Damon MD    Visit Date: 12/24/2020    Physician Orders: PT Eval and Treat   Medical Diagnosis from Referral: M54.5,G89.29 (ICD-10-CM) - Chronic low back pain, unspecified back pain laterality, unspecified whether sciatica present  Evaluation Date: 11/12/2020  Authorization Period Expiration: 10/10/2021  Plan of Care Expiration: 12/24/2020  Visit # / Visits authorized: 7/9  FOTO: 7/10        Time In: 7:05 am  Time Out: 7:50 am  Total Billable Time: 45 minutes     Precautions: Standard    Subjective     Pt reports: he is not currently having any pain today but he is sore. He did not workout this morning.     He was compliant with home exercise program.  Response to previous treatment: No significant changes  Functional change: None yet    Pain: 3/10  Location: right back      Objective     Elder received the following manual therapy techniques: Myofacial release and Soft tissue Mobilization were applied to the: right lumbar spine for 20 minutes, including:  ASTYM and STM to right side of lumbar spine  Right sided UPA's to lumbar facet joints grade II/III deferred    Elder received therapeutic exercises to develop strength, endurance and core stabilization for 25 minutes including:  Upright Bike for Endurance level 7 5 minutes  Single leg bridges, 3x10  LTR with overpressure from opposite leg. 10" x 10  Prone planks with slides - 15x each arm HELD  Side planks 30" x 3 bilateral  Standing punch outs 25x each way - 6 plates bilateral  Chops and lifts at pulley machine: 3 plates. 2x10 each way  Split stands rows 4 plates 3x12 held      Home Exercises Provided and Patient Education Provided     Education provided:   - Home modalities prn  - continue activity at home, change " sleeping position if possible, lumbar support while driving.     Assessment     Right sided lumbar stiffness still noted pre manual therapy. Tightness and tenderness improved with STM. Patient needed verbal cues for decreased compensation for proper performance during chops and lifts activity. Patient left treatment with increased soreness. Patient wants to continue therapy and plans to schedule for more visits.   Elder is progressing well towards his goals.   Pt prognosis is Excellent.     Pt will continue to benefit from skilled outpatient physical therapy to address the deficits listed in the problem list box on initial evaluation, provide pt/family education and to maximize pt's level of independence in the home and community environment.     Pt's spiritual, cultural and educational needs considered and pt agreeable to plan of care and goals.    Anticipated barriers to physical therapy: None    Goals:   Long Term Goals (6 Weeks):   1. Pt will be compliant with HEP to supplement PT in decreasing pain with functional mobility. MET  2. Pt will improve FOTO score to </= 30% limited to decrease perceived limitation with maintaining/changing body position. MET  3. Pt will perform proper prone planks for over 30 seconds with good control to demonstrate improved core strength. Progressing, not met  4. Pt will improve impaired LE myotomes/MMTs to >/=5/5 to improve strength for functional tasks. Progressing, not met  5. Pt will report no pain during sleeping promote improved low back pain. Progressing, not met    Plan     Plan of care Certification: 11/12/2020 to 12/24/2020.     Outpatient Physical Therapy 2 times weekly for 6 weeks to include the following interventions: Electrical Stimulation IFC, Gait Training, Manual Therapy, Moist Heat/ Ice, Neuromuscular Re-ed, Patient Education, Self Care, Therapeutic Activites and Therapeutic Exercise, ASTYM, Kinesiotaping PRN, Functional Dry Needling    Dalia Mar, PTA

## 2021-01-05 ENCOUNTER — CLINICAL SUPPORT (OUTPATIENT)
Dept: REHABILITATION | Facility: HOSPITAL | Age: 46
End: 2021-01-05
Payer: OTHER GOVERNMENT

## 2021-01-05 ENCOUNTER — PATIENT MESSAGE (OUTPATIENT)
Dept: PODIATRY | Facility: CLINIC | Age: 46
End: 2021-01-05

## 2021-01-05 DIAGNOSIS — M54.50 ACUTE RIGHT-SIDED LOW BACK PAIN WITHOUT SCIATICA: ICD-10-CM

## 2021-01-05 PROCEDURE — 97110 THERAPEUTIC EXERCISES: CPT | Mod: CQ

## 2021-01-05 PROCEDURE — 97140 MANUAL THERAPY 1/> REGIONS: CPT | Mod: CQ

## 2021-01-06 ENCOUNTER — TELEPHONE (OUTPATIENT)
Dept: NEPHROLOGY | Facility: CLINIC | Age: 46
End: 2021-01-06

## 2021-01-06 ENCOUNTER — TELEPHONE (OUTPATIENT)
Dept: ORTHOPEDICS | Facility: CLINIC | Age: 46
End: 2021-01-06

## 2021-01-10 ENCOUNTER — PATIENT OUTREACH (OUTPATIENT)
Dept: ADMINISTRATIVE | Facility: OTHER | Age: 46
End: 2021-01-10

## 2021-01-11 ENCOUNTER — HOSPITAL ENCOUNTER (OUTPATIENT)
Dept: RADIOLOGY | Facility: HOSPITAL | Age: 46
Discharge: HOME | End: 2021-01-11
Attending: FAMILY MEDICINE
Payer: OTHER GOVERNMENT

## 2021-01-11 ENCOUNTER — OFFICE VISIT (OUTPATIENT)
Dept: ORTHOPEDICS | Facility: CLINIC | Age: 46
End: 2021-01-11
Payer: OTHER GOVERNMENT

## 2021-01-11 VITALS
SYSTOLIC BLOOD PRESSURE: 154 MMHG | BODY MASS INDEX: 33.84 KG/M2 | HEART RATE: 56 BPM | WEIGHT: 215.63 LBS | HEIGHT: 67 IN | DIASTOLIC BLOOD PRESSURE: 95 MMHG

## 2021-01-11 DIAGNOSIS — M25.521 RIGHT ELBOW PAIN: ICD-10-CM

## 2021-01-11 DIAGNOSIS — M54.50 ACUTE RIGHT-SIDED LOW BACK PAIN WITHOUT SCIATICA: ICD-10-CM

## 2021-01-11 DIAGNOSIS — M25.561 RIGHT KNEE PAIN, UNSPECIFIED CHRONICITY: ICD-10-CM

## 2021-01-11 DIAGNOSIS — M17.0 ARTHRITIS OF BOTH KNEES: Primary | ICD-10-CM

## 2021-01-11 DIAGNOSIS — M25.511 RIGHT SHOULDER PAIN, UNSPECIFIED CHRONICITY: Primary | ICD-10-CM

## 2021-01-11 DIAGNOSIS — M25.511 RIGHT SHOULDER PAIN, UNSPECIFIED CHRONICITY: ICD-10-CM

## 2021-01-11 PROCEDURE — 73030 X-RAY EXAM OF SHOULDER: CPT | Mod: 26,RT,, | Performed by: RADIOLOGY

## 2021-01-11 PROCEDURE — 73030 XR SHOULDER COMPLETE 2 OR MORE VIEWS RIGHT: ICD-10-PCS | Mod: 26,RT,, | Performed by: RADIOLOGY

## 2021-01-11 PROCEDURE — 73080 X-RAY EXAM OF ELBOW: CPT | Mod: TC,50

## 2021-01-11 PROCEDURE — 73080 XR ELBOW COMPLETE 3 VIEW BILATERAL: ICD-10-PCS | Mod: 26,50,, | Performed by: RADIOLOGY

## 2021-01-11 PROCEDURE — 99215 OFFICE O/P EST HI 40 MIN: CPT | Mod: S$PBB,25,, | Performed by: FAMILY MEDICINE

## 2021-01-11 PROCEDURE — 73030 X-RAY EXAM OF SHOULDER: CPT | Mod: TC,RT

## 2021-01-11 PROCEDURE — 20610 DRAIN/INJ JOINT/BURSA W/O US: CPT | Mod: PBBFAC | Performed by: FAMILY MEDICINE

## 2021-01-11 PROCEDURE — 99999 PR PBB SHADOW E&M-EST. PATIENT-LVL IV: ICD-10-PCS | Mod: PBBFAC,,, | Performed by: FAMILY MEDICINE

## 2021-01-11 PROCEDURE — 20610 LARGE JOINT ASPIRATION/INJECTION: R KNEE: ICD-10-PCS | Mod: S$PBB,RT,, | Performed by: FAMILY MEDICINE

## 2021-01-11 PROCEDURE — 99215 PR OFFICE/OUTPT VISIT, EST, LEVL V, 40-54 MIN: ICD-10-PCS | Mod: S$PBB,25,, | Performed by: FAMILY MEDICINE

## 2021-01-11 PROCEDURE — 73080 X-RAY EXAM OF ELBOW: CPT | Mod: 26,50,, | Performed by: RADIOLOGY

## 2021-01-11 PROCEDURE — 99999 PR PBB SHADOW E&M-EST. PATIENT-LVL IV: CPT | Mod: PBBFAC,,, | Performed by: FAMILY MEDICINE

## 2021-01-11 PROCEDURE — 99214 OFFICE O/P EST MOD 30 MIN: CPT | Mod: PBBFAC,25 | Performed by: FAMILY MEDICINE

## 2021-01-11 RX ORDER — BETAMETHASONE SODIUM PHOSPHATE AND BETAMETHASONE ACETATE 3; 3 MG/ML; MG/ML
6 INJECTION, SUSPENSION INTRA-ARTICULAR; INTRALESIONAL; INTRAMUSCULAR; SOFT TISSUE
Status: DISCONTINUED | OUTPATIENT
Start: 2021-01-11 | End: 2021-01-11 | Stop reason: HOSPADM

## 2021-01-11 RX ADMIN — BETAMETHASONE ACETATE AND BETAMETHASONE SODIUM PHOSPHATE 6 MG: 3; 3 INJECTION, SUSPENSION INTRA-ARTICULAR; INTRALESIONAL; INTRAMUSCULAR; SOFT TISSUE at 08:01

## 2021-01-12 ENCOUNTER — CLINICAL SUPPORT (OUTPATIENT)
Dept: REHABILITATION | Facility: HOSPITAL | Age: 46
End: 2021-01-12
Payer: OTHER GOVERNMENT

## 2021-01-12 DIAGNOSIS — M54.50 ACUTE RIGHT-SIDED LOW BACK PAIN WITHOUT SCIATICA: ICD-10-CM

## 2021-01-12 PROCEDURE — 97110 THERAPEUTIC EXERCISES: CPT | Performed by: PHYSICAL THERAPIST

## 2021-01-12 PROCEDURE — 97140 MANUAL THERAPY 1/> REGIONS: CPT | Performed by: PHYSICAL THERAPIST

## 2021-01-13 ENCOUNTER — CLINICAL SUPPORT (OUTPATIENT)
Dept: URGENT CARE | Facility: CLINIC | Age: 46
End: 2021-01-13
Payer: OTHER GOVERNMENT

## 2021-01-13 ENCOUNTER — TELEPHONE (OUTPATIENT)
Dept: INTERNAL MEDICINE | Facility: CLINIC | Age: 46
End: 2021-01-13

## 2021-01-13 VITALS — TEMPERATURE: 98 F | OXYGEN SATURATION: 98 % | HEART RATE: 70 BPM

## 2021-01-13 DIAGNOSIS — R09.81 NASAL CONGESTION: Primary | ICD-10-CM

## 2021-01-13 LAB
CTP QC/QA: YES
SARS-COV-2 RDRP RESP QL NAA+PROBE: NEGATIVE

## 2021-01-13 PROCEDURE — 99211 PR OFFICE/OUTPT VISIT, EST, LEVL I: ICD-10-PCS | Mod: S$GLB,,, | Performed by: NURSE PRACTITIONER

## 2021-01-13 PROCEDURE — U0002 COVID-19 LAB TEST NON-CDC: HCPCS | Mod: QW,S$GLB,, | Performed by: NURSE PRACTITIONER

## 2021-01-13 PROCEDURE — 99211 OFF/OP EST MAY X REQ PHY/QHP: CPT | Mod: S$GLB,,, | Performed by: NURSE PRACTITIONER

## 2021-01-13 PROCEDURE — U0002: ICD-10-PCS | Mod: QW,S$GLB,, | Performed by: NURSE PRACTITIONER

## 2021-01-15 ENCOUNTER — OFFICE VISIT (OUTPATIENT)
Dept: INTERNAL MEDICINE | Facility: CLINIC | Age: 46
End: 2021-01-15
Payer: OTHER GOVERNMENT

## 2021-01-15 VITALS
BODY MASS INDEX: 33.42 KG/M2 | DIASTOLIC BLOOD PRESSURE: 86 MMHG | SYSTOLIC BLOOD PRESSURE: 132 MMHG | HEART RATE: 87 BPM | TEMPERATURE: 97 F | HEIGHT: 67 IN | OXYGEN SATURATION: 99 % | WEIGHT: 212.94 LBS

## 2021-01-15 DIAGNOSIS — J01.90 ACUTE SINUSITIS, RECURRENCE NOT SPECIFIED, UNSPECIFIED LOCATION: Primary | ICD-10-CM

## 2021-01-15 PROCEDURE — 99999 PR PBB SHADOW E&M-EST. PATIENT-LVL V: ICD-10-PCS | Mod: PBBFAC,,, | Performed by: PHYSICIAN ASSISTANT

## 2021-01-15 PROCEDURE — 99214 PR OFFICE/OUTPT VISIT, EST, LEVL IV, 30-39 MIN: ICD-10-PCS | Mod: 25,S$PBB,, | Performed by: PHYSICIAN ASSISTANT

## 2021-01-15 PROCEDURE — 99214 OFFICE O/P EST MOD 30 MIN: CPT | Mod: 25,S$PBB,, | Performed by: PHYSICIAN ASSISTANT

## 2021-01-15 PROCEDURE — 99215 OFFICE O/P EST HI 40 MIN: CPT | Mod: PBBFAC,25 | Performed by: PHYSICIAN ASSISTANT

## 2021-01-15 PROCEDURE — 96372 THER/PROPH/DIAG INJ SC/IM: CPT | Mod: PBBFAC

## 2021-01-15 PROCEDURE — 99999 PR PBB SHADOW E&M-EST. PATIENT-LVL V: CPT | Mod: PBBFAC,,, | Performed by: PHYSICIAN ASSISTANT

## 2021-01-15 RX ORDER — DOXYCYCLINE 100 MG/1
100 CAPSULE ORAL 2 TIMES DAILY
Qty: 20 CAPSULE | Refills: 0 | Status: SHIPPED | OUTPATIENT
Start: 2021-01-15 | End: 2021-01-25

## 2021-01-15 RX ORDER — BETAMETHASONE SODIUM PHOSPHATE AND BETAMETHASONE ACETATE 3; 3 MG/ML; MG/ML
9 INJECTION, SUSPENSION INTRA-ARTICULAR; INTRALESIONAL; INTRAMUSCULAR; SOFT TISSUE
Status: COMPLETED | OUTPATIENT
Start: 2021-01-15 | End: 2021-01-15

## 2021-01-15 RX ORDER — PROMETHAZINE HYDROCHLORIDE AND DEXTROMETHORPHAN HYDROBROMIDE 6.25; 15 MG/5ML; MG/5ML
5 SYRUP ORAL EVERY 8 HOURS PRN
Qty: 118 ML | Refills: 0 | Status: SHIPPED | OUTPATIENT
Start: 2021-01-15 | End: 2021-01-23

## 2021-01-15 RX ORDER — BENZONATATE 200 MG/1
200 CAPSULE ORAL 3 TIMES DAILY PRN
Qty: 30 CAPSULE | Refills: 0 | Status: SHIPPED | OUTPATIENT
Start: 2021-01-15 | End: 2021-01-25

## 2021-01-15 RX ADMIN — BETAMETHASONE SODIUM PHOSPHATE AND BETAMETHASONE ACETATE 9 MG: 3; 3 INJECTION, SUSPENSION INTRA-ARTICULAR; INTRALESIONAL; INTRAMUSCULAR; SOFT TISSUE at 11:01

## 2021-01-19 ENCOUNTER — LAB VISIT (OUTPATIENT)
Dept: LAB | Facility: HOSPITAL | Age: 46
End: 2021-01-19
Attending: INTERNAL MEDICINE
Payer: OTHER GOVERNMENT

## 2021-01-19 DIAGNOSIS — R74.8 HYPERCKEMIA: ICD-10-CM

## 2021-01-19 LAB
BACTERIA #/AREA URNS HPF: NORMAL /HPF
BILIRUB UR QL STRIP: NEGATIVE
CLARITY UR: CLEAR
COLOR UR: YELLOW
GLUCOSE UR QL STRIP: NEGATIVE
HGB UR QL STRIP: NEGATIVE
HYALINE CASTS #/AREA URNS LPF: 0 /LPF
KETONES UR QL STRIP: NEGATIVE
LEUKOCYTE ESTERASE UR QL STRIP: NEGATIVE
MICROSCOPIC COMMENT: NORMAL
NITRITE UR QL STRIP: NEGATIVE
PH UR STRIP: 6 [PH] (ref 5–8)
PROT UR QL STRIP: ABNORMAL
RBC #/AREA URNS HPF: 0 /HPF (ref 0–4)
SP GR UR STRIP: >=1.03 (ref 1–1.03)
URN SPEC COLLECT METH UR: ABNORMAL
WBC #/AREA URNS HPF: 1 /HPF (ref 0–5)

## 2021-01-19 PROCEDURE — 82550 ASSAY OF CK (CPK): CPT

## 2021-01-19 PROCEDURE — 36415 COLL VENOUS BLD VENIPUNCTURE: CPT

## 2021-01-19 PROCEDURE — 80069 RENAL FUNCTION PANEL: CPT

## 2021-01-19 PROCEDURE — 81000 URINALYSIS NONAUTO W/SCOPE: CPT

## 2021-01-20 LAB
ALBUMIN SERPL BCP-MCNC: 4 G/DL (ref 3.5–5.2)
ANION GAP SERPL CALC-SCNC: 8 MMOL/L (ref 8–16)
BUN SERPL-MCNC: 12 MG/DL (ref 6–20)
CALCIUM SERPL-MCNC: 9.6 MG/DL (ref 8.7–10.5)
CHLORIDE SERPL-SCNC: 100 MMOL/L (ref 95–110)
CK SERPL-CCNC: 490 U/L (ref 20–200)
CO2 SERPL-SCNC: 31 MMOL/L (ref 23–29)
CREAT SERPL-MCNC: 1.6 MG/DL (ref 0.5–1.4)
EST. GFR  (AFRICAN AMERICAN): 58.4 ML/MIN/1.73 M^2
EST. GFR  (NON AFRICAN AMERICAN): 50.6 ML/MIN/1.73 M^2
GLUCOSE SERPL-MCNC: 89 MG/DL (ref 70–110)
PHOSPHATE SERPL-MCNC: 4.5 MG/DL (ref 2.7–4.5)
POTASSIUM SERPL-SCNC: 3.7 MMOL/L (ref 3.5–5.1)
SODIUM SERPL-SCNC: 139 MMOL/L (ref 136–145)

## 2021-02-05 DIAGNOSIS — N52.9 ERECTILE DYSFUNCTION, UNSPECIFIED ERECTILE DYSFUNCTION TYPE: ICD-10-CM

## 2021-02-05 RX ORDER — TADALAFIL 20 MG/1
TABLET ORAL
Qty: 15 TABLET | Refills: 0 | Status: SHIPPED | OUTPATIENT
Start: 2021-02-05 | End: 2021-03-15 | Stop reason: SDUPTHER

## 2021-02-08 ENCOUNTER — OFFICE VISIT (OUTPATIENT)
Dept: ORTHOPEDICS | Facility: CLINIC | Age: 46
End: 2021-02-08
Payer: OTHER GOVERNMENT

## 2021-02-08 VITALS
BODY MASS INDEX: 33.42 KG/M2 | DIASTOLIC BLOOD PRESSURE: 91 MMHG | HEIGHT: 67 IN | HEART RATE: 95 BPM | SYSTOLIC BLOOD PRESSURE: 141 MMHG | WEIGHT: 212.94 LBS

## 2021-02-08 DIAGNOSIS — M17.0 PRIMARY OSTEOARTHRITIS OF BOTH KNEES: Primary | ICD-10-CM

## 2021-02-08 DIAGNOSIS — M25.562 LEFT KNEE PAIN, UNSPECIFIED CHRONICITY: Primary | ICD-10-CM

## 2021-02-08 DIAGNOSIS — M17.0 ARTHRITIS OF BOTH KNEES: ICD-10-CM

## 2021-02-08 PROCEDURE — 99214 OFFICE O/P EST MOD 30 MIN: CPT | Mod: 25,S$PBB,, | Performed by: FAMILY MEDICINE

## 2021-02-08 PROCEDURE — 99214 PR OFFICE/OUTPT VISIT, EST, LEVL IV, 30-39 MIN: ICD-10-PCS | Mod: 25,S$PBB,, | Performed by: FAMILY MEDICINE

## 2021-02-08 PROCEDURE — 99213 OFFICE O/P EST LOW 20 MIN: CPT | Mod: PBBFAC | Performed by: FAMILY MEDICINE

## 2021-02-08 PROCEDURE — 99999 PR PBB SHADOW E&M-EST. PATIENT-LVL III: CPT | Mod: PBBFAC,,, | Performed by: FAMILY MEDICINE

## 2021-02-08 PROCEDURE — 20610 DRAIN/INJ JOINT/BURSA W/O US: CPT | Mod: PBBFAC | Performed by: FAMILY MEDICINE

## 2021-02-08 PROCEDURE — 99999 PR PBB SHADOW E&M-EST. PATIENT-LVL III: ICD-10-PCS | Mod: PBBFAC,,, | Performed by: FAMILY MEDICINE

## 2021-02-08 PROCEDURE — 20610 LARGE JOINT ASPIRATION/INJECTION: L KNEE: ICD-10-PCS | Mod: S$PBB,LT,, | Performed by: FAMILY MEDICINE

## 2021-02-08 RX ORDER — BETAMETHASONE SODIUM PHOSPHATE AND BETAMETHASONE ACETATE 3; 3 MG/ML; MG/ML
6 INJECTION, SUSPENSION INTRA-ARTICULAR; INTRALESIONAL; INTRAMUSCULAR; SOFT TISSUE
Status: DISCONTINUED | OUTPATIENT
Start: 2021-02-08 | End: 2021-02-08 | Stop reason: HOSPADM

## 2021-02-08 RX ADMIN — BETAMETHASONE ACETATE AND BETAMETHASONE SODIUM PHOSPHATE 6 MG: 3; 3 INJECTION, SUSPENSION INTRA-ARTICULAR; INTRALESIONAL; INTRAMUSCULAR; SOFT TISSUE at 09:02

## 2021-02-10 ENCOUNTER — CLINICAL SUPPORT (OUTPATIENT)
Dept: REHABILITATION | Facility: HOSPITAL | Age: 46
End: 2021-02-10
Payer: OTHER GOVERNMENT

## 2021-02-10 DIAGNOSIS — M54.50 ACUTE RIGHT-SIDED LOW BACK PAIN WITHOUT SCIATICA: ICD-10-CM

## 2021-02-10 PROCEDURE — 97110 THERAPEUTIC EXERCISES: CPT | Performed by: PHYSICAL THERAPIST

## 2021-02-10 PROCEDURE — 97140 MANUAL THERAPY 1/> REGIONS: CPT | Performed by: PHYSICAL THERAPIST

## 2021-02-17 ENCOUNTER — CLINICAL SUPPORT (OUTPATIENT)
Dept: REHABILITATION | Facility: HOSPITAL | Age: 46
End: 2021-02-17
Payer: OTHER GOVERNMENT

## 2021-02-17 DIAGNOSIS — M54.50 ACUTE RIGHT-SIDED LOW BACK PAIN WITHOUT SCIATICA: ICD-10-CM

## 2021-02-17 PROCEDURE — 97110 THERAPEUTIC EXERCISES: CPT

## 2021-02-19 ENCOUNTER — CLINICAL SUPPORT (OUTPATIENT)
Dept: REHABILITATION | Facility: HOSPITAL | Age: 46
End: 2021-02-19
Payer: OTHER GOVERNMENT

## 2021-02-19 DIAGNOSIS — M54.50 ACUTE RIGHT-SIDED LOW BACK PAIN WITHOUT SCIATICA: ICD-10-CM

## 2021-02-19 PROCEDURE — 97140 MANUAL THERAPY 1/> REGIONS: CPT | Performed by: PHYSICAL THERAPIST

## 2021-02-19 PROCEDURE — 97110 THERAPEUTIC EXERCISES: CPT | Performed by: PHYSICAL THERAPIST

## 2021-02-22 ENCOUNTER — CLINICAL SUPPORT (OUTPATIENT)
Dept: REHABILITATION | Facility: HOSPITAL | Age: 46
End: 2021-02-22
Payer: OTHER GOVERNMENT

## 2021-02-22 DIAGNOSIS — M54.50 ACUTE RIGHT-SIDED LOW BACK PAIN WITHOUT SCIATICA: ICD-10-CM

## 2021-02-22 PROCEDURE — 97140 MANUAL THERAPY 1/> REGIONS: CPT | Mod: CQ

## 2021-02-22 PROCEDURE — 97110 THERAPEUTIC EXERCISES: CPT | Mod: CQ

## 2021-02-26 ENCOUNTER — OFFICE VISIT (OUTPATIENT)
Dept: NEPHROLOGY | Facility: CLINIC | Age: 46
End: 2021-02-26
Payer: OTHER GOVERNMENT

## 2021-02-26 ENCOUNTER — CLINICAL SUPPORT (OUTPATIENT)
Dept: REHABILITATION | Facility: HOSPITAL | Age: 46
End: 2021-02-26
Payer: OTHER GOVERNMENT

## 2021-02-26 ENCOUNTER — PATIENT OUTREACH (OUTPATIENT)
Dept: ADMINISTRATIVE | Facility: OTHER | Age: 46
End: 2021-02-26

## 2021-02-26 ENCOUNTER — DOCUMENTATION ONLY (OUTPATIENT)
Dept: REHABILITATION | Facility: HOSPITAL | Age: 46
End: 2021-02-26

## 2021-02-26 VITALS
RESPIRATION RATE: 20 BRPM | WEIGHT: 213.38 LBS | HEART RATE: 60 BPM | SYSTOLIC BLOOD PRESSURE: 120 MMHG | BODY MASS INDEX: 33.49 KG/M2 | DIASTOLIC BLOOD PRESSURE: 84 MMHG | HEIGHT: 67 IN

## 2021-02-26 DIAGNOSIS — N18.30 STAGE 3 CHRONIC KIDNEY DISEASE, UNSPECIFIED WHETHER STAGE 3A OR 3B CKD: Primary | ICD-10-CM

## 2021-02-26 DIAGNOSIS — M54.50 ACUTE RIGHT-SIDED LOW BACK PAIN WITHOUT SCIATICA: ICD-10-CM

## 2021-02-26 PROCEDURE — 99214 OFFICE O/P EST MOD 30 MIN: CPT | Mod: S$PBB,,, | Performed by: INTERNAL MEDICINE

## 2021-02-26 PROCEDURE — 99999 PR PBB SHADOW E&M-EST. PATIENT-LVL IV: CPT | Mod: PBBFAC,,, | Performed by: INTERNAL MEDICINE

## 2021-02-26 PROCEDURE — 99999 PR PBB SHADOW E&M-EST. PATIENT-LVL IV: ICD-10-PCS | Mod: PBBFAC,,, | Performed by: INTERNAL MEDICINE

## 2021-02-26 PROCEDURE — 97110 THERAPEUTIC EXERCISES: CPT | Mod: CQ

## 2021-02-26 PROCEDURE — 99214 PR OFFICE/OUTPT VISIT, EST, LEVL IV, 30-39 MIN: ICD-10-PCS | Mod: S$PBB,,, | Performed by: INTERNAL MEDICINE

## 2021-02-26 PROCEDURE — 99214 OFFICE O/P EST MOD 30 MIN: CPT | Mod: PBBFAC | Performed by: INTERNAL MEDICINE

## 2021-02-26 PROCEDURE — 97140 MANUAL THERAPY 1/> REGIONS: CPT | Mod: CQ

## 2021-03-03 ENCOUNTER — CLINICAL SUPPORT (OUTPATIENT)
Dept: REHABILITATION | Facility: HOSPITAL | Age: 46
End: 2021-03-03
Payer: OTHER GOVERNMENT

## 2021-03-03 DIAGNOSIS — M54.50 ACUTE RIGHT-SIDED LOW BACK PAIN WITHOUT SCIATICA: ICD-10-CM

## 2021-03-03 PROCEDURE — 97140 MANUAL THERAPY 1/> REGIONS: CPT | Performed by: PHYSICAL THERAPIST

## 2021-03-03 PROCEDURE — 97110 THERAPEUTIC EXERCISES: CPT | Performed by: PHYSICAL THERAPIST

## 2021-03-05 ENCOUNTER — CLINICAL SUPPORT (OUTPATIENT)
Dept: REHABILITATION | Facility: HOSPITAL | Age: 46
End: 2021-03-05
Payer: OTHER GOVERNMENT

## 2021-03-05 DIAGNOSIS — M54.50 ACUTE RIGHT-SIDED LOW BACK PAIN WITHOUT SCIATICA: ICD-10-CM

## 2021-03-05 PROCEDURE — 97110 THERAPEUTIC EXERCISES: CPT | Performed by: PHYSICAL THERAPIST

## 2021-03-05 PROCEDURE — 97140 MANUAL THERAPY 1/> REGIONS: CPT | Performed by: PHYSICAL THERAPIST

## 2021-03-09 ENCOUNTER — CLINICAL SUPPORT (OUTPATIENT)
Dept: REHABILITATION | Facility: HOSPITAL | Age: 46
End: 2021-03-09
Payer: OTHER GOVERNMENT

## 2021-03-09 DIAGNOSIS — M54.50 ACUTE RIGHT-SIDED LOW BACK PAIN WITHOUT SCIATICA: ICD-10-CM

## 2021-03-09 PROCEDURE — 97110 THERAPEUTIC EXERCISES: CPT | Performed by: PHYSICAL THERAPIST

## 2021-03-09 PROCEDURE — 97140 MANUAL THERAPY 1/> REGIONS: CPT | Performed by: PHYSICAL THERAPIST

## 2021-03-15 ENCOUNTER — OFFICE VISIT (OUTPATIENT)
Dept: INTERNAL MEDICINE | Facility: CLINIC | Age: 46
End: 2021-03-15
Payer: OTHER GOVERNMENT

## 2021-03-15 VITALS
BODY MASS INDEX: 33.01 KG/M2 | DIASTOLIC BLOOD PRESSURE: 82 MMHG | SYSTOLIC BLOOD PRESSURE: 122 MMHG | WEIGHT: 210.31 LBS | HEART RATE: 95 BPM | HEIGHT: 67 IN | RESPIRATION RATE: 18 BRPM | OXYGEN SATURATION: 98 % | TEMPERATURE: 98 F

## 2021-03-15 DIAGNOSIS — N52.9 ERECTILE DYSFUNCTION, UNSPECIFIED ERECTILE DYSFUNCTION TYPE: ICD-10-CM

## 2021-03-15 DIAGNOSIS — I10 ESSENTIAL HYPERTENSION: ICD-10-CM

## 2021-03-15 DIAGNOSIS — Z00.00 ROUTINE GENERAL MEDICAL EXAMINATION AT A HEALTH CARE FACILITY: Primary | ICD-10-CM

## 2021-03-15 DIAGNOSIS — M1A.9XX0 CHRONIC GOUT WITHOUT TOPHUS, UNSPECIFIED CAUSE, UNSPECIFIED SITE: ICD-10-CM

## 2021-03-15 DIAGNOSIS — N40.0 BENIGN PROSTATIC HYPERPLASIA WITHOUT LOWER URINARY TRACT SYMPTOMS: ICD-10-CM

## 2021-03-15 DIAGNOSIS — N18.9 CHRONIC KIDNEY DISEASE, UNSPECIFIED CKD STAGE: ICD-10-CM

## 2021-03-15 DIAGNOSIS — G43.109 MIGRAINE WITH AURA AND WITHOUT STATUS MIGRAINOSUS, NOT INTRACTABLE: ICD-10-CM

## 2021-03-15 DIAGNOSIS — F43.10 PTSD (POST-TRAUMATIC STRESS DISORDER): ICD-10-CM

## 2021-03-15 PROBLEM — M54.50 ACUTE RIGHT-SIDED LOW BACK PAIN WITHOUT SCIATICA: Status: RESOLVED | Noted: 2020-11-16 | Resolved: 2021-03-15

## 2021-03-15 PROCEDURE — 99396 PREV VISIT EST AGE 40-64: CPT | Mod: S$PBB,,, | Performed by: INTERNAL MEDICINE

## 2021-03-15 PROCEDURE — 99999 PR PBB SHADOW E&M-EST. PATIENT-LVL III: ICD-10-PCS | Mod: PBBFAC,,, | Performed by: INTERNAL MEDICINE

## 2021-03-15 PROCEDURE — 99999 PR PBB SHADOW E&M-EST. PATIENT-LVL III: CPT | Mod: PBBFAC,,, | Performed by: INTERNAL MEDICINE

## 2021-03-15 PROCEDURE — 99396 PR PREVENTIVE VISIT,EST,40-64: ICD-10-PCS | Mod: S$PBB,,, | Performed by: INTERNAL MEDICINE

## 2021-03-15 PROCEDURE — 99213 OFFICE O/P EST LOW 20 MIN: CPT | Mod: PBBFAC | Performed by: INTERNAL MEDICINE

## 2021-03-15 RX ORDER — TADALAFIL 20 MG/1
TABLET ORAL
Qty: 90 TABLET | Refills: 0 | Status: SHIPPED | OUTPATIENT
Start: 2021-03-15 | End: 2021-06-14 | Stop reason: SDUPTHER

## 2021-03-15 RX ORDER — SERTRALINE HYDROCHLORIDE 100 MG/1
100 TABLET, FILM COATED ORAL DAILY
Qty: 90 TABLET | Refills: 3 | Status: SHIPPED | OUTPATIENT
Start: 2021-03-15

## 2021-03-15 RX ORDER — TRAZODONE HYDROCHLORIDE 150 MG/1
150 TABLET ORAL NIGHTLY
Qty: 90 TABLET | Refills: 2 | Status: SHIPPED | OUTPATIENT
Start: 2021-03-15 | End: 2022-03-15

## 2021-03-15 RX ORDER — TOPIRAMATE 25 MG/1
25 TABLET ORAL 2 TIMES DAILY
Qty: 180 TABLET | Refills: 1 | Status: SHIPPED | OUTPATIENT
Start: 2021-03-15 | End: 2021-09-14

## 2021-03-15 RX ORDER — AMLODIPINE BESYLATE 5 MG/1
5 TABLET ORAL DAILY
Qty: 90 TABLET | Refills: 1 | Status: SHIPPED | OUTPATIENT
Start: 2021-03-15 | End: 2021-09-11

## 2021-03-15 RX ORDER — RIZATRIPTAN BENZOATE 10 MG/1
10 TABLET, ORALLY DISINTEGRATING ORAL DAILY PRN
Qty: 30 TABLET | Refills: 0 | Status: SHIPPED | OUTPATIENT
Start: 2021-03-15 | End: 2021-06-14 | Stop reason: SDUPTHER

## 2021-03-16 ENCOUNTER — LAB VISIT (OUTPATIENT)
Dept: LAB | Facility: HOSPITAL | Age: 46
End: 2021-03-16
Attending: INTERNAL MEDICINE
Payer: OTHER GOVERNMENT

## 2021-03-16 ENCOUNTER — CLINICAL SUPPORT (OUTPATIENT)
Dept: REHABILITATION | Facility: HOSPITAL | Age: 46
End: 2021-03-16
Payer: OTHER GOVERNMENT

## 2021-03-16 DIAGNOSIS — M54.50 ACUTE RIGHT-SIDED LOW BACK PAIN WITHOUT SCIATICA: Primary | ICD-10-CM

## 2021-03-16 DIAGNOSIS — Z00.00 ROUTINE GENERAL MEDICAL EXAMINATION AT A HEALTH CARE FACILITY: ICD-10-CM

## 2021-03-16 LAB
BASOPHILS # BLD AUTO: 0.03 K/UL (ref 0–0.2)
BASOPHILS NFR BLD: 0.7 % (ref 0–1.9)
COMPLEXED PSA SERPL-MCNC: 0.65 NG/ML (ref 0–4)
DIFFERENTIAL METHOD: ABNORMAL
EOSINOPHIL # BLD AUTO: 0.1 K/UL (ref 0–0.5)
EOSINOPHIL NFR BLD: 1.2 % (ref 0–8)
ERYTHROCYTE [DISTWIDTH] IN BLOOD BY AUTOMATED COUNT: 14 % (ref 11.5–14.5)
HCT VFR BLD AUTO: 38.1 % (ref 40–54)
HGB BLD-MCNC: 13.6 G/DL (ref 14–18)
IMM GRANULOCYTES # BLD AUTO: 0.01 K/UL (ref 0–0.04)
IMM GRANULOCYTES NFR BLD AUTO: 0.2 % (ref 0–0.5)
LYMPHOCYTES # BLD AUTO: 1.5 K/UL (ref 1–4.8)
LYMPHOCYTES NFR BLD: 34.1 % (ref 18–48)
MCH RBC QN AUTO: 31.9 PG (ref 27–31)
MCHC RBC AUTO-ENTMCNC: 35.7 G/DL (ref 32–36)
MCV RBC AUTO: 89 FL (ref 82–98)
MONOCYTES # BLD AUTO: 0.6 K/UL (ref 0.3–1)
MONOCYTES NFR BLD: 13.6 % (ref 4–15)
NEUTROPHILS # BLD AUTO: 2.2 K/UL (ref 1.8–7.7)
NEUTROPHILS NFR BLD: 50.2 % (ref 38–73)
NRBC BLD-RTO: 0 /100 WBC
PLATELET # BLD AUTO: 251 K/UL (ref 150–350)
PMV BLD AUTO: 10.4 FL (ref 9.2–12.9)
RBC # BLD AUTO: 4.27 M/UL (ref 4.6–6.2)
WBC # BLD AUTO: 4.28 K/UL (ref 3.9–12.7)

## 2021-03-16 PROCEDURE — 85025 COMPLETE CBC W/AUTO DIFF WBC: CPT | Performed by: INTERNAL MEDICINE

## 2021-03-16 PROCEDURE — 97110 THERAPEUTIC EXERCISES: CPT | Performed by: PHYSICAL THERAPIST

## 2021-03-16 PROCEDURE — 80076 HEPATIC FUNCTION PANEL: CPT | Performed by: INTERNAL MEDICINE

## 2021-03-16 PROCEDURE — 80061 LIPID PANEL: CPT | Performed by: INTERNAL MEDICINE

## 2021-03-16 PROCEDURE — 84443 ASSAY THYROID STIM HORMONE: CPT | Performed by: INTERNAL MEDICINE

## 2021-03-16 PROCEDURE — 83036 HEMOGLOBIN GLYCOSYLATED A1C: CPT | Performed by: INTERNAL MEDICINE

## 2021-03-16 PROCEDURE — 84153 ASSAY OF PSA TOTAL: CPT | Performed by: INTERNAL MEDICINE

## 2021-03-16 PROCEDURE — 97140 MANUAL THERAPY 1/> REGIONS: CPT | Performed by: PHYSICAL THERAPIST

## 2021-03-16 PROCEDURE — 36415 COLL VENOUS BLD VENIPUNCTURE: CPT | Performed by: INTERNAL MEDICINE

## 2021-03-16 PROCEDURE — 86803 HEPATITIS C AB TEST: CPT | Performed by: INTERNAL MEDICINE

## 2021-03-17 LAB
ALBUMIN SERPL BCP-MCNC: 3.6 G/DL (ref 3.5–5.2)
ALP SERPL-CCNC: 71 U/L (ref 55–135)
ALT SERPL W/O P-5'-P-CCNC: 18 U/L (ref 10–44)
AST SERPL-CCNC: 32 U/L (ref 10–40)
BILIRUB DIRECT SERPL-MCNC: 0.3 MG/DL (ref 0.1–0.3)
BILIRUB SERPL-MCNC: 0.6 MG/DL (ref 0.1–1)
CHOLEST SERPL-MCNC: 182 MG/DL (ref 120–199)
CHOLEST/HDLC SERPL: 2.8 {RATIO} (ref 2–5)
ESTIMATED AVG GLUCOSE: 100 MG/DL (ref 68–131)
HBA1C MFR BLD: 5.1 % (ref 4–5.6)
HCV AB SERPL QL IA: NEGATIVE
HDLC SERPL-MCNC: 66 MG/DL (ref 40–75)
HDLC SERPL: 36.3 % (ref 20–50)
LDLC SERPL CALC-MCNC: 101 MG/DL (ref 63–159)
NONHDLC SERPL-MCNC: 116 MG/DL
PROT SERPL-MCNC: 7.4 G/DL (ref 6–8.4)
TRIGL SERPL-MCNC: 75 MG/DL (ref 30–150)
TSH SERPL DL<=0.005 MIU/L-ACNC: 1.93 UIU/ML (ref 0.4–4)

## 2021-03-18 ENCOUNTER — CLINICAL SUPPORT (OUTPATIENT)
Dept: REHABILITATION | Facility: HOSPITAL | Age: 46
End: 2021-03-18
Payer: OTHER GOVERNMENT

## 2021-03-18 DIAGNOSIS — M54.50 ACUTE RIGHT-SIDED LOW BACK PAIN WITHOUT SCIATICA: ICD-10-CM

## 2021-03-18 PROCEDURE — 97140 MANUAL THERAPY 1/> REGIONS: CPT | Performed by: PHYSICAL THERAPIST

## 2021-03-18 PROCEDURE — 97110 THERAPEUTIC EXERCISES: CPT | Performed by: PHYSICAL THERAPIST

## 2021-03-22 ENCOUNTER — CLINICAL SUPPORT (OUTPATIENT)
Dept: REHABILITATION | Facility: HOSPITAL | Age: 46
End: 2021-03-22
Payer: OTHER GOVERNMENT

## 2021-03-22 DIAGNOSIS — M54.50 ACUTE RIGHT-SIDED LOW BACK PAIN WITHOUT SCIATICA: ICD-10-CM

## 2021-03-22 PROCEDURE — 97110 THERAPEUTIC EXERCISES: CPT | Performed by: PHYSICAL THERAPIST

## 2021-03-22 PROCEDURE — 97140 MANUAL THERAPY 1/> REGIONS: CPT | Performed by: PHYSICAL THERAPIST

## 2021-03-23 ENCOUNTER — OFFICE VISIT (OUTPATIENT)
Dept: ORTHOPEDICS | Facility: CLINIC | Age: 46
End: 2021-03-23
Payer: OTHER GOVERNMENT

## 2021-03-23 VITALS
SYSTOLIC BLOOD PRESSURE: 139 MMHG | HEART RATE: 77 BPM | BODY MASS INDEX: 33.01 KG/M2 | DIASTOLIC BLOOD PRESSURE: 98 MMHG | WEIGHT: 210.31 LBS | HEIGHT: 67 IN

## 2021-03-23 DIAGNOSIS — M17.0 PRIMARY OSTEOARTHRITIS OF BOTH KNEES: Primary | ICD-10-CM

## 2021-03-23 PROCEDURE — 99213 OFFICE O/P EST LOW 20 MIN: CPT | Mod: PBBFAC | Performed by: FAMILY MEDICINE

## 2021-03-23 PROCEDURE — 99999 PR PBB SHADOW E&M-EST. PATIENT-LVL III: CPT | Mod: PBBFAC,,, | Performed by: FAMILY MEDICINE

## 2021-03-23 PROCEDURE — 20610 LARGE JOINT ASPIRATION/INJECTION: BILATERAL KNEE: ICD-10-PCS | Mod: 50,S$PBB,, | Performed by: FAMILY MEDICINE

## 2021-03-23 PROCEDURE — 20610 DRAIN/INJ JOINT/BURSA W/O US: CPT | Mod: 50,PBBFAC | Performed by: FAMILY MEDICINE

## 2021-03-23 PROCEDURE — 99499 NO LOS: ICD-10-PCS | Mod: S$PBB,,, | Performed by: FAMILY MEDICINE

## 2021-03-23 PROCEDURE — 99999 PR PBB SHADOW E&M-EST. PATIENT-LVL III: ICD-10-PCS | Mod: PBBFAC,,, | Performed by: FAMILY MEDICINE

## 2021-03-23 PROCEDURE — 99499 UNLISTED E&M SERVICE: CPT | Mod: S$PBB,,, | Performed by: FAMILY MEDICINE

## 2021-03-23 RX ADMIN — Medication 20 MG: at 09:03

## 2021-03-29 ENCOUNTER — PATIENT OUTREACH (OUTPATIENT)
Dept: ADMINISTRATIVE | Facility: OTHER | Age: 46
End: 2021-03-29

## 2021-03-30 ENCOUNTER — OFFICE VISIT (OUTPATIENT)
Dept: ORTHOPEDICS | Facility: CLINIC | Age: 46
End: 2021-03-30
Payer: OTHER GOVERNMENT

## 2021-03-30 VITALS
SYSTOLIC BLOOD PRESSURE: 137 MMHG | WEIGHT: 210.31 LBS | DIASTOLIC BLOOD PRESSURE: 92 MMHG | HEART RATE: 63 BPM | BODY MASS INDEX: 33.01 KG/M2 | HEIGHT: 67 IN

## 2021-03-30 DIAGNOSIS — M25.729 OLECRANON BONE SPUR: ICD-10-CM

## 2021-03-30 DIAGNOSIS — M25.522 BILATERAL ELBOW JOINT PAIN: ICD-10-CM

## 2021-03-30 DIAGNOSIS — M25.521 BILATERAL ELBOW JOINT PAIN: ICD-10-CM

## 2021-03-30 DIAGNOSIS — M17.0 PRIMARY OSTEOARTHRITIS OF BOTH KNEES: Primary | ICD-10-CM

## 2021-03-30 PROCEDURE — 99213 OFFICE O/P EST LOW 20 MIN: CPT | Mod: PBBFAC | Performed by: FAMILY MEDICINE

## 2021-03-30 PROCEDURE — 20610 LARGE JOINT ASPIRATION/INJECTION: BILATERAL KNEE: ICD-10-PCS | Mod: 50,S$PBB,, | Performed by: FAMILY MEDICINE

## 2021-03-30 PROCEDURE — 99999 PR PBB SHADOW E&M-EST. PATIENT-LVL III: CPT | Mod: PBBFAC,,, | Performed by: FAMILY MEDICINE

## 2021-03-30 PROCEDURE — 99213 OFFICE O/P EST LOW 20 MIN: CPT | Mod: S$PBB,25,, | Performed by: FAMILY MEDICINE

## 2021-03-30 PROCEDURE — 99999 PR PBB SHADOW E&M-EST. PATIENT-LVL III: ICD-10-PCS | Mod: PBBFAC,,, | Performed by: FAMILY MEDICINE

## 2021-03-30 PROCEDURE — 20610 DRAIN/INJ JOINT/BURSA W/O US: CPT | Mod: 50,PBBFAC | Performed by: FAMILY MEDICINE

## 2021-03-30 PROCEDURE — 99213 PR OFFICE/OUTPT VISIT, EST, LEVL III, 20-29 MIN: ICD-10-PCS | Mod: S$PBB,25,, | Performed by: FAMILY MEDICINE

## 2021-03-30 RX ADMIN — Medication 20 MG: at 08:03

## 2021-04-07 ENCOUNTER — OFFICE VISIT (OUTPATIENT)
Dept: ORTHOPEDICS | Facility: CLINIC | Age: 46
End: 2021-04-07
Payer: OTHER GOVERNMENT

## 2021-04-07 VITALS — HEIGHT: 67 IN | BODY MASS INDEX: 33.43 KG/M2 | WEIGHT: 213 LBS | RESPIRATION RATE: 20 BRPM

## 2021-04-07 DIAGNOSIS — M17.0 PRIMARY OSTEOARTHRITIS OF BOTH KNEES: Primary | ICD-10-CM

## 2021-04-07 PROCEDURE — 20610 DRAIN/INJ JOINT/BURSA W/O US: CPT | Mod: 50,PBBFAC | Performed by: STUDENT IN AN ORGANIZED HEALTH CARE EDUCATION/TRAINING PROGRAM

## 2021-04-07 PROCEDURE — 99999 PR PBB SHADOW E&M-EST. PATIENT-LVL III: CPT | Mod: PBBFAC,,, | Performed by: STUDENT IN AN ORGANIZED HEALTH CARE EDUCATION/TRAINING PROGRAM

## 2021-04-07 PROCEDURE — 99499 NO LOS: ICD-10-PCS | Mod: S$PBB,,, | Performed by: STUDENT IN AN ORGANIZED HEALTH CARE EDUCATION/TRAINING PROGRAM

## 2021-04-07 PROCEDURE — 99999 PR PBB SHADOW E&M-EST. PATIENT-LVL III: ICD-10-PCS | Mod: PBBFAC,,, | Performed by: STUDENT IN AN ORGANIZED HEALTH CARE EDUCATION/TRAINING PROGRAM

## 2021-04-07 PROCEDURE — 99213 OFFICE O/P EST LOW 20 MIN: CPT | Mod: PBBFAC | Performed by: STUDENT IN AN ORGANIZED HEALTH CARE EDUCATION/TRAINING PROGRAM

## 2021-04-07 PROCEDURE — 99499 UNLISTED E&M SERVICE: CPT | Mod: S$PBB,,, | Performed by: STUDENT IN AN ORGANIZED HEALTH CARE EDUCATION/TRAINING PROGRAM

## 2021-04-07 PROCEDURE — 20610 LARGE JOINT ASPIRATION/INJECTION: BILATERAL KNEE: ICD-10-PCS | Mod: 50,S$PBB,, | Performed by: STUDENT IN AN ORGANIZED HEALTH CARE EDUCATION/TRAINING PROGRAM

## 2021-04-07 RX ADMIN — Medication 20 MG: at 10:04

## 2021-04-13 ENCOUNTER — OFFICE VISIT (OUTPATIENT)
Dept: ORTHOPEDICS | Facility: CLINIC | Age: 46
End: 2021-04-13
Payer: OTHER GOVERNMENT

## 2021-04-13 DIAGNOSIS — S46.311A TRICEPS TENDON RUPTURE, RIGHT, INITIAL ENCOUNTER: Primary | ICD-10-CM

## 2021-04-13 PROCEDURE — 99211 OFF/OP EST MAY X REQ PHY/QHP: CPT | Mod: PBBFAC | Performed by: STUDENT IN AN ORGANIZED HEALTH CARE EDUCATION/TRAINING PROGRAM

## 2021-04-13 PROCEDURE — 99999 PR PBB SHADOW E&M-EST. PATIENT-LVL I: CPT | Mod: PBBFAC,,, | Performed by: STUDENT IN AN ORGANIZED HEALTH CARE EDUCATION/TRAINING PROGRAM

## 2021-04-13 PROCEDURE — 99213 OFFICE O/P EST LOW 20 MIN: CPT | Mod: S$PBB,,, | Performed by: STUDENT IN AN ORGANIZED HEALTH CARE EDUCATION/TRAINING PROGRAM

## 2021-04-13 PROCEDURE — 99213 PR OFFICE/OUTPT VISIT, EST, LEVL III, 20-29 MIN: ICD-10-PCS | Mod: S$PBB,,, | Performed by: STUDENT IN AN ORGANIZED HEALTH CARE EDUCATION/TRAINING PROGRAM

## 2021-04-13 PROCEDURE — 99999 PR PBB SHADOW E&M-EST. PATIENT-LVL I: ICD-10-PCS | Mod: PBBFAC,,, | Performed by: STUDENT IN AN ORGANIZED HEALTH CARE EDUCATION/TRAINING PROGRAM

## 2021-04-16 ENCOUNTER — HOSPITAL ENCOUNTER (OUTPATIENT)
Dept: RADIOLOGY | Facility: HOSPITAL | Age: 46
Discharge: HOME | End: 2021-04-16
Attending: STUDENT IN AN ORGANIZED HEALTH CARE EDUCATION/TRAINING PROGRAM
Payer: OTHER GOVERNMENT

## 2021-04-16 DIAGNOSIS — S46.311A TRICEPS TENDON RUPTURE, RIGHT, INITIAL ENCOUNTER: ICD-10-CM

## 2021-04-19 ENCOUNTER — TELEPHONE (OUTPATIENT)
Dept: OPHTHALMOLOGY | Facility: CLINIC | Age: 46
End: 2021-04-19

## 2021-04-19 ENCOUNTER — HOSPITAL ENCOUNTER (OUTPATIENT)
Dept: RADIOLOGY | Facility: HOSPITAL | Age: 46
Discharge: HOME | End: 2021-04-19
Attending: STUDENT IN AN ORGANIZED HEALTH CARE EDUCATION/TRAINING PROGRAM
Payer: OTHER GOVERNMENT

## 2021-04-19 PROCEDURE — 73221 MRI JOINT UPR EXTREM W/O DYE: CPT | Mod: TC,PO,RT

## 2021-04-19 PROCEDURE — 73221 MRI JOINT UPR EXTREM W/O DYE: CPT | Mod: 26,RT,, | Performed by: RADIOLOGY

## 2021-04-19 PROCEDURE — 73221 MRI ELBOW WITHOUT CONTRAST RIGHT: ICD-10-PCS | Mod: 26,RT,, | Performed by: RADIOLOGY

## 2021-04-20 ENCOUNTER — OFFICE VISIT (OUTPATIENT)
Dept: ORTHOPEDICS | Facility: CLINIC | Age: 46
End: 2021-04-20
Payer: OTHER GOVERNMENT

## 2021-04-20 VITALS — WEIGHT: 213 LBS | BODY MASS INDEX: 33.43 KG/M2 | HEIGHT: 67 IN

## 2021-04-20 DIAGNOSIS — S46.311D TRICEPS TENDON RUPTURE, RIGHT, SUBSEQUENT ENCOUNTER: Primary | ICD-10-CM

## 2021-04-20 PROCEDURE — 99213 OFFICE O/P EST LOW 20 MIN: CPT | Mod: PBBFAC | Performed by: STUDENT IN AN ORGANIZED HEALTH CARE EDUCATION/TRAINING PROGRAM

## 2021-04-20 PROCEDURE — 99999 PR PBB SHADOW E&M-EST. PATIENT-LVL III: CPT | Mod: PBBFAC,,, | Performed by: STUDENT IN AN ORGANIZED HEALTH CARE EDUCATION/TRAINING PROGRAM

## 2021-04-20 PROCEDURE — 99213 OFFICE O/P EST LOW 20 MIN: CPT | Mod: S$PBB,,, | Performed by: STUDENT IN AN ORGANIZED HEALTH CARE EDUCATION/TRAINING PROGRAM

## 2021-04-20 PROCEDURE — 99213 PR OFFICE/OUTPT VISIT, EST, LEVL III, 20-29 MIN: ICD-10-PCS | Mod: S$PBB,,, | Performed by: STUDENT IN AN ORGANIZED HEALTH CARE EDUCATION/TRAINING PROGRAM

## 2021-04-20 PROCEDURE — 99999 PR PBB SHADOW E&M-EST. PATIENT-LVL III: ICD-10-PCS | Mod: PBBFAC,,, | Performed by: STUDENT IN AN ORGANIZED HEALTH CARE EDUCATION/TRAINING PROGRAM

## 2021-04-28 DIAGNOSIS — B00.9 HSV-2 INFECTION: ICD-10-CM

## 2021-04-29 ENCOUNTER — PATIENT MESSAGE (OUTPATIENT)
Dept: RESEARCH | Facility: HOSPITAL | Age: 46
End: 2021-04-29

## 2021-04-29 ENCOUNTER — PATIENT OUTREACH (OUTPATIENT)
Dept: ADMINISTRATIVE | Facility: OTHER | Age: 46
End: 2021-04-29

## 2021-04-30 ENCOUNTER — OFFICE VISIT (OUTPATIENT)
Dept: RHEUMATOLOGY | Facility: CLINIC | Age: 46
End: 2021-04-30
Payer: OTHER GOVERNMENT

## 2021-04-30 VITALS
HEART RATE: 72 BPM | SYSTOLIC BLOOD PRESSURE: 150 MMHG | HEIGHT: 67 IN | WEIGHT: 213.19 LBS | DIASTOLIC BLOOD PRESSURE: 103 MMHG | BODY MASS INDEX: 33.46 KG/M2

## 2021-04-30 DIAGNOSIS — R74.8 HYPERCKEMIA: ICD-10-CM

## 2021-04-30 DIAGNOSIS — M1A.9XX0 CHRONIC GOUT WITHOUT TOPHUS, UNSPECIFIED CAUSE, UNSPECIFIED SITE: Primary | ICD-10-CM

## 2021-04-30 DIAGNOSIS — Z71.89 COUNSELING ON HEALTH PROMOTION AND DISEASE PREVENTION: ICD-10-CM

## 2021-04-30 PROCEDURE — 99214 PR OFFICE/OUTPT VISIT, EST, LEVL IV, 30-39 MIN: ICD-10-PCS | Mod: S$PBB,,, | Performed by: INTERNAL MEDICINE

## 2021-04-30 PROCEDURE — 99999 PR PBB SHADOW E&M-EST. PATIENT-LVL III: CPT | Mod: PBBFAC,,, | Performed by: INTERNAL MEDICINE

## 2021-04-30 PROCEDURE — 99214 OFFICE O/P EST MOD 30 MIN: CPT | Mod: S$PBB,,, | Performed by: INTERNAL MEDICINE

## 2021-04-30 PROCEDURE — 99999 PR PBB SHADOW E&M-EST. PATIENT-LVL III: ICD-10-PCS | Mod: PBBFAC,,, | Performed by: INTERNAL MEDICINE

## 2021-04-30 PROCEDURE — 99213 OFFICE O/P EST LOW 20 MIN: CPT | Mod: PBBFAC | Performed by: INTERNAL MEDICINE

## 2021-04-30 RX ORDER — PREDNISONE 50 MG/1
50 TABLET ORAL DAILY
Qty: 20 TABLET | Refills: 0 | Status: SHIPPED | OUTPATIENT
Start: 2021-04-30 | End: 2021-05-10

## 2021-04-30 RX ORDER — ALLOPURINOL 300 MG/1
300 TABLET ORAL DAILY
COMMUNITY
End: 2021-04-30 | Stop reason: SDUPTHER

## 2021-04-30 RX ORDER — VALACYCLOVIR HYDROCHLORIDE 500 MG/1
500 TABLET, FILM COATED ORAL 2 TIMES DAILY PRN
Qty: 30 TABLET | Refills: 0 | Status: SHIPPED | OUTPATIENT
Start: 2021-04-30 | End: 2021-06-14 | Stop reason: SDUPTHER

## 2021-04-30 RX ORDER — FEBUXOSTAT 40 MG/1
80 TABLET, FILM COATED ORAL DAILY
Qty: 240 TABLET | Refills: 0 | Status: SHIPPED | OUTPATIENT
Start: 2021-04-30 | End: 2021-08-28

## 2021-04-30 RX ORDER — ALLOPURINOL 300 MG/1
300 TABLET ORAL DAILY
Qty: 120 TABLET | Refills: 0 | Status: SHIPPED | OUTPATIENT
Start: 2021-04-30 | End: 2021-04-30

## 2021-05-10 ENCOUNTER — OFFICE VISIT (OUTPATIENT)
Dept: INTERNAL MEDICINE | Facility: CLINIC | Age: 46
End: 2021-05-10
Payer: OTHER GOVERNMENT

## 2021-05-10 VITALS
RESPIRATION RATE: 18 BRPM | WEIGHT: 212.75 LBS | SYSTOLIC BLOOD PRESSURE: 118 MMHG | OXYGEN SATURATION: 96 % | DIASTOLIC BLOOD PRESSURE: 82 MMHG | TEMPERATURE: 99 F | HEART RATE: 63 BPM | HEIGHT: 67 IN | BODY MASS INDEX: 33.39 KG/M2

## 2021-05-10 DIAGNOSIS — S16.1XXA STRAIN OF NECK MUSCLE, INITIAL ENCOUNTER: Primary | ICD-10-CM

## 2021-05-10 PROCEDURE — 99999 PR PBB SHADOW E&M-EST. PATIENT-LVL V: ICD-10-PCS | Mod: PBBFAC,,, | Performed by: INTERNAL MEDICINE

## 2021-05-10 PROCEDURE — 99215 OFFICE O/P EST HI 40 MIN: CPT | Mod: PBBFAC | Performed by: INTERNAL MEDICINE

## 2021-05-10 PROCEDURE — 99214 PR OFFICE/OUTPT VISIT, EST, LEVL IV, 30-39 MIN: ICD-10-PCS | Mod: S$PBB,,, | Performed by: INTERNAL MEDICINE

## 2021-05-10 PROCEDURE — 99214 OFFICE O/P EST MOD 30 MIN: CPT | Mod: S$PBB,,, | Performed by: INTERNAL MEDICINE

## 2021-05-10 PROCEDURE — 99999 PR PBB SHADOW E&M-EST. PATIENT-LVL V: CPT | Mod: PBBFAC,,, | Performed by: INTERNAL MEDICINE

## 2021-05-10 RX ORDER — CYCLOBENZAPRINE HCL 10 MG
TABLET ORAL
Qty: 15 TABLET | Refills: 0 | Status: SHIPPED | OUTPATIENT
Start: 2021-05-10 | End: 2021-06-14 | Stop reason: SDUPTHER

## 2021-05-10 RX ORDER — METHYLPREDNISOLONE 4 MG/1
TABLET ORAL
Qty: 1 PACKAGE | Refills: 0 | Status: SHIPPED | OUTPATIENT
Start: 2021-05-10

## 2021-05-11 ENCOUNTER — TELEPHONE (OUTPATIENT)
Dept: NEPHROLOGY | Facility: CLINIC | Age: 46
End: 2021-05-11

## 2021-05-24 ENCOUNTER — TELEPHONE (OUTPATIENT)
Dept: ORTHOPEDICS | Facility: CLINIC | Age: 46
End: 2021-05-24

## 2021-05-25 ENCOUNTER — OFFICE VISIT (OUTPATIENT)
Dept: PODIATRY | Facility: CLINIC | Age: 46
End: 2021-05-25
Payer: OTHER GOVERNMENT

## 2021-05-25 VITALS
HEIGHT: 67 IN | DIASTOLIC BLOOD PRESSURE: 79 MMHG | HEART RATE: 100 BPM | BODY MASS INDEX: 33.43 KG/M2 | WEIGHT: 213 LBS | SYSTOLIC BLOOD PRESSURE: 126 MMHG

## 2021-05-25 DIAGNOSIS — M79.89 MASS OF SOFT TISSUE OF FOOT: ICD-10-CM

## 2021-05-25 DIAGNOSIS — M72.2 PLANTAR FASCIITIS: Primary | ICD-10-CM

## 2021-05-25 DIAGNOSIS — M79.671 INFLAMMATORY HEEL PAIN, RIGHT: ICD-10-CM

## 2021-05-25 PROCEDURE — 99213 OFFICE O/P EST LOW 20 MIN: CPT | Mod: 25,S$PBB,, | Performed by: PODIATRIST

## 2021-05-25 PROCEDURE — 99213 OFFICE O/P EST LOW 20 MIN: CPT | Mod: PBBFAC | Performed by: PODIATRIST

## 2021-05-25 PROCEDURE — 99999 PR PBB SHADOW E&M-EST. PATIENT-LVL III: CPT | Mod: PBBFAC,,, | Performed by: PODIATRIST

## 2021-05-25 PROCEDURE — 99999 PR PBB SHADOW E&M-EST. PATIENT-LVL III: ICD-10-PCS | Mod: PBBFAC,,, | Performed by: PODIATRIST

## 2021-05-25 PROCEDURE — 99213 PR OFFICE/OUTPT VISIT, EST, LEVL III, 20-29 MIN: ICD-10-PCS | Mod: 25,S$PBB,, | Performed by: PODIATRIST

## 2021-05-25 RX ORDER — MELOXICAM 15 MG/1
15 TABLET ORAL DAILY
Qty: 30 TABLET | Refills: 0 | Status: SHIPPED | OUTPATIENT
Start: 2021-05-25 | End: 2021-06-14

## 2021-05-27 ENCOUNTER — PATIENT MESSAGE (OUTPATIENT)
Dept: ORTHOPEDICS | Facility: CLINIC | Age: 46
End: 2021-05-27

## 2021-05-27 ENCOUNTER — TELEPHONE (OUTPATIENT)
Dept: ORTHOPEDICS | Facility: CLINIC | Age: 46
End: 2021-05-27

## 2021-06-02 ENCOUNTER — IMMUNIZATION (OUTPATIENT)
Dept: PHARMACY | Facility: CLINIC | Age: 46
End: 2021-06-02
Payer: OTHER GOVERNMENT

## 2021-06-02 DIAGNOSIS — Z23 NEED FOR VACCINATION: Primary | ICD-10-CM

## 2021-06-14 ENCOUNTER — OFFICE VISIT (OUTPATIENT)
Dept: INTERNAL MEDICINE | Facility: CLINIC | Age: 46
End: 2021-06-14
Payer: OTHER GOVERNMENT

## 2021-06-14 VITALS
RESPIRATION RATE: 18 BRPM | BODY MASS INDEX: 33.98 KG/M2 | SYSTOLIC BLOOD PRESSURE: 132 MMHG | OXYGEN SATURATION: 99 % | HEIGHT: 67 IN | TEMPERATURE: 97 F | DIASTOLIC BLOOD PRESSURE: 88 MMHG | WEIGHT: 216.5 LBS | HEART RATE: 84 BPM

## 2021-06-14 DIAGNOSIS — B00.9 HSV-2 INFECTION: ICD-10-CM

## 2021-06-14 DIAGNOSIS — M70.21 OLECRANON BURSITIS OF BOTH ELBOWS: ICD-10-CM

## 2021-06-14 DIAGNOSIS — J06.9 UPPER RESPIRATORY TRACT INFECTION, UNSPECIFIED TYPE: ICD-10-CM

## 2021-06-14 DIAGNOSIS — R12 HEART BURN: ICD-10-CM

## 2021-06-14 DIAGNOSIS — N40.0 BENIGN PROSTATIC HYPERPLASIA WITHOUT LOWER URINARY TRACT SYMPTOMS: ICD-10-CM

## 2021-06-14 DIAGNOSIS — F43.10 PTSD (POST-TRAUMATIC STRESS DISORDER): ICD-10-CM

## 2021-06-14 DIAGNOSIS — G43.109 MIGRAINE WITH AURA AND WITHOUT STATUS MIGRAINOSUS, NOT INTRACTABLE: ICD-10-CM

## 2021-06-14 DIAGNOSIS — I10 ESSENTIAL HYPERTENSION: ICD-10-CM

## 2021-06-14 DIAGNOSIS — N40.0 BENIGN PROSTATIC HYPERPLASIA, UNSPECIFIED WHETHER LOWER URINARY TRACT SYMPTOMS PRESENT: Primary | ICD-10-CM

## 2021-06-14 DIAGNOSIS — N52.9 ERECTILE DYSFUNCTION, UNSPECIFIED ERECTILE DYSFUNCTION TYPE: ICD-10-CM

## 2021-06-14 DIAGNOSIS — M70.22 OLECRANON BURSITIS OF BOTH ELBOWS: ICD-10-CM

## 2021-06-14 DIAGNOSIS — S16.1XXA STRAIN OF NECK MUSCLE, INITIAL ENCOUNTER: ICD-10-CM

## 2021-06-14 PROCEDURE — 99999 PR PBB SHADOW E&M-EST. PATIENT-LVL IV: CPT | Mod: PBBFAC,,, | Performed by: INTERNAL MEDICINE

## 2021-06-14 PROCEDURE — 99214 OFFICE O/P EST MOD 30 MIN: CPT | Mod: PBBFAC | Performed by: INTERNAL MEDICINE

## 2021-06-14 PROCEDURE — 99214 PR OFFICE/OUTPT VISIT, EST, LEVL IV, 30-39 MIN: ICD-10-PCS | Mod: S$PBB,,, | Performed by: INTERNAL MEDICINE

## 2021-06-14 PROCEDURE — 99999 PR PBB SHADOW E&M-EST. PATIENT-LVL IV: ICD-10-PCS | Mod: PBBFAC,,, | Performed by: INTERNAL MEDICINE

## 2021-06-14 PROCEDURE — 99214 OFFICE O/P EST MOD 30 MIN: CPT | Mod: S$PBB,,, | Performed by: INTERNAL MEDICINE

## 2021-06-14 RX ORDER — VALACYCLOVIR HYDROCHLORIDE 500 MG/1
500 TABLET, FILM COATED ORAL 2 TIMES DAILY PRN
Qty: 30 TABLET | Refills: 0 | Status: SHIPPED | OUTPATIENT
Start: 2021-06-14

## 2021-06-14 RX ORDER — FAMOTIDINE 20 MG/1
20 TABLET, FILM COATED ORAL 2 TIMES DAILY PRN
Qty: 60 TABLET | Refills: 0 | Status: SHIPPED | OUTPATIENT
Start: 2021-06-14 | End: 2022-06-14

## 2021-06-14 RX ORDER — DICYCLOMINE HYDROCHLORIDE 20 MG/1
20 TABLET ORAL 3 TIMES DAILY PRN
Qty: 180 TABLET | Refills: 0 | Status: SHIPPED | OUTPATIENT
Start: 2021-06-14

## 2021-06-14 RX ORDER — CYCLOBENZAPRINE HCL 10 MG
TABLET ORAL
Qty: 15 TABLET | Refills: 0 | Status: SHIPPED | OUTPATIENT
Start: 2021-06-14

## 2021-06-14 RX ORDER — TADALAFIL 20 MG/1
TABLET ORAL
Qty: 90 TABLET | Refills: 0 | Status: SHIPPED | OUTPATIENT
Start: 2021-06-14

## 2021-06-14 RX ORDER — RIZATRIPTAN BENZOATE 10 MG/1
10 TABLET, ORALLY DISINTEGRATING ORAL DAILY PRN
Qty: 30 TABLET | Refills: 0 | Status: SHIPPED | OUTPATIENT
Start: 2021-06-14 | End: 2021-07-14

## 2021-06-14 RX ORDER — PRAZOSIN HYDROCHLORIDE 2 MG/1
2 CAPSULE ORAL NIGHTLY
Qty: 90 CAPSULE | Refills: 3 | Status: SHIPPED | OUTPATIENT
Start: 2021-06-14

## 2021-06-14 RX ORDER — LOSARTAN POTASSIUM 100 MG/1
100 TABLET ORAL DAILY
Qty: 90 TABLET | Refills: 1 | Status: SHIPPED | OUTPATIENT
Start: 2021-06-14 | End: 2022-06-14

## 2021-06-14 RX ORDER — FINASTERIDE 5 MG/1
5 TABLET, FILM COATED ORAL DAILY
Qty: 90 TABLET | Refills: 1 | Status: SHIPPED | OUTPATIENT
Start: 2021-06-14 | End: 2021-09-14

## 2021-06-15 RX ORDER — DICLOFENAC SODIUM 10 MG/G
2 GEL TOPICAL DAILY
Qty: 1 TUBE | Refills: 2 | Status: SHIPPED | OUTPATIENT
Start: 2021-06-15

## 2021-06-15 RX ORDER — CETIRIZINE HYDROCHLORIDE 10 MG/1
10 TABLET ORAL NIGHTLY
Qty: 30 TABLET | Refills: 0 | Status: SHIPPED | OUTPATIENT
Start: 2021-06-15 | End: 2022-06-15

## 2021-06-28 ENCOUNTER — TELEPHONE (OUTPATIENT)
Dept: INTERNAL MEDICINE | Facility: CLINIC | Age: 46
End: 2021-06-28

## 2021-06-29 ENCOUNTER — OFFICE VISIT (OUTPATIENT)
Dept: INTERNAL MEDICINE | Facility: CLINIC | Age: 46
End: 2021-06-29
Payer: OTHER GOVERNMENT

## 2021-06-29 ENCOUNTER — LAB VISIT (OUTPATIENT)
Dept: LAB | Facility: HOSPITAL | Age: 46
End: 2021-06-29
Payer: OTHER GOVERNMENT

## 2021-06-29 VITALS
HEIGHT: 67 IN | DIASTOLIC BLOOD PRESSURE: 86 MMHG | BODY MASS INDEX: 32.94 KG/M2 | WEIGHT: 209.88 LBS | OXYGEN SATURATION: 97 % | HEART RATE: 84 BPM | TEMPERATURE: 98 F | SYSTOLIC BLOOD PRESSURE: 128 MMHG

## 2021-06-29 DIAGNOSIS — R35.0 URINARY FREQUENCY: ICD-10-CM

## 2021-06-29 DIAGNOSIS — M54.50 ACUTE RIGHT-SIDED LOW BACK PAIN WITHOUT SCIATICA: ICD-10-CM

## 2021-06-29 DIAGNOSIS — R35.0 URINARY FREQUENCY: Primary | ICD-10-CM

## 2021-06-29 DIAGNOSIS — I10 ESSENTIAL HYPERTENSION: ICD-10-CM

## 2021-06-29 LAB
BILIRUB UR QL STRIP: NEGATIVE
CLARITY UR: CLEAR
COLOR UR: YELLOW
GLUCOSE UR QL STRIP: NEGATIVE
HGB UR QL STRIP: NEGATIVE
KETONES UR QL STRIP: NEGATIVE
LEUKOCYTE ESTERASE UR QL STRIP: NEGATIVE
NITRITE UR QL STRIP: NEGATIVE
PH UR STRIP: 7 [PH] (ref 5–8)
PROT UR QL STRIP: NEGATIVE
SP GR UR STRIP: <=1.005 (ref 1–1.03)
URN SPEC COLLECT METH UR: NORMAL

## 2021-06-29 PROCEDURE — 99999 PR PBB SHADOW E&M-EST. PATIENT-LVL IV: CPT | Mod: PBBFAC,,, | Performed by: PHYSICIAN ASSISTANT

## 2021-06-29 PROCEDURE — 81003 URINALYSIS AUTO W/O SCOPE: CPT | Performed by: PHYSICIAN ASSISTANT

## 2021-06-29 PROCEDURE — 99999 PR PBB SHADOW E&M-EST. PATIENT-LVL IV: ICD-10-PCS | Mod: PBBFAC,,, | Performed by: PHYSICIAN ASSISTANT

## 2021-06-29 PROCEDURE — 99214 OFFICE O/P EST MOD 30 MIN: CPT | Mod: PBBFAC | Performed by: PHYSICIAN ASSISTANT

## 2021-06-29 PROCEDURE — 87086 URINE CULTURE/COLONY COUNT: CPT | Performed by: PHYSICIAN ASSISTANT

## 2021-06-29 PROCEDURE — 99214 OFFICE O/P EST MOD 30 MIN: CPT | Mod: S$PBB,,, | Performed by: PHYSICIAN ASSISTANT

## 2021-06-29 PROCEDURE — 99214 PR OFFICE/OUTPT VISIT, EST, LEVL IV, 30-39 MIN: ICD-10-PCS | Mod: S$PBB,,, | Performed by: PHYSICIAN ASSISTANT

## 2021-06-30 LAB — BACTERIA UR CULT: NO GROWTH

## 2021-07-22 ENCOUNTER — OFFICE VISIT (OUTPATIENT)
Dept: PODIATRY | Facility: CLINIC | Age: 46
End: 2021-07-22
Payer: OTHER GOVERNMENT

## 2021-07-22 DIAGNOSIS — M79.671 INFLAMMATORY HEEL PAIN, RIGHT: ICD-10-CM

## 2021-07-22 DIAGNOSIS — M72.2 PLANTAR FASCIITIS: Primary | ICD-10-CM

## 2021-07-22 PROCEDURE — 99999 PR PBB SHADOW E&M-EST. PATIENT-LVL I: ICD-10-PCS | Mod: PBBFAC,,, | Performed by: PODIATRIST

## 2021-07-22 PROCEDURE — 99211 OFF/OP EST MAY X REQ PHY/QHP: CPT | Mod: PBBFAC | Performed by: PODIATRIST

## 2021-07-22 PROCEDURE — 20550 PR INJECT TENDON SHEATH/LIGAMENT: ICD-10-PCS | Mod: S$PBB,RT,, | Performed by: PODIATRIST

## 2021-07-22 PROCEDURE — 99999 PR PBB SHADOW E&M-EST. PATIENT-LVL I: CPT | Mod: PBBFAC,,, | Performed by: PODIATRIST

## 2021-07-22 PROCEDURE — 99499 UNLISTED E&M SERVICE: CPT | Mod: S$PBB,,, | Performed by: PODIATRIST

## 2021-07-22 PROCEDURE — 20550 NJX 1 TENDON SHEATH/LIGAMENT: CPT | Mod: S$PBB,RT,, | Performed by: PODIATRIST

## 2021-07-22 PROCEDURE — 99499 NO LOS: ICD-10-PCS | Mod: S$PBB,,, | Performed by: PODIATRIST

## 2021-07-22 PROCEDURE — 20550 NJX 1 TENDON SHEATH/LIGAMENT: CPT | Mod: RT,PBBFAC | Performed by: PODIATRIST

## 2021-07-22 RX ORDER — DEXAMETHASONE SODIUM PHOSPHATE 4 MG/ML
4 INJECTION, SOLUTION INTRA-ARTICULAR; INTRALESIONAL; INTRAMUSCULAR; INTRAVENOUS; SOFT TISSUE
Status: DISCONTINUED | OUTPATIENT
Start: 2021-07-22 | End: 2021-07-22 | Stop reason: HOSPADM

## 2021-07-22 RX ORDER — TRIAMCINOLONE ACETONIDE 40 MG/ML
40 INJECTION, SUSPENSION INTRA-ARTICULAR; INTRAMUSCULAR
Status: DISCONTINUED | OUTPATIENT
Start: 2021-07-22 | End: 2021-07-22 | Stop reason: HOSPADM

## 2021-07-22 RX ADMIN — TRIAMCINOLONE ACETONIDE 40 MG: 200 INJECTION, SUSPENSION INTRA-ARTICULAR; INTRAMUSCULAR at 11:07

## 2021-07-22 RX ADMIN — DEXAMETHASONE SODIUM PHOSPHATE 4 MG: 4 INJECTION INTRA-ARTICULAR; INTRALESIONAL; INTRAMUSCULAR; INTRAVENOUS; SOFT TISSUE at 11:07

## 2021-11-08 ENCOUNTER — TELEPHONE (OUTPATIENT)
Dept: RHEUMATOLOGY | Facility: CLINIC | Age: 46
End: 2021-11-08
Payer: OTHER GOVERNMENT

## 2022-04-27 ENCOUNTER — PATIENT MESSAGE (OUTPATIENT)
Dept: ADMINISTRATIVE | Facility: HOSPITAL | Age: 47
End: 2022-04-27
Payer: OTHER GOVERNMENT

## 2022-07-27 ENCOUNTER — PATIENT MESSAGE (OUTPATIENT)
Dept: ADMINISTRATIVE | Facility: HOSPITAL | Age: 47
End: 2022-07-27
Payer: OTHER GOVERNMENT

## 2022-10-03 ENCOUNTER — PATIENT OUTREACH (OUTPATIENT)
Dept: ADMINISTRATIVE | Facility: HOSPITAL | Age: 47
End: 2022-10-03
Payer: OTHER GOVERNMENT

## 2022-10-03 NOTE — PROGRESS NOTES
Called patient to schedule overdue PCP appt, Patient did not answer, unable to leave a voicemail

## 2022-10-20 ENCOUNTER — PATIENT MESSAGE (OUTPATIENT)
Dept: ADMINISTRATIVE | Facility: HOSPITAL | Age: 47
End: 2022-10-20
Payer: OTHER GOVERNMENT